# Patient Record
Sex: FEMALE | Race: WHITE | Employment: OTHER | URBAN - METROPOLITAN AREA
[De-identification: names, ages, dates, MRNs, and addresses within clinical notes are randomized per-mention and may not be internally consistent; named-entity substitution may affect disease eponyms.]

---

## 2017-04-21 ENCOUNTER — HOSPITAL ENCOUNTER (OUTPATIENT)
Dept: MAMMOGRAPHY | Age: 77
Discharge: HOME | End: 2017-04-21
Attending: FAMILY MEDICINE
Payer: MEDICARE

## 2017-04-21 DIAGNOSIS — Z78.0 POSTMENOPAUSAL: ICD-10-CM

## 2017-04-21 PROCEDURE — 77080 DXA BONE DENSITY AXIAL: CPT

## 2017-10-16 ENCOUNTER — HOSPITAL ENCOUNTER (OUTPATIENT)
Dept: LAB | Age: 77
Discharge: HOME | End: 2017-10-16

## 2017-10-16 PROCEDURE — 88305 TISSUE EXAM BY PATHOLOGIST: CPT | Performed by: INTERNAL MEDICINE

## 2017-10-25 ENCOUNTER — HOSPITAL ENCOUNTER (OUTPATIENT)
Dept: ULTRASOUND IMAGING | Age: 77
Discharge: HOME | End: 2017-10-25
Payer: MEDICARE

## 2017-10-25 DIAGNOSIS — M79.661 RIGHT CALF PAIN: ICD-10-CM

## 2017-10-25 PROBLEM — M25.561 ACUTE PAIN OF RIGHT KNEE: Status: ACTIVE | Noted: 2017-10-25

## 2017-10-25 PROCEDURE — 93971 EXTREMITY STUDY: CPT

## 2017-11-06 ENCOUNTER — HOSPITAL ENCOUNTER (OUTPATIENT)
Dept: LAB | Age: 77
Discharge: HOME | End: 2017-11-06
Payer: MEDICARE

## 2017-11-06 DIAGNOSIS — I25.119 CORONARY ARTERY DISEASE INVOLVING NATIVE CORONARY ARTERY OF NATIVE HEART WITH ANGINA PECTORIS (HCC): Chronic | ICD-10-CM

## 2017-11-06 LAB
ANION GAP SERPL CALC-SCNC: 10 MMOL/L
BASOPHILS # BLD: 0.1 K/UL (ref 0–0.2)
BASOPHILS NFR BLD: 1 % (ref 0–2)
BNP SERPL-MCNC: 105 PG/ML
BUN SERPL-MCNC: 19 MG/DL (ref 8–23)
CALCIUM SERPL-MCNC: 9 MG/DL (ref 8.3–10.4)
CHLORIDE SERPL-SCNC: 103 MMOL/L (ref 98–107)
CO2 SERPL-SCNC: 28 MMOL/L (ref 21–32)
CREAT SERPL-MCNC: 0.8 MG/DL (ref 0.6–1)
DIFFERENTIAL METHOD BLD: ABNORMAL
EOSINOPHIL # BLD: 0.3 K/UL (ref 0–0.8)
EOSINOPHIL NFR BLD: 6 % (ref 0.5–7.8)
ERYTHROCYTE [DISTWIDTH] IN BLOOD BY AUTOMATED COUNT: 12.8 % (ref 11.9–14.6)
GLUCOSE SERPL-MCNC: 99 MG/DL (ref 65–100)
HCT VFR BLD AUTO: 39.3 % (ref 35.8–46.3)
HGB BLD-MCNC: 12.6 G/DL (ref 11.7–15.4)
INR PPP: 0.9 (ref 0.9–1.2)
LYMPHOCYTES # BLD: 2.3 K/UL (ref 0.5–4.6)
LYMPHOCYTES NFR BLD: 38 % (ref 13–44)
MCH RBC QN AUTO: 31.9 PG (ref 26.1–32.9)
MCHC RBC AUTO-ENTMCNC: 32.1 G/DL (ref 31.4–35)
MCV RBC AUTO: 99.5 FL (ref 79.6–97.8)
MONOCYTES # BLD: 0.4 K/UL (ref 0.1–1.3)
MONOCYTES NFR BLD: 7 % (ref 4–12)
NEUTS SEG # BLD: 2.9 K/UL (ref 1.7–8.2)
NEUTS SEG NFR BLD: 48 % (ref 43–78)
PLATELET # BLD AUTO: 191 K/UL (ref 150–450)
PMV BLD AUTO: 12 FL (ref 10.8–14.1)
POTASSIUM SERPL-SCNC: 4.2 MMOL/L (ref 3.5–5.1)
PROTHROMBIN TIME: 9.5 SEC (ref 9.6–12)
RBC # BLD AUTO: 3.95 M/UL (ref 4.05–5.25)
SODIUM SERPL-SCNC: 141 MMOL/L (ref 136–145)
TSH SERPL DL<=0.005 MIU/L-ACNC: 1.64 UIU/ML (ref 0.36–3.74)
WBC # BLD AUTO: 5.9 K/UL (ref 4.3–11.1)

## 2017-11-06 PROCEDURE — 80048 BASIC METABOLIC PNL TOTAL CA: CPT | Performed by: INTERNAL MEDICINE

## 2017-11-06 PROCEDURE — 84443 ASSAY THYROID STIM HORMONE: CPT | Performed by: INTERNAL MEDICINE

## 2017-11-06 PROCEDURE — 85025 COMPLETE CBC W/AUTO DIFF WBC: CPT | Performed by: INTERNAL MEDICINE

## 2017-11-06 PROCEDURE — 85610 PROTHROMBIN TIME: CPT | Performed by: INTERNAL MEDICINE

## 2017-11-06 PROCEDURE — 83880 ASSAY OF NATRIURETIC PEPTIDE: CPT | Performed by: INTERNAL MEDICINE

## 2017-11-06 PROCEDURE — 36415 COLL VENOUS BLD VENIPUNCTURE: CPT | Performed by: INTERNAL MEDICINE

## 2017-11-09 NOTE — PROGRESS NOTES
Patient pre-assessment complete for [facility] poss with Dr Ezekiel Lugo scheduled for 11/10/17 at 8am, arrival time 6am. Patient verified using . Patient instructed to bring all home medications in labeled bottles on the day of procedure. NPO status reinforced. Patient informed to take a full dose aspirin 325mg  or 81 mg x 4 on the day of procedure. Patient instructed to HOLD metformin (last dose 17). Instructed they can take all other medications excluding vitamins & supplements. Patient verbalizes understanding of all instructions & denies any questions at this time.

## 2017-11-10 ENCOUNTER — HOSPITAL ENCOUNTER (OUTPATIENT)
Dept: CARDIAC CATH/INVASIVE PROCEDURES | Age: 77
Discharge: HOME | End: 2017-11-10
Attending: INTERNAL MEDICINE | Admitting: INTERNAL MEDICINE
Payer: MEDICARE

## 2017-11-10 VITALS
WEIGHT: 164 LBS | TEMPERATURE: 98 F | RESPIRATION RATE: 14 BRPM | SYSTOLIC BLOOD PRESSURE: 165 MMHG | DIASTOLIC BLOOD PRESSURE: 72 MMHG | OXYGEN SATURATION: 96 % | BODY MASS INDEX: 24.86 KG/M2 | HEART RATE: 62 BPM | HEIGHT: 68 IN

## 2017-11-10 LAB
ANION GAP SERPL CALC-SCNC: 7 MMOL/L (ref 7–16)
ATRIAL RATE: 57 BPM
BUN SERPL-MCNC: 15 MG/DL (ref 8–23)
CALCIUM SERPL-MCNC: 8.9 MG/DL (ref 8.3–10.4)
CALCULATED P AXIS, ECG09: 46 DEGREES
CALCULATED R AXIS, ECG10: 64 DEGREES
CALCULATED T AXIS, ECG11: 67 DEGREES
CHLORIDE SERPL-SCNC: 103 MMOL/L (ref 98–107)
CO2 SERPL-SCNC: 29 MMOL/L (ref 21–32)
CREAT SERPL-MCNC: 0.75 MG/DL (ref 0.6–1)
DIAGNOSIS, 93000: NORMAL
ERYTHROCYTE [DISTWIDTH] IN BLOOD BY AUTOMATED COUNT: 13 % (ref 11.9–14.6)
GLUCOSE SERPL-MCNC: 103 MG/DL (ref 65–100)
HCT VFR BLD AUTO: 36.6 % (ref 35.8–46.3)
HGB BLD-MCNC: 11.7 G/DL (ref 11.7–15.4)
INR PPP: 0.9 (ref 0.9–1.2)
MAGNESIUM SERPL-MCNC: 2 MG/DL (ref 1.8–2.4)
MCH RBC QN AUTO: 30.4 PG (ref 26.1–32.9)
MCHC RBC AUTO-ENTMCNC: 32 G/DL (ref 31.4–35)
MCV RBC AUTO: 95.1 FL (ref 79.6–97.8)
P-R INTERVAL, ECG05: 172 MS
PLATELET # BLD AUTO: 184 K/UL (ref 150–450)
PMV BLD AUTO: 11.8 FL (ref 10.8–14.1)
POTASSIUM SERPL-SCNC: 4.5 MMOL/L (ref 3.5–5.1)
PROTHROMBIN TIME: 10 SEC (ref 9.6–12)
Q-T INTERVAL, ECG07: 462 MS
QRS DURATION, ECG06: 90 MS
QTC CALCULATION (BEZET), ECG08: 449 MS
RBC # BLD AUTO: 3.85 M/UL (ref 4.05–5.25)
SODIUM SERPL-SCNC: 139 MMOL/L (ref 136–145)
VENTRICULAR RATE, ECG03: 57 BPM
WBC # BLD AUTO: 5.1 K/UL (ref 4.3–11.1)

## 2017-11-10 PROCEDURE — 93458 L HRT ARTERY/VENTRICLE ANGIO: CPT

## 2017-11-10 PROCEDURE — 85610 PROTHROMBIN TIME: CPT | Performed by: INTERNAL MEDICINE

## 2017-11-10 PROCEDURE — 77030004534 HC CATH ANGI DX INFN CARD -A

## 2017-11-10 PROCEDURE — 77030029997 HC DEV COM RDL R BND TELE -B

## 2017-11-10 PROCEDURE — 74011636320 HC RX REV CODE- 636/320: Performed by: INTERNAL MEDICINE

## 2017-11-10 PROCEDURE — 74011250636 HC RX REV CODE- 250/636

## 2017-11-10 PROCEDURE — C1894 INTRO/SHEATH, NON-LASER: HCPCS

## 2017-11-10 PROCEDURE — C1769 GUIDE WIRE: HCPCS

## 2017-11-10 PROCEDURE — 85027 COMPLETE CBC AUTOMATED: CPT | Performed by: INTERNAL MEDICINE

## 2017-11-10 PROCEDURE — 74011250637 HC RX REV CODE- 250/637: Performed by: INTERNAL MEDICINE

## 2017-11-10 PROCEDURE — 83735 ASSAY OF MAGNESIUM: CPT | Performed by: INTERNAL MEDICINE

## 2017-11-10 PROCEDURE — 77030015766

## 2017-11-10 PROCEDURE — 74011250636 HC RX REV CODE- 250/636: Performed by: INTERNAL MEDICINE

## 2017-11-10 PROCEDURE — 93005 ELECTROCARDIOGRAM TRACING: CPT | Performed by: INTERNAL MEDICINE

## 2017-11-10 PROCEDURE — 99152 MOD SED SAME PHYS/QHP 5/>YRS: CPT

## 2017-11-10 PROCEDURE — 74011000250 HC RX REV CODE- 250: Performed by: INTERNAL MEDICINE

## 2017-11-10 PROCEDURE — 80048 BASIC METABOLIC PNL TOTAL CA: CPT | Performed by: INTERNAL MEDICINE

## 2017-11-10 RX ORDER — SODIUM CHLORIDE 9 MG/ML
75 INJECTION, SOLUTION INTRAVENOUS CONTINUOUS
Status: DISCONTINUED | OUTPATIENT
Start: 2017-11-10 | End: 2017-11-10 | Stop reason: HOSPADM

## 2017-11-10 RX ORDER — LIDOCAINE HYDROCHLORIDE 20 MG/ML
1-20 INJECTION, SOLUTION INFILTRATION; PERINEURAL ONCE
Status: COMPLETED | OUTPATIENT
Start: 2017-11-10 | End: 2017-11-10

## 2017-11-10 RX ORDER — FENTANYL CITRATE 50 UG/ML
25-100 INJECTION, SOLUTION INTRAMUSCULAR; INTRAVENOUS
Status: DISCONTINUED | OUTPATIENT
Start: 2017-11-10 | End: 2017-11-10

## 2017-11-10 RX ORDER — GUAIFENESIN 100 MG/5ML
81-324 LIQUID (ML) ORAL
Status: DISCONTINUED | OUTPATIENT
Start: 2017-11-10 | End: 2017-11-10 | Stop reason: HOSPADM

## 2017-11-10 RX ORDER — DIAZEPAM 5 MG/1
5 TABLET ORAL ONCE
Status: COMPLETED | OUTPATIENT
Start: 2017-11-10 | End: 2017-11-10

## 2017-11-10 RX ORDER — MIDAZOLAM HYDROCHLORIDE 1 MG/ML
1-6 INJECTION, SOLUTION INTRAMUSCULAR; INTRAVENOUS
Status: DISCONTINUED | OUTPATIENT
Start: 2017-11-10 | End: 2017-11-10

## 2017-11-10 RX ORDER — HEPARIN SODIUM 200 [USP'U]/100ML
15 INJECTION, SOLUTION INTRAVENOUS CONTINUOUS
Status: DISCONTINUED | OUTPATIENT
Start: 2017-11-10 | End: 2017-11-10

## 2017-11-10 RX ORDER — SODIUM CHLORIDE 0.9 % (FLUSH) 0.9 %
5-10 SYRINGE (ML) INJECTION EVERY 8 HOURS
Status: DISCONTINUED | OUTPATIENT
Start: 2017-11-10 | End: 2017-11-10 | Stop reason: HOSPADM

## 2017-11-10 RX ORDER — SODIUM CHLORIDE 0.9 % (FLUSH) 0.9 %
5-10 SYRINGE (ML) INJECTION AS NEEDED
Status: DISCONTINUED | OUTPATIENT
Start: 2017-11-10 | End: 2017-11-10 | Stop reason: HOSPADM

## 2017-11-10 RX ADMIN — HEPARIN SODIUM 15 UNITS/HR: 200 INJECTION, SOLUTION INTRAVENOUS at 08:15

## 2017-11-10 RX ADMIN — HEPARIN SODIUM 2 ML: 10000 INJECTION, SOLUTION INTRAVENOUS; SUBCUTANEOUS at 08:37

## 2017-11-10 RX ADMIN — FENTANYL CITRATE 25 MCG: 50 INJECTION, SOLUTION INTRAMUSCULAR; INTRAVENOUS at 08:28

## 2017-11-10 RX ADMIN — SODIUM CHLORIDE 75 ML/HR: 900 INJECTION, SOLUTION INTRAVENOUS at 07:11

## 2017-11-10 RX ADMIN — LIDOCAINE HYDROCHLORIDE 60 MG: 20 INJECTION, SOLUTION INFILTRATION; PERINEURAL at 08:36

## 2017-11-10 RX ADMIN — IOPAMIDOL 70 ML: 755 INJECTION, SOLUTION INTRAVENOUS at 08:47

## 2017-11-10 RX ADMIN — MIDAZOLAM HYDROCHLORIDE 1 MG: 1 INJECTION, SOLUTION INTRAMUSCULAR; INTRAVENOUS at 08:28

## 2017-11-10 RX ADMIN — FENTANYL CITRATE 25 MCG: 50 INJECTION, SOLUTION INTRAMUSCULAR; INTRAVENOUS at 08:37

## 2017-11-10 RX ADMIN — DIAZEPAM 5 MG: 5 TABLET ORAL at 07:14

## 2017-11-10 RX ADMIN — MIDAZOLAM HYDROCHLORIDE 1 MG: 1 INJECTION, SOLUTION INTRAMUSCULAR; INTRAVENOUS at 08:36

## 2017-11-10 NOTE — IP AVS SNAPSHOT
Darleen Akhtar 
 
 
 [address] [address] [facility] 
[phone] Patient: Aristeo Willis MRN: [id number] [facility]:[birth year]009 Discharge Summary 11/10/2017 Aristeo Willis MRN[de-identified]  [id number] Admission Information Provider Pager Service Admission Date Expected D/C Date Anali Webb MD  CARDIAC CATH LAB 11/10/2017 Actual LOS Patient Class 0 days OUTPATIENT Follow-up Information Follow up With Details Comments Contact Info Dayton Clemente DO On 12/7/2017 A follow-up appointment has been scheduled for you for Thursday, December 7 at 9:15am with Dr. Misty Roberto in the [place] office. [address] [facility] Card[facility] [address] 
[phone] My Medications ASK your physician about these medications Instructions Each Dose to Equal  
 Morning Noon Evening Bedtime  
 albuterol 90 mcg/actuation inhaler Commonly known as:  PROAIR HFA Your last dose was: Your next dose is: Take 2 Puffs by inhalation every four (4) hours as needed for Wheezing. 2 Puff  
    
   
   
   
  
 aspirin delayed-release 81 mg tablet Your last dose was: Your next dose is: Take  by mouth daily. FLUoxetine 40 mg capsule Commonly known as:  PROzac Your last dose was: Your next dose is: Take 1 Cap by mouth two (2) times a day. 40 mg  
    
   
   
   
  
 fluticasone 50 mcg/actuation nasal spray Commonly known as:  Charles Merles Your last dose was: Your next dose is: 2 Sprays by Both Nostrils route daily. 2 [place] LUBRICANT EYE DROPS (GLYC-PG) OP Your last dose was: Your next dose is:    
   
   
 as needed. metFORMIN 1,000 mg tablet Commonly known as:  GLUCOPHAGE Your last dose was: Your next dose is: TAKE 1 TABLET TWICE DAILY WITH MEALS  
     
   
   
   
  
 nitroglycerin 0.4 mg SL tablet Commonly known as:  NITROSTAT Your last dose was: Your next dose is:    
   
   
 TAke one pill every 5 minutes at onset of chest pain, repeat 3 times, call 911 if it doesn't resolve  
     
   
   
   
  
 potassium 99 mg tablet Your last dose was: Your next dose is: Take 99 mg by mouth daily. 99 mg  
    
   
   
   
  
  
  
  
 
  
General Information Please provide this summary of care documentation to your next provider. Allergies Unspecified:  Codeine;  Crestor [Rosuvastatin]; Esomeprazole;  Lipitor [Atorvastatin]; Lisinopril;  Pravachol [Pravastatin];  Sulfa (Sulfonamide Antibiotics); Sulfur Current Immunizations  Reviewed on 4/14/2017 Name Date Influenza High Dose Vaccine PF 9/2/2017, 10/27/2016, 10/26/2015, 10/9/2014, 9/30/2013, 10/16/2012 Influenza Vaccine 9/13/2011, 10/29/2010 Pneumococcal Conjugate (PCV-13) 4/14/2017 Pneumococcal Vaccine (Unspecified Type) 3/26/2014, 1/1/2013, 1/1/2012 Zoster Vaccine, Live 3/31/2017 Discharge Instructions Discharge Instructions HEART CATHETERIZATION/ANGIOGRAPHY DISCHARGE INSTRUCTIONS 1. Check puncture site frequently for swelling or bleeding. If there is any bleeding, lie down and apply pressure over the area with a clean towel or washcloth. Call 911. Notify your doctor for any redness, swelling, drainage, or oozing from the puncture site. Notify your doctor for any fever or chills. 2. If the extremity becomes cold, numb, or painful call [facility] at [phone]. 
3. Activity should be limited for the next 48 hours. Climb stairs as little as possible and avoid any stooping, bending, or strenuous activity for 48 hours. No heavy lifting (anything over 10 pounds) for 3 days. 4. You may resume your usual diet. Drink more fluids than usual. 
5. Have a responsible person drive you home and stay with you for at least 24 hours after your heart catheterization/angiography. Do not drive for the next 24 hours. 6. You may remove bandage from your Right wrist in 24 hours. You may shower in 24 hours. No tub baths, hot tubs, or swimming for 1 week. Do not place any lotions, creams, powders, or ointments over puncture site for 1 week. You may place a clean band-aid over the puncture site each day for 5 days. Change daily. 7. Please continue your medications as prescribed by your physician. I have read the above instructions and have had the opportunity to ask questions. Patient: ________________________   Date: 11/10/2017 Witness: _______________________   Date: 11/10/2017 Discharge Orders None  
  
` Patient Signature:  ____________________________________________________________ Date:  ____________________________________________________________  
  
 Nydia Butcher Provider Signature:  ____________________________________________________________ Date:  ____________________________________________________________

## 2017-11-10 NOTE — PROCEDURES
[address]       Name:  Michele Champagne   MR#:  [id number]   :  1940   Account #:  [de-identified]   Date of Adm:  11/10/2017       DATE OF PROCEDURE: 11/10/2017     CLINICAL INFORMATION: Patient with worsening exertional fatigue   and shortness of breath concerning for [place] class 3 angina,   referred for catheterization. PROCEDURE DETAILS: After informed consent was obtained, a 6-  [place] sheath was placed in the right radial artery. A radial   cocktail was given by protocol. A 5-[place] Tiger catheter and   angled pigtail were used for diagnostic angiography. TR band was   placed at the end of the procedure. Conscious sedation was given by Ashkan Us RN beginning at 8:26   and concluding at 8:50 for a total of 2 mg of Versed and 50 mcg   fentanyl. Vital signs and saturation were stable throughout. FINDINGS: The left main coronary artery is in the normal   anatomic position, free of significant disease. It bifurcates   into LAD and circumflex system. The LAD has a smooth a 30% to 40% mid vessel narrowing. The   distal vessel is small, but free of any high-grade stenosis. There is a stent in the proximal diagonal branch, this is widely   patent. The circumflex has a proximal moderate-sized obtuse marginal   branch. There is no atherosclerosis seen in the circumflex   system. The right coronary artery is a large dominant vessel in normal   anatomic position with a smooth 30% mid vessel narrowing. The   PDA, posterolateral obtuse marginal branches are free of   significant disease. There is 20% proximal RCA narrowing. Left ventriculogram reveals normal systolic function, ejection   fraction 60%. Left ventricular end-diastolic pressure is 16   mmHg. Opening aortic pressure is 110/55 with no gradient across   the aortic valve. CONCLUSION:   1. Mild diffuse coronary atherosclerotic heart disease with no   high-grade lesions seen. 2. Normal left ventricular systolic function.         MD NEELA Aden / Ashley Moon   D:  11/10/2017   09:11   T:  11/10/2017   12:54   Job #:  [id number]

## 2017-11-10 NOTE — ROUTINE PROCESS
TRANSFER - OUT REPORT:    Verbal report given to Stephanie Arevalo RN (name) on Naveen Keita  being transferred to [address] [place] [address] (unit) for routine progression of care       Report consisted of patients Situation, Background, Assessment and   Recommendations(SBAR). Information from the following report(s) Procedure Summary, MAR and Recent Results was reviewed with the receiving nurse. Lines:   Peripheral IV 11/10/17 Right Antecubital (Active)        Opportunity for questions and clarification was provided.       Patient transported with:   [facility] w/ Dr Toney Skinner cath  R band to right radial w/ 8 mls at 0850  Versed 2 mg IV  Fentanyl 50 mcg IV

## 2017-11-10 NOTE — PROGRESS NOTES
IP consult to Cardiac Rehab. Patient had LHC with no PCI. Cath was clean. We will contact patient if qualifying referral is received.

## 2017-11-10 NOTE — PROCEDURES
Brief Cardiac Procedure Note    Patient: Shashi Berry MRN: [id number]  SSN: [id number]    YOB: 1940  Age: 78 y.o. Sex: female      Date of Procedure: 11/10/2017     Pre-procedure Diagnosis: Chest pain CCS Class III    Post-procedure Diagnosis: Non-cardiac Chest Pain    Procedure: Left Heart Catheterization    Brief Description of Procedure: lhc via right radial, tr    Performed By: John Simpson MD     Assistants: none    Anesthesia: Moderate Sedation    Estimated Blood Loss: Less than 10 mL      Specimens: None    Implants: None    Findings: mild cad- nl lvef    Complications: None    Recommendations: Continue medical therapy.     Signed By: John Simpson MD     November 10, 2017

## 2017-11-10 NOTE — DISCHARGE INSTRUCTIONS
HEART CATHETERIZATION/ANGIOGRAPHY DISCHARGE INSTRUCTIONS    1. Check puncture site frequently for swelling or bleeding. If there is any bleeding, lie down and apply pressure over the area with a clean towel or washcloth. Call 911. Notify your doctor for any redness, swelling, drainage, or oozing from the puncture site. Notify your doctor for any fever or chills. 2. If the extremity becomes cold, numb, or painful call [facility] at [phone].  3. Activity should be limited for the next 48 hours. Climb stairs as little as possible and avoid any stooping, bending, or strenuous activity for 48 hours. No heavy lifting (anything over 10 pounds) for 3 days. 4. You may resume your usual diet. Drink more fluids than usual.  5. Have a responsible person drive you home and stay with you for at least 24 hours after your heart catheterization/angiography. Do not drive for the next 24 hours. 6. You may remove bandage from your Right wrist in 24 hours. You may shower in 24 hours. No tub baths, hot tubs, or swimming for 1 week. Do not place any lotions, creams, powders, or ointments over puncture site for 1 week. You may place a clean band-aid over the puncture site each day for 5 days. Change daily. 7. Please continue your medications as prescribed by your physician. I have read the above instructions and have had the opportunity to ask questions.       Patient: ________________________   Date: 11/10/2017    Witness: _______________________   Date: 11/10/2017

## 2017-11-10 NOTE — PROGRESS NOTES
Patient received to [address] [place] [address] room # 7  Ambulatory from [facility]. Patient scheduled for LHC today with Dr Che Rivera. Procedure reviewed & questions answered, voiced good understanding consent obtained & placed on chart. All medications and medical history reviewed. Will prep patient per orders. Patient & family updated on plan of care.

## 2017-11-10 NOTE — PROGRESS NOTES
TRANSFER - IN REPORT:    Verbal report received from Beny Bush RN(name) on [facility]  being received from cath lab(unit) for routine progression of care      Report consisted of patients Situation, Background, Assessment and   Recommendations(SBAR). Information from the following report(s) Procedure Summary was reviewed with the receiving nurse. Opportunity for questions and clarification was provided. Assessment completed upon patients arrival to unit and care assumed.

## 2017-11-29 ENCOUNTER — HOSPITAL ENCOUNTER (OUTPATIENT)
Dept: NUCLEAR MEDICINE | Age: 77
Discharge: HOME | End: 2017-11-29
Attending: FAMILY MEDICINE
Payer: MEDICARE

## 2017-11-29 DIAGNOSIS — G89.29 CHRONIC THORACIC SPINE PAIN: ICD-10-CM

## 2017-11-29 DIAGNOSIS — M54.6 CHRONIC THORACIC SPINE PAIN: ICD-10-CM

## 2017-11-29 DIAGNOSIS — Z85.3 HISTORY OF BREAST CANCER: ICD-10-CM

## 2017-11-29 PROCEDURE — 78306 BONE IMAGING WHOLE BODY: CPT

## 2018-04-10 PROBLEM — M79.18 MUSCLE PAIN, MYOFACIAL: Status: ACTIVE | Noted: 2018-04-10

## 2018-06-28 ENCOUNTER — PATIENT OUTREACH (OUTPATIENT)
Dept: CASE MANAGEMENT | Age: 78
End: 2018-06-28

## 2018-06-28 NOTE — PROGRESS NOTES
Medication Adherence Outreach    Date/Time of Call:  6/28/2018  3:30 pm    Name of medication and dosage:   * Metformin 1000 mg 1 bid    Does the patient know the purpose and dosage of medication? * Yes    Are you getting a #30 day or #90 day supply of your medication? * 90 day supply    Are you still taking this medication? If not, why? * Yes    Transportation issues or any problems paying for the medication or other reason? * No    What pharmacy are you using to fill your medication and do you know the last time that you got your medication filled? * [facility]   * Last refill 4/19/2018   Are you having any side effects from taking your medication? * No          Any other questions or concerns expressed by the patient. * No    Comments:     * Discussed medication adherence with Ms. Nila Conroy and she states she has no current barriers to prevent her from obtaining or taking her medication.        Call Completed By:     [id number] WHIT Chavez.

## 2018-10-17 PROBLEM — E11.21 TYPE 2 DIABETES WITH NEPHROPATHY (HCC): Status: ACTIVE | Noted: 2018-10-17

## 2018-12-26 ENCOUNTER — HOSPITAL ENCOUNTER (OUTPATIENT)
Dept: MRI IMAGING | Age: 78
Discharge: HOME | End: 2018-12-26
Attending: FAMILY MEDICINE
Payer: MEDICARE

## 2018-12-26 DIAGNOSIS — R29.898 WEAKNESS OF BOTH LOWER EXTREMITIES: ICD-10-CM

## 2018-12-26 DIAGNOSIS — G89.29 CHRONIC RIGHT-SIDED THORACIC BACK PAIN: ICD-10-CM

## 2018-12-26 DIAGNOSIS — M54.6 CHRONIC RIGHT-SIDED THORACIC BACK PAIN: ICD-10-CM

## 2018-12-26 PROCEDURE — 72146 MRI CHEST SPINE W/O DYE: CPT

## 2018-12-28 NOTE — PROGRESS NOTES
Please let Mrs. Bella know that her thoracic spine MRI shows disc protrusion without cord compression. This is the reason for her pain, but it does not surgical intervention. She may be interested in going to pain management to see what they can offer her for this pain.

## 2019-01-19 ENCOUNTER — APPOINTMENT (OUTPATIENT)
Dept: CT IMAGING | Age: 79
End: 2019-01-19
Attending: EMERGENCY MEDICINE
Payer: MEDICARE

## 2019-01-19 ENCOUNTER — HOSPITAL ENCOUNTER (EMERGENCY)
Age: 79
Discharge: HOME | End: 2019-01-19
Attending: EMERGENCY MEDICINE
Payer: MEDICARE

## 2019-01-19 VITALS
WEIGHT: 172 LBS | RESPIRATION RATE: 18 BRPM | HEIGHT: 68 IN | TEMPERATURE: 97.7 F | DIASTOLIC BLOOD PRESSURE: 69 MMHG | OXYGEN SATURATION: 96 % | BODY MASS INDEX: 26.07 KG/M2 | HEART RATE: 75 BPM | SYSTOLIC BLOOD PRESSURE: 133 MMHG

## 2019-01-19 DIAGNOSIS — R10.9 ACUTE RIGHT FLANK PAIN: Primary | ICD-10-CM

## 2019-01-19 LAB
ALBUMIN SERPL-MCNC: 4 G/DL (ref 3.2–4.6)
ALBUMIN/GLOB SERPL: 1.1 {RATIO} (ref 1.2–3.5)
ALP SERPL-CCNC: 111 U/L (ref 50–136)
ALT SERPL-CCNC: 25 U/L (ref 12–65)
ANION GAP SERPL CALC-SCNC: 9 MMOL/L (ref 7–16)
AST SERPL-CCNC: 23 U/L (ref 15–37)
BACTERIA URNS QL MICRO: ABNORMAL /HPF
BILIRUB SERPL-MCNC: 0.3 MG/DL (ref 0.2–1.1)
BUN SERPL-MCNC: 15 MG/DL (ref 8–23)
CALCIUM SERPL-MCNC: 8.8 MG/DL (ref 8.3–10.4)
CASTS URNS QL MICRO: 0 /LPF
CHLORIDE SERPL-SCNC: 105 MMOL/L (ref 98–107)
CO2 SERPL-SCNC: 24 MMOL/L (ref 21–32)
CREAT SERPL-MCNC: 1.19 MG/DL (ref 0.6–1)
EPI CELLS #/AREA URNS HPF: 0 /HPF
ERYTHROCYTE [DISTWIDTH] IN BLOOD BY AUTOMATED COUNT: 12.6 % (ref 11.9–14.6)
GLOBULIN SER CALC-MCNC: 3.8 G/DL (ref 2.3–3.5)
GLUCOSE SERPL-MCNC: 125 MG/DL (ref 65–100)
HCT VFR BLD AUTO: 36.5 % (ref 35.8–46.3)
HGB BLD-MCNC: 11.7 G/DL (ref 11.7–15.4)
MCH RBC QN AUTO: 31.7 PG (ref 26.1–32.9)
MCHC RBC AUTO-ENTMCNC: 32.1 G/DL (ref 31.4–35)
MCV RBC AUTO: 98.9 FL (ref 79.6–97.8)
NRBC # BLD: 0 K/UL (ref 0–0.2)
PLATELET # BLD AUTO: 198 K/UL (ref 150–450)
PMV BLD AUTO: 11.3 FL (ref 9.4–12.3)
POTASSIUM SERPL-SCNC: 4 MMOL/L (ref 3.5–5.1)
PROT SERPL-MCNC: 7.8 G/DL (ref 6.3–8.2)
RBC # BLD AUTO: 3.69 M/UL (ref 4.05–5.2)
RBC #/AREA URNS HPF: ABNORMAL /HPF
SODIUM SERPL-SCNC: 138 MMOL/L (ref 136–145)
WBC # BLD AUTO: 6.1 K/UL (ref 4.3–11.1)
WBC URNS QL MICRO: ABNORMAL /HPF

## 2019-01-19 PROCEDURE — 96375 TX/PRO/DX INJ NEW DRUG ADDON: CPT | Performed by: EMERGENCY MEDICINE

## 2019-01-19 PROCEDURE — 80053 COMPREHEN METABOLIC PANEL: CPT

## 2019-01-19 PROCEDURE — 74176 CT ABD & PELVIS W/O CONTRAST: CPT

## 2019-01-19 PROCEDURE — 87186 SC STD MICRODIL/AGAR DIL: CPT

## 2019-01-19 PROCEDURE — 99284 EMERGENCY DEPT VISIT MOD MDM: CPT | Performed by: EMERGENCY MEDICINE

## 2019-01-19 PROCEDURE — 74011250636 HC RX REV CODE- 250/636: Performed by: EMERGENCY MEDICINE

## 2019-01-19 PROCEDURE — 87088 URINE BACTERIA CULTURE: CPT

## 2019-01-19 PROCEDURE — 85027 COMPLETE CBC AUTOMATED: CPT

## 2019-01-19 PROCEDURE — 87086 URINE CULTURE/COLONY COUNT: CPT

## 2019-01-19 PROCEDURE — 96374 THER/PROPH/DIAG INJ IV PUSH: CPT | Performed by: EMERGENCY MEDICINE

## 2019-01-19 PROCEDURE — 81003 URINALYSIS AUTO W/O SCOPE: CPT | Performed by: EMERGENCY MEDICINE

## 2019-01-19 PROCEDURE — 81015 MICROSCOPIC EXAM OF URINE: CPT

## 2019-01-19 PROCEDURE — 74011250637 HC RX REV CODE- 250/637: Performed by: EMERGENCY MEDICINE

## 2019-01-19 RX ORDER — MORPHINE SULFATE 2 MG/ML
4 INJECTION, SOLUTION INTRAMUSCULAR; INTRAVENOUS
Status: COMPLETED | OUTPATIENT
Start: 2019-01-19 | End: 2019-01-19

## 2019-01-19 RX ORDER — CEPHALEXIN 500 MG/1
500 CAPSULE ORAL
Status: COMPLETED | OUTPATIENT
Start: 2019-01-19 | End: 2019-01-19

## 2019-01-19 RX ORDER — CEPHALEXIN 500 MG/1
500 CAPSULE ORAL 4 TIMES DAILY
Qty: 28 CAP | Refills: 0 | Status: SHIPPED | OUTPATIENT
Start: 2019-01-19 | End: 2019-01-26

## 2019-01-19 RX ORDER — TRAMADOL HYDROCHLORIDE 50 MG/1
50 TABLET ORAL
Qty: 12 TAB | Refills: 0 | Status: SHIPPED | OUTPATIENT
Start: 2019-01-19 | End: 2019-04-01

## 2019-01-19 RX ORDER — ONDANSETRON 2 MG/ML
4 INJECTION INTRAMUSCULAR; INTRAVENOUS
Status: COMPLETED | OUTPATIENT
Start: 2019-01-19 | End: 2019-01-19

## 2019-01-19 RX ADMIN — ONDANSETRON 4 MG: 2 INJECTION INTRAMUSCULAR; INTRAVENOUS at 12:55

## 2019-01-19 RX ADMIN — CEPHALEXIN 500 MG: 500 CAPSULE ORAL at 14:57

## 2019-01-19 RX ADMIN — MORPHINE SULFATE 4 MG: 2 INJECTION, SOLUTION INTRAMUSCULAR; INTRAVENOUS at 12:55

## 2019-01-19 NOTE — ED NOTES
I have reviewed discharge instructions with the patient. The patient verbalized understanding. Patient left ED via Discharge Method: ambulatory to Home with (Daughter). Opportunity for questions and clarification provided. Patient given 2 scripts. To continue your aftercare when you leave the hospital, you may receive an automated call from our care team to check in on how you are doing. This is a free service and part of our promise to provide the best care and service to meet your aftercare needs.  If you have questions, or wish to unsubscribe from this service please call [phone]. Thank you for Choosing our [facility] Emergency Department.

## 2019-01-19 NOTE — ED PROVIDER NOTES
HPI: 
70 female here with right flank pain leading to right lower abdomen for the past several days. Unable to urinate at this time. Has had prior cholecystectomy, appendectomy, pronator disc surgery. No weakness tingling or numbness. No saddle paresthesia. Pain is constant. No prior kidney stones ROS Constitutional: No fever, no chills Skin: no rash Eye:  
ENMT:  
Respiratory: No shortness of breath, no cough Cardiovascular: No chest pain, no palpitations Gastrointestinal: No vomiting, no nausea, no diarrhea, + abdominal pain : No dysuria MSK: No back pain, no muscle pain, no joint pain Neuro: No headache, no change in mental status, no numbness, no tingling, no weakness Psych:  
Endocrine:  
All other review of systems positive per history of present illness and the above otherwise negative or noncontributory. Visit Vitals /62 (BP 1 Location: Right arm, BP Patient Position: At rest;Sitting) Pulse 84 Temp 97.7 °F (36.5 °C) Resp 18 Ht 5' 8\" (1.727 m) Wt 78 kg (172 lb) SpO2 95% BMI 26.15 kg/m² Past Medical History:  
Diagnosis Date  Abnormal EKG  ACE inhibitor intolerance 2014  
 cough with lisinopril  Allergic rhinitis 11/10/2014  Allergic rhinitis, cause unspecified  Arthritis  Asthma 11/10/2014  Backache, unspecified  CAD (coronary artery disease) 2/4/2016  [facility] breast  
 Continuous leakage(788.37)  Depressive disorder, not elsewhere classified  Diverticulitis large intestine  Diverticulitis large intestine w/o perforation or abscess w/o bleeding 10/26/2015  Diverticulitis of colon (without mention of hemorrhage)(562.11)  Dyslipidemia  Dyspnea Shortness of breath  Encounter for long-term (current) use of aspirin  Esophageal dysphagia 10/26/2015  Esophageal reflux  Essential hypertension  External hemorrhoids without mention of complication  Gastritis due to nonsteroidal anti-inflammatory drug (NSAID) 6/26/2015  Generalized anxiety disorder  GERD (gastroesophageal reflux disease)  Heart disease, unspecified  HLD (hyperlipidemia)  HTN (hypertension), malignant 2/4/2016  Hx of bilateral mastectomy  Hx of hysterectomy  Hypertension 6/26/2015  Hypertonicity of bladder  Insomnia, unspecified  Lower abdominal pain 10/26/2015  LVH (left ventricular hypertrophy) L or R Cardiomegaly  Major depressive disorder, recurrent episode, mild ([address]  Mixed hyperlipidemia  Normal cardiac stress test 2/2012  
 normal stress echo with Ef 60-65% with Dr Barbara Cisneros  Post PTCA 2/4/2016  Postmenopausal atrophic vaginitis  Precordial chest pain  Reflux 10/26/2015  Sleep apnea   
 does not sleep with cpap  Symptomatic menopausal or female climacteric states  Thromboembolus (HCC) 1970's  
 legs  Type II diabetes mellitus, well controlled ([address] 6/26/2015  Type II or unspecified type diabetes mellitus with ophthalmic manifestations, not stated as uncontrolled ([address]  Type II or unspecified type diabetes mellitus without mention of complication, uncontrolled  Urinary tract infection, site not specified Past Surgical History:  
Procedure Laterality Date  CARDIAC SURG PROCEDURE UNLIST  2016  
 1 stent placed  CARDIAC SURG PROCEDURE UNLIST  2017  
 heart cath -- no additional stents  HX APPENDECTOMY  HX BACK SURGERY    
 HX BLADDER SUSPENSION    
 HX CHOLECYSTECTOMY  HX COLECTOMY  HX COLONOSCOPY    
 HX COLONOSCOPY  10/26/2011  HX COLONOSCOPY  2017  
 wnl  HX HYSTERECTOMY [address] Bilateral, breast cancer, no chemotherapy, no radiation [address] Fusion of disc x 2  
 MN MASTECTOMY, RADICAL Bilateral   
 TOTAL ABDOM HYSTERECTOMY Prior to Admission Medications Prescriptions Last Dose Informant Patient Reported? Taking? GLYCERIN/PROPYLENE GLYCOL (LUBRICANT EYE DROPS, GLYC-PG, OP)   Yes No  
Sig: as needed. albuterol (PROAIR HFA) 90 mcg/actuation inhaler   No No  
Sig: Take 2 Puffs by inhalation every four (4) hours as needed for Wheezing. aspirin delayed-release 81 mg tablet   Yes No  
Sig: Take  by mouth daily. celecoxib (CELEBREX) 200 mg capsule   No No  
Sig: Take 1 Cap by mouth two (2) times a day. cyclobenzaprine (FLEXERIL) 10 mg tablet   No No  
Sig: Take 1 Tab by mouth three (3) times daily as needed for Muscle Spasm(s). escitalopram oxalate (LEXAPRO) 10 mg tablet   No No  
Sig: Take 1 Tab by mouth daily. ferrous sulfate (IRON) 325 mg (65 mg iron) EC tablet   No No  
Sig: Take 1 Tab by mouth Daily (before breakfast). fludrocortisone (FLORINEF) 0.1 mg tablet   No No  
Sig: Take 1 Tab by mouth daily. fluticasone (FLONASE) 50 mcg/actuation nasal spray   No No  
Si Sprays by Both Nostrils route daily as needed. metFORMIN (GLUCOPHAGE) 1,000 mg tablet   No No  
Sig: Take 1 Tab by mouth two (2) times daily (with meals). nitroglycerin (NITROSTAT) 0.4 mg SL tablet   No No  
Sig: TAke one pill every 5 minutes at onset of chest pain, repeat 3 times, call 911 if it doesn't resolve  
pitavastatin calcium (LIVALO) 2 mg tablet   No No  
Sig: Take 1 Tab by mouth daily. Facility-Administered Medications: None Adult Exam  
General: alert, no acute distress Head: normocephalic, atraumatic ENT: moist mucous membranes Neck: supple, non-tender; full range of motion Cardiovascular: regular rate and rhythm, normal peripheral perfusion, no edema Respiratory:  normal respirations; no wheezing, rales or rhonchi Gastrointestinal: soft, nontender to palpation; Rt cva tenderness. no rebound or guarding, no peritoneal signs, no distension Back: non-tender, full range of motion Musculoskeletal: normal range of motion, normal strength, no gross deformities Neurological: alert and oriented x 4, no gross focal deficits; normal speech Psychiatric: cooperative; appropriate mood and affect MDM: 
Suspicious for kidney stone, UTI, pyelonephritis. Low suspicion for bowel perforation. We will give pain medication, CT urogram for assessment 2:41 PM 
CT unremarkable. Urine 3+ bacteria. Concern for sterile pyuria. We'll give Keflex. We'll give pain medication. She has good distal pulses. I do not suspect dissection. Do not suspect acute intra-abdominal surgical pathology, epidural abscess, cauda equina, conus medullaris syndrome. Ct Urogram Wo Cont Result Date: 1/19/2019 HISTORY: Cholecystectomy, appendectomy, and diverticulitis. Right flank pain with radiation into the right lower quadrant. Exam: CT urogram Technique: Thin section axial CT images are obtained from the lung bases to the cyst. Radiation dose reduction techniques were used for this study. Our CT scanners use one or all of the following: Automated exposure control, adjustment of the mA and/or kV according to patient size, use of iterative reconstruction. Comparison: 6/18/2015 FINDINGS: There is mild bibasilar atelectasis present. CT abdomen:  No contour deforming abnormality involving the liver and spleen, though these are incompletely evaluated. Clips are present from prior cholecystectomy. The adrenal glands are unremarkable. Limited evaluation the bowel loops in the upper abdomen is unremarkable. There is no definite upper abdominal lymphadenopathy. 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 No renal or ureteral calculi demonstrated. There is a cyst of the lower pole of the left kidney, as was seen previously. CT pelvis: There is diverticulosis present without CT evidence of diverticulitis. There is no pelvic adenopathy. There is no free fluid or free air present within the pelvis. Bone window evaluation demonstrates no aggressive osseous lesions. IMPRESSION: 1. No evidence of obstructive uropathy. 2. 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 3. Diverticulosis without CT evidence of diverticulitis. 4. Status post cholecystectomy. Recent Results (from the past 12 hour(s)) CBC W/O DIFF Collection Time: 01/19/19 11:50 AM  
Result Value Ref Range WBC 6.1 4.3 - 11.1 K/uL  
 RBC 3.69 (L) 4.05 - 5.2 M/uL  
 HGB 11.7 11.7 - 15.4 g/dL HCT 36.5 35.8 - 46.3 % MCV 98.9 (H) 79.6 - 97.8 FL  
 MCH 31.7 26.1 - 32.9 PG  
 MCHC 32.1 31.4 - 35.0 g/dL  
 RDW 12.6 11.9 - 14.6 % PLATELET 991 675 - 933 K/uL MPV 11.3 9.4 - 12.3 FL ABSOLUTE NRBC 0.00 0.0 - 0.2 K/uL METABOLIC PANEL, COMPREHENSIVE Collection Time: 01/19/19 11:50 AM  
Result Value Ref Range Sodium 138 136 - 145 mmol/L Potassium 4.0 3.5 - 5.1 mmol/L Chloride 105 98 - 107 mmol/L  
 CO2 24 21 - 32 mmol/L Anion gap 9 7 - 16 mmol/L Glucose 125 (H) 65 - 100 mg/dL BUN 15 8 - 23 MG/DL Creatinine 1.19 (H) 0.6 - 1.0 MG/DL  
 GFR est AA 57 (L) >60 ml/min/1.73m2 GFR est non-AA 47 (L) >60 ml/min/1.73m2 Calcium 8.8 8.3 - 10.4 MG/DL Bilirubin, total 0.3 0.2 - 1.1 MG/DL  
 ALT (SGPT) 25 12 - 65 U/L  
 AST (SGOT) 23 15 - 37 U/L Alk. phosphatase 111 50 - 136 U/L Protein, total 7.8 6.3 - 8.2 g/dL Albumin 4.0 3.2 - 4.6 g/dL Globulin 3.8 (H) 2.3 - 3.5 g/dL A-G Ratio 1.1 (L) 1.2 - 3.5 URINE MICROSCOPIC Collection Time: 01/19/19  1:03 PM  
Result Value Ref Range WBC 0-3 0 /hpf  
 RBC 0-3 0 /hpf Epithelial cells 0 0 /hpf Bacteria 3+ (H) 0 /hpf Casts 0 0 /lpf Dragon voice recognition software was used to create this note. Although the note has been reviewed and corrected where necessary, additional errors may have been overlooked and remain in the text.

## 2019-01-19 NOTE — DISCHARGE INSTRUCTIONS
Complete course of antibiotic. Drink plenty of fluids and take probiotic while on antibiotics to decrease risk of diarrhea. Take Ultram as needed for pain. Follow-up with your doctor.

## 2019-01-19 NOTE — ED TRIAGE NOTES
Pt is brought from home with family for right sided flank pain that radiates all the way to the front. Pain has been for the past several days. Increased urgency and frequency but with barely any urine each time. Dysuria noted.

## 2019-01-21 LAB
BACTERIA SPEC CULT: ABNORMAL
SERVICE CMNT-IMP: ABNORMAL

## 2019-01-31 ENCOUNTER — HOSPITAL ENCOUNTER (OUTPATIENT)
Dept: CT IMAGING | Age: 79
Discharge: HOME | End: 2019-01-31
Payer: MEDICARE

## 2019-01-31 DIAGNOSIS — R10.84 GENERALIZED ABDOMINAL PAIN: ICD-10-CM

## 2019-01-31 DIAGNOSIS — R10.9 RIGHT FLANK PAIN: ICD-10-CM

## 2019-01-31 DIAGNOSIS — R10.31 RIGHT LOWER QUADRANT ABDOMINAL PAIN: ICD-10-CM

## 2019-01-31 LAB — CREAT BLD-MCNC: 1.3 MG/DL (ref 0.8–1.5)

## 2019-01-31 PROCEDURE — 74176 CT ABD & PELVIS W/O CONTRAST: CPT

## 2019-01-31 PROCEDURE — 82565 ASSAY OF CREATININE: CPT

## 2019-01-31 PROCEDURE — 74011636320 HC RX REV CODE- 636/320

## 2019-01-31 RX ADMIN — DIATRIZOATE MEGLUMINE AND DIATRIZOATE SODIUM 15 ML: 660; 100 LIQUID ORAL; RECTAL at 13:50

## 2020-01-06 ENCOUNTER — HOSPITAL ENCOUNTER (OUTPATIENT)
Dept: MAMMOGRAPHY | Age: 80
Discharge: HOME | End: 2020-01-06
Attending: NURSE PRACTITIONER
Payer: MEDICARE

## 2020-01-06 DIAGNOSIS — Z78.0 POSTMENOPAUSE: ICD-10-CM

## 2020-01-06 PROCEDURE — 77080 DXA BONE DENSITY AXIAL: CPT

## 2020-01-08 NOTE — PROGRESS NOTES
Results to patient please. DEXA scan revealed that bone mineral density is within normal limits. However; she has had a decrease in her bone density from the last time. Please make sure that she is taking her calcium and vitamin D daily.

## 2020-01-23 ENCOUNTER — HOSPITAL ENCOUNTER (OUTPATIENT)
Dept: CT IMAGING | Age: 80
Discharge: HOME | End: 2020-01-23
Attending: SURGERY
Payer: MEDICARE

## 2020-01-23 DIAGNOSIS — R07.81 RIB PAIN ON LEFT SIDE: ICD-10-CM

## 2020-01-23 LAB — CREAT BLD-MCNC: 0.9 MG/DL (ref 0.8–1.5)

## 2020-01-23 PROCEDURE — 74011000258 HC RX REV CODE- 258: Performed by: SURGERY

## 2020-01-23 PROCEDURE — 71260 CT THORAX DX C+: CPT

## 2020-01-23 PROCEDURE — 82565 ASSAY OF CREATININE: CPT

## 2020-01-23 PROCEDURE — 74011636320 HC RX REV CODE- 636/320: Performed by: SURGERY

## 2020-01-23 RX ORDER — SODIUM CHLORIDE 0.9 % (FLUSH) 0.9 %
10 SYRINGE (ML) INJECTION
Status: COMPLETED | OUTPATIENT
Start: 2020-01-23 | End: 2020-01-23

## 2020-01-23 RX ADMIN — IOPAMIDOL 80 ML: 755 INJECTION, SOLUTION INTRAVENOUS at 16:07

## 2020-01-23 RX ADMIN — Medication 10 ML: at 16:07

## 2020-01-23 RX ADMIN — SODIUM CHLORIDE 100 ML: 900 INJECTION, SOLUTION INTRAVENOUS at 16:08

## 2020-07-18 ENCOUNTER — HOSPITAL ENCOUNTER (OUTPATIENT)
Dept: MRI IMAGING | Age: 80
Discharge: HOME | End: 2020-07-18
Attending: NURSE PRACTITIONER
Payer: MEDICARE

## 2020-07-18 DIAGNOSIS — M51.24 PROTRUSION OF THORACIC INTERVERTEBRAL DISC: ICD-10-CM

## 2020-07-18 DIAGNOSIS — M54.6 ACUTE BILATERAL THORACIC BACK PAIN: ICD-10-CM

## 2020-07-18 PROCEDURE — 72146 MRI CHEST SPINE W/O DYE: CPT

## 2021-03-11 ENCOUNTER — HOSPITAL ENCOUNTER (OUTPATIENT)
Dept: LAB | Age: 81
Discharge: HOME | End: 2021-03-11
Payer: MEDICARE

## 2021-03-11 DIAGNOSIS — Z13.29 SCREENING FOR THYROID DISORDER: ICD-10-CM

## 2021-03-11 DIAGNOSIS — Z13.21 SCREENING FOR MALNUTRITION: ICD-10-CM

## 2021-03-11 PROBLEM — F09 COGNITIVE DISORDER: Status: ACTIVE | Noted: 2021-03-11

## 2021-03-11 LAB
TSH SERPL DL<=0.005 MIU/L-ACNC: 1.8 UIU/ML (ref 0.36–3.74)
VIT B12 SERPL-MCNC: 214 PG/ML (ref 193–986)

## 2021-03-11 PROCEDURE — 84443 ASSAY THYROID STIM HORMONE: CPT

## 2021-03-11 PROCEDURE — 82607 VITAMIN B-12: CPT

## 2021-03-11 PROCEDURE — 36415 COLL VENOUS BLD VENIPUNCTURE: CPT

## 2021-04-10 PROBLEM — Z13.29 SCREENING FOR THYROID DISORDER: Status: RESOLVED | Noted: 2021-03-11 | Resolved: 2021-04-10

## 2021-04-29 ENCOUNTER — HOSPITAL ENCOUNTER (OUTPATIENT)
Dept: MRI IMAGING | Age: 81
Discharge: HOME | End: 2021-04-29
Attending: PSYCHIATRY & NEUROLOGY
Payer: MEDICARE

## 2021-04-29 DIAGNOSIS — F09 COGNITIVE DISORDER: ICD-10-CM

## 2021-04-29 PROCEDURE — 70551 MRI BRAIN STEM W/O DYE: CPT

## 2021-05-17 ENCOUNTER — HOSPITAL ENCOUNTER (OUTPATIENT)
Dept: LAB | Age: 81
Discharge: HOME | End: 2021-05-17
Payer: MEDICARE

## 2021-05-17 DIAGNOSIS — D64.9 ANEMIA, UNSPECIFIED TYPE: ICD-10-CM

## 2021-05-17 PROBLEM — D75.89 MACROCYTOSIS: Status: ACTIVE | Noted: 2021-05-17

## 2021-05-17 LAB
25(OH)D3 SERPL-MCNC: 29.8 NG/ML (ref 30–100)
ALBUMIN SERPL-MCNC: 4 G/DL (ref 3.2–4.6)
ALBUMIN/GLOB SERPL: 1 {RATIO} (ref 1.2–3.5)
ALP SERPL-CCNC: 71 U/L (ref 50–136)
ALT SERPL-CCNC: 19 U/L (ref 12–65)
ANION GAP SERPL CALC-SCNC: 6 MMOL/L (ref 7–16)
AST SERPL-CCNC: 17 U/L (ref 15–37)
BASOPHILS # BLD: 0.1 K/UL (ref 0–0.2)
BASOPHILS NFR BLD: 1 % (ref 0–2)
BILIRUB SERPL-MCNC: 0.5 MG/DL (ref 0.2–1.1)
BUN SERPL-MCNC: 15 MG/DL (ref 8–23)
CALCIUM SERPL-MCNC: 9.5 MG/DL (ref 8.3–10.4)
CANCER AG15-3 SERPL-ACNC: 19.8 U/ML (ref 1–35)
CHLORIDE SERPL-SCNC: 102 MMOL/L (ref 98–107)
CO2 SERPL-SCNC: 28 MMOL/L (ref 21–32)
CREAT SERPL-MCNC: 1.2 MG/DL (ref 0.6–1)
DIFFERENTIAL METHOD BLD: ABNORMAL
EOSINOPHIL # BLD: 0.1 K/UL (ref 0–0.8)
EOSINOPHIL NFR BLD: 3 % (ref 0.5–7.8)
ERYTHROCYTE [DISTWIDTH] IN BLOOD BY AUTOMATED COUNT: 12.3 % (ref 11.9–14.6)
FERRITIN SERPL-MCNC: 69 NG/ML (ref 8–388)
GLOBULIN SER CALC-MCNC: 4 G/DL (ref 2.3–3.5)
GLUCOSE SERPL-MCNC: 80 MG/DL (ref 65–100)
HAPTOGLOB SERPL-MCNC: 237 MG/DL (ref 30–200)
HAV IGM SER QL: ABNORMAL
HBV CORE IGM SER QL: NONREACTIVE
HBV SURFACE AB SERPL IA-ACNC: <3.1 MIU/ML
HBV SURFACE AG SER QL: NONREACTIVE
HCT VFR BLD AUTO: 35.9 % (ref 35.8–46.3)
HCV AB SER QL: NONREACTIVE
HGB BLD-MCNC: 11.7 G/DL (ref 11.7–15.4)
IMM GRANULOCYTES # BLD AUTO: 0 K/UL (ref 0–0.5)
IMM GRANULOCYTES NFR BLD AUTO: 0 % (ref 0–5)
IRON SATN MFR SERPL: 36 %
IRON SERPL-MCNC: 123 UG/DL (ref 35–150)
LDH SERPL L TO P-CCNC: 207 U/L (ref 110–210)
LYMPHOCYTES # BLD: 1.9 K/UL (ref 0.5–4.6)
LYMPHOCYTES NFR BLD: 41 % (ref 13–44)
MCH RBC QN AUTO: 31.6 PG (ref 26.1–32.9)
MCHC RBC AUTO-ENTMCNC: 32.6 G/DL (ref 31.4–35)
MCV RBC AUTO: 97 FL (ref 79.6–97.8)
MONOCYTES # BLD: 0.4 K/UL (ref 0.1–1.3)
MONOCYTES NFR BLD: 9 % (ref 4–12)
NEUTS SEG # BLD: 2.1 K/UL (ref 1.7–8.2)
NEUTS SEG NFR BLD: 46 % (ref 43–78)
NRBC # BLD: 0 K/UL (ref 0–0.2)
PLATELET # BLD AUTO: 221 K/UL (ref 150–450)
PMV BLD AUTO: 11.7 FL (ref 9.4–12.3)
POTASSIUM SERPL-SCNC: 4.4 MMOL/L (ref 3.5–5.1)
PROT SERPL-MCNC: 8 G/DL (ref 6.3–8.2)
RBC # BLD AUTO: 3.7 M/UL (ref 4.05–5.2)
SODIUM SERPL-SCNC: 136 MMOL/L (ref 136–145)
TIBC SERPL-MCNC: 345 UG/DL (ref 250–450)
WBC # BLD AUTO: 4.5 K/UL (ref 4.3–11.1)

## 2021-05-17 PROCEDURE — 84238 ASSAY NONENDOCRINE RECEPTOR: CPT

## 2021-05-17 PROCEDURE — 83010 ASSAY OF HAPTOGLOBIN QUANT: CPT

## 2021-05-17 PROCEDURE — 86706 HEP B SURFACE ANTIBODY: CPT

## 2021-05-17 PROCEDURE — 84630 ASSAY OF ZINC: CPT

## 2021-05-17 PROCEDURE — 82306 VITAMIN D 25 HYDROXY: CPT

## 2021-05-17 PROCEDURE — 86300 IMMUNOASSAY TUMOR CA 15-3: CPT

## 2021-05-17 PROCEDURE — 83550 IRON BINDING TEST: CPT

## 2021-05-17 PROCEDURE — 82180 ASSAY OF ASCORBIC ACID: CPT

## 2021-05-17 PROCEDURE — 82525 ASSAY OF COPPER: CPT

## 2021-05-17 PROCEDURE — 80053 COMPREHEN METABOLIC PANEL: CPT

## 2021-05-17 PROCEDURE — 83615 LACTATE (LD) (LDH) ENZYME: CPT

## 2021-05-17 PROCEDURE — 85025 COMPLETE CBC W/AUTO DIFF WBC: CPT

## 2021-05-17 PROCEDURE — 82728 ASSAY OF FERRITIN: CPT

## 2021-05-17 PROCEDURE — 80074 ACUTE HEPATITIS PANEL: CPT

## 2021-05-17 PROCEDURE — 36415 COLL VENOUS BLD VENIPUNCTURE: CPT

## 2021-05-17 PROCEDURE — 86038 ANTINUCLEAR ANTIBODIES: CPT

## 2021-05-18 LAB
ANA SER QL: NEGATIVE
CANCER AG27-29 SERPL-ACNC: 26.9 U/ML (ref 0–38.6)
PATH REV BLD -IMP: NORMAL
TRANSFERRIN SERPL-SCNC: 15.1 NMOL/L (ref 12.2–27.3)

## 2021-05-19 LAB
COPPER SERPL-MCNC: 162 UG/DL (ref 80–158)
ZINC SERPL-MCNC: 100 UG/DL (ref 44–115)

## 2021-05-20 LAB — VIT C SERPL-MCNC: 0.9 MG/DL (ref 0.4–2)

## 2021-06-28 ENCOUNTER — HOSPITAL ENCOUNTER (OUTPATIENT)
Dept: LAB | Age: 81
Discharge: HOME | End: 2021-06-28
Payer: MEDICARE

## 2021-06-28 DIAGNOSIS — D64.9 ANEMIA, UNSPECIFIED TYPE: ICD-10-CM

## 2021-06-28 DIAGNOSIS — R79.89 LOW VITAMIN D LEVEL: ICD-10-CM

## 2021-06-28 DIAGNOSIS — R76.8 ABNORMAL HEPATITIS SEROLOGY: ICD-10-CM

## 2021-06-28 LAB
25(OH)D3 SERPL-MCNC: 27.8 NG/ML (ref 30–100)
ALBUMIN SERPL-MCNC: 3.7 G/DL (ref 3.2–4.6)
ALBUMIN/GLOB SERPL: 1 {RATIO} (ref 1.2–3.5)
ALP SERPL-CCNC: 71 U/L (ref 50–136)
ALT SERPL-CCNC: 25 U/L (ref 12–65)
ANION GAP SERPL CALC-SCNC: 4 MMOL/L (ref 7–16)
AST SERPL-CCNC: 23 U/L (ref 15–37)
BASOPHILS # BLD: 0.1 K/UL (ref 0–0.2)
BASOPHILS NFR BLD: 1 % (ref 0–2)
BILIRUB SERPL-MCNC: 0.3 MG/DL (ref 0.2–1.1)
BUN SERPL-MCNC: 17 MG/DL (ref 8–23)
CALCIUM SERPL-MCNC: 8.9 MG/DL (ref 8.3–10.4)
CHLORIDE SERPL-SCNC: 104 MMOL/L (ref 98–107)
CO2 SERPL-SCNC: 27 MMOL/L (ref 21–32)
CREAT SERPL-MCNC: 1.3 MG/DL (ref 0.6–1)
DIFFERENTIAL METHOD BLD: ABNORMAL
EOSINOPHIL # BLD: 0.3 K/UL (ref 0–0.8)
EOSINOPHIL NFR BLD: 6 % (ref 0.5–7.8)
ERYTHROCYTE [DISTWIDTH] IN BLOOD BY AUTOMATED COUNT: 13.7 % (ref 11.9–14.6)
FERRITIN SERPL-MCNC: 27 NG/ML (ref 8–388)
GLOBULIN SER CALC-MCNC: 3.8 G/DL (ref 2.3–3.5)
GLUCOSE SERPL-MCNC: 162 MG/DL (ref 65–100)
HCT VFR BLD AUTO: 35.7 % (ref 35.8–46.3)
HGB BLD-MCNC: 11.5 G/DL (ref 11.7–15.4)
IMM GRANULOCYTES # BLD AUTO: 0 K/UL (ref 0–0.5)
IMM GRANULOCYTES NFR BLD AUTO: 0 % (ref 0–5)
IRON SATN MFR SERPL: 30 %
IRON SERPL-MCNC: 103 UG/DL (ref 35–150)
LYMPHOCYTES # BLD: 2 K/UL (ref 0.5–4.6)
LYMPHOCYTES NFR BLD: 35 % (ref 13–44)
MCH RBC QN AUTO: 31.5 PG (ref 26.1–32.9)
MCHC RBC AUTO-ENTMCNC: 32.2 G/DL (ref 31.4–35)
MCV RBC AUTO: 97.8 FL (ref 79.6–97.8)
MONOCYTES # BLD: 0.5 K/UL (ref 0.1–1.3)
MONOCYTES NFR BLD: 8 % (ref 4–12)
NEUTS SEG # BLD: 2.7 K/UL (ref 1.7–8.2)
NEUTS SEG NFR BLD: 49 % (ref 43–78)
NRBC # BLD: 0 K/UL (ref 0–0.2)
PLATELET # BLD AUTO: 163 K/UL (ref 150–450)
PMV BLD AUTO: 11.6 FL (ref 9.4–12.3)
POTASSIUM SERPL-SCNC: 4.8 MMOL/L (ref 3.5–5.1)
PROT SERPL-MCNC: 7.5 G/DL (ref 6.3–8.2)
RBC # BLD AUTO: 3.65 M/UL (ref 4.05–5.2)
SODIUM SERPL-SCNC: 135 MMOL/L (ref 136–145)
TIBC SERPL-MCNC: 342 UG/DL (ref 250–450)
WBC # BLD AUTO: 5.5 K/UL (ref 4.3–11.1)

## 2021-06-28 PROCEDURE — 36415 COLL VENOUS BLD VENIPUNCTURE: CPT

## 2021-06-28 PROCEDURE — 80053 COMPREHEN METABOLIC PANEL: CPT

## 2021-06-28 PROCEDURE — 82728 ASSAY OF FERRITIN: CPT

## 2021-06-28 PROCEDURE — 82306 VITAMIN D 25 HYDROXY: CPT

## 2021-06-28 PROCEDURE — 83540 ASSAY OF IRON: CPT

## 2021-06-28 PROCEDURE — 85025 COMPLETE CBC W/AUTO DIFF WBC: CPT

## 2021-09-10 PROBLEM — I25.10 CORONARY ARTERY DISEASE INVOLVING NATIVE CORONARY ARTERY OF NATIVE HEART WITHOUT ANGINA PECTORIS: Status: ACTIVE | Noted: 2021-09-10

## 2022-01-10 ENCOUNTER — APPOINTMENT (OUTPATIENT)
Dept: GENERAL RADIOLOGY | Age: 82
End: 2022-01-10
Attending: EMERGENCY MEDICINE
Payer: MEDICARE

## 2022-01-10 ENCOUNTER — HOSPITAL ENCOUNTER (EMERGENCY)
Age: 82
Discharge: HOME | End: 2022-01-10
Attending: EMERGENCY MEDICINE
Payer: MEDICARE

## 2022-01-10 VITALS
OXYGEN SATURATION: 97 % | TEMPERATURE: 98 F | HEART RATE: 69 BPM | WEIGHT: 172 LBS | BODY MASS INDEX: 27 KG/M2 | DIASTOLIC BLOOD PRESSURE: 66 MMHG | HEIGHT: 67 IN | RESPIRATION RATE: 21 BRPM | SYSTOLIC BLOOD PRESSURE: 157 MMHG

## 2022-01-10 DIAGNOSIS — R07.9 CHEST PAIN, UNSPECIFIED TYPE: Primary | ICD-10-CM

## 2022-01-10 DIAGNOSIS — R03.0 ELEVATED BLOOD PRESSURE READING: ICD-10-CM

## 2022-01-10 LAB
ALBUMIN SERPL-MCNC: 3.4 G/DL (ref 3.2–4.6)
ALBUMIN/GLOB SERPL: 0.8 {RATIO} (ref 1.2–3.5)
ALP SERPL-CCNC: 87 U/L (ref 50–136)
ALT SERPL-CCNC: 21 U/L (ref 12–65)
ANION GAP SERPL CALC-SCNC: 7 MMOL/L (ref 7–16)
AST SERPL-CCNC: 45 U/L (ref 15–37)
ATRIAL RATE: 75 BPM
BASOPHILS # BLD: 0.1 K/UL (ref 0–0.2)
BASOPHILS NFR BLD: 1 % (ref 0–2)
BILIRUB SERPL-MCNC: 0.4 MG/DL (ref 0.2–1.1)
BUN SERPL-MCNC: 16 MG/DL (ref 8–23)
CALCIUM SERPL-MCNC: 8.9 MG/DL (ref 8.3–10.4)
CALCULATED P AXIS, ECG09: 63 DEGREES
CALCULATED R AXIS, ECG10: 40 DEGREES
CALCULATED T AXIS, ECG11: 70 DEGREES
CHLORIDE SERPL-SCNC: 104 MMOL/L (ref 98–107)
CO2 SERPL-SCNC: 24 MMOL/L (ref 21–32)
CREAT SERPL-MCNC: 1.19 MG/DL (ref 0.6–1)
D DIMER PPP FEU-MCNC: 0.51 UG/ML(FEU)
DIAGNOSIS, 93000: NORMAL
DIFFERENTIAL METHOD BLD: ABNORMAL
EOSINOPHIL # BLD: 0.3 K/UL (ref 0–0.8)
EOSINOPHIL NFR BLD: 4 % (ref 0.5–7.8)
ERYTHROCYTE [DISTWIDTH] IN BLOOD BY AUTOMATED COUNT: 12.7 % (ref 11.9–14.6)
GLOBULIN SER CALC-MCNC: 4.3 G/DL (ref 2.3–3.5)
GLUCOSE SERPL-MCNC: 189 MG/DL (ref 65–100)
HCT VFR BLD AUTO: 37 % (ref 35.8–46.3)
HGB BLD-MCNC: 11.8 G/DL (ref 11.7–15.4)
IMM GRANULOCYTES # BLD AUTO: 0 K/UL (ref 0–0.5)
IMM GRANULOCYTES NFR BLD AUTO: 0 % (ref 0–5)
LIPASE SERPL-CCNC: 179 U/L (ref 73–393)
LYMPHOCYTES # BLD: 1.6 K/UL (ref 0.5–4.6)
LYMPHOCYTES NFR BLD: 23 % (ref 13–44)
MAGNESIUM SERPL-MCNC: 2.2 MG/DL (ref 1.8–2.4)
MCH RBC QN AUTO: 30.9 PG (ref 26.1–32.9)
MCHC RBC AUTO-ENTMCNC: 31.9 G/DL (ref 31.4–35)
MCV RBC AUTO: 96.9 FL (ref 79.6–97.8)
MONOCYTES # BLD: 0.5 K/UL (ref 0.1–1.3)
MONOCYTES NFR BLD: 6 % (ref 4–12)
NEUTS SEG # BLD: 4.6 K/UL (ref 1.7–8.2)
NEUTS SEG NFR BLD: 65 % (ref 43–78)
NRBC # BLD: 0 K/UL (ref 0–0.2)
P-R INTERVAL, ECG05: 166 MS
PLATELET # BLD AUTO: 181 K/UL (ref 150–450)
PMV BLD AUTO: 11.9 FL (ref 9.4–12.3)
POTASSIUM SERPL-SCNC: 5.3 MMOL/L (ref 3.5–5.1)
PROT SERPL-MCNC: 7.7 G/DL (ref 6.3–8.2)
Q-T INTERVAL, ECG07: 378 MS
QRS DURATION, ECG06: 86 MS
QTC CALCULATION (BEZET), ECG08: 422 MS
RBC # BLD AUTO: 3.82 M/UL (ref 4.05–5.2)
SODIUM SERPL-SCNC: 135 MMOL/L (ref 136–145)
TROPONIN-HIGH SENSITIVITY: 10.9 PG/ML (ref 0–14)
TROPONIN-HIGH SENSITIVITY: 8.1 PG/ML (ref 0–14)
VENTRICULAR RATE, ECG03: 75 BPM
WBC # BLD AUTO: 7 K/UL (ref 4.3–11.1)

## 2022-01-10 PROCEDURE — 93005 ELECTROCARDIOGRAM TRACING: CPT

## 2022-01-10 PROCEDURE — 85025 COMPLETE CBC W/AUTO DIFF WBC: CPT

## 2022-01-10 PROCEDURE — 80053 COMPREHEN METABOLIC PANEL: CPT

## 2022-01-10 PROCEDURE — 83735 ASSAY OF MAGNESIUM: CPT

## 2022-01-10 PROCEDURE — 99285 EMERGENCY DEPT VISIT HI MDM: CPT

## 2022-01-10 PROCEDURE — 84484 ASSAY OF TROPONIN QUANT: CPT

## 2022-01-10 PROCEDURE — 85379 FIBRIN DEGRADATION QUANT: CPT

## 2022-01-10 PROCEDURE — 71045 X-RAY EXAM CHEST 1 VIEW: CPT

## 2022-01-10 PROCEDURE — 83690 ASSAY OF LIPASE: CPT

## 2022-01-10 RX ORDER — SODIUM CHLORIDE 0.9 % (FLUSH) 0.9 %
5-10 SYRINGE (ML) INJECTION AS NEEDED
Status: DISCONTINUED | OUTPATIENT
Start: 2022-01-10 | End: 2022-01-11 | Stop reason: HOSPADM

## 2022-01-10 RX ORDER — SODIUM CHLORIDE 0.9 % (FLUSH) 0.9 %
5-10 SYRINGE (ML) INJECTION EVERY 8 HOURS
Status: DISCONTINUED | OUTPATIENT
Start: 2022-01-10 | End: 2022-01-11 | Stop reason: HOSPADM

## 2022-01-10 NOTE — ED TRIAGE NOTES
Patient arrives via [facility] with complaint of neck pain that radiated to her chest and left shoulder that start last night.     /90 hr 70 SPO2 95 ra bgl 255 temp 98.4    20 R forearm     324 asa 1 nitro prior to EMS

## 2022-01-10 NOTE — ED TRIAGE NOTES
Pt BIB EMS reports heaviness to her chest, states that he had an onset of left sided chest pain last night that is now radiating to her shoulder and neck. States relief of the pain with nitro.

## 2022-01-11 NOTE — ED PROVIDER NOTES
28-year-old female with a history of coronary artery disease and 1 stent, asthma, breast cancer, high cholesterol, hypertension, GERD, diabetes, now off all medications, thromboembolus presents with sudden onset severe left shoulder pain radiating to her chest and back that lasted about 45 minutes. Pain resolved after taking nitroglycerin and ASA with EMS. She states pain worsened with deep breathing and movement. She was unable to lie flat. She described pain as a heaviness. She denies associated shortness of breath, sweating, nausea, or vomiting. Family states when EMS arrived, her blood pressure was in the 200s over 100s. She does not take medication for hypertension. She has been pain-free since arrival to the emergency department. Denies recent cough or fever. Stress test 10/19/21:  Nuclear Stress Test    1. Stress EKG: Normal.   2. SPECT Perfusion Imaging: Normal Perfusion. 3. LV Systolic Function is normal.   4. Risk Assessment: Low Risk Scan. Comments:  Routine follow up with cardiologist recommended. Interpretation Summary    · Baseline ECG: Normal sinus rhythm. · SPECT: Left ventricular function post-stress was normal. Calculated ejection fraction is 67% (normal EF value is equal to or greater than 50%). Left ventricular wall motion was normal at stress, no regional wall motion abnormality noted. There is no evidence of transient ischemic dilation (TID). Chest Pain (Angina)   Associated symptoms include back pain. Pertinent negatives include no abdominal pain, no cough, no fever, no headaches, no nausea, no shortness of breath and no vomiting.         Past Medical History:   Diagnosis Date    Abnormal EKG     ACE inhibitor intolerance 2014    cough with lisinopril    Allergic rhinitis 11/10/2014    Allergic rhinitis, cause unspecified     Arthritis     Asthma 11/10/2014    Backache, unspecified     CAD (coronary artery disease) 2/4/2016    Cancer ([facility]     breast  Continuous leakage(788.37)     Depressive disorder, not elsewhere classified     Diverticulitis large intestine     Diverticulitis large intestine w/o perforation or abscess w/o bleeding 10/26/2015    Diverticulitis of colon (without mention of hemorrhage)(562.11)     Dyslipidemia     Dyspnea     Shortness of breath    Encounter for long-term (current) use of aspirin     Esophageal dysphagia 10/26/2015    Esophageal reflux     Essential hypertension     External hemorrhoids without mention of complication     Gastritis due to nonsteroidal anti-inflammatory drug (NSAID) 6/26/2015    Generalized anxiety disorder     GERD (gastroesophageal reflux disease)     Heart disease, unspecified     HLD (hyperlipidemia)     HTN (hypertension), malignant 2/4/2016    Hx of bilateral mastectomy     Hx of hysterectomy     Hypertension 6/26/2015    Hypertonicity of bladder     Insomnia, unspecified     Lower abdominal pain 10/26/2015    LVH (left ventricular hypertrophy)     L or R Cardiomegaly    Major depressive disorder, recurrent episode, mild (HCC)     Mixed hyperlipidemia     Normal cardiac stress test 2/2012    normal stress echo with Ef 60-65% with Dr Shaw Letters [facility] 2/4/2016    Postmenopausal atrophic vaginitis     Precordial chest pain     Reflux 10/26/2015    Sleep apnea     does not sleep with cpap    Symptomatic menopausal or female climacteric states     Thromboembolus [facility]) [facility]'s    legs    Type II diabetes mellitus, well controlled ([address] 6/26/2015    Type II or unspecified type diabetes mellitus with ophthalmic manifestations, not stated as uncontrolled ([address]     Type II or unspecified type diabetes mellitus without mention of complication, uncontrolled     Urinary tract infection, site not specified        Past Surgical History:   Procedure Laterality Date    HX APPENDECTOMY      HX BACK SURGERY      HX BLADDER SUSPENSION      HX CHOLECYSTECTOMY      HX COLONOSCOPY  HX COLONOSCOPY  10/26/2011    HX COLONOSCOPY  2017    wnl    HX HYSTERECTOMY      HX MASTECTOMY Bilateral 1989    Bilateral, breast cancer, no chemotherapy, no radiation    HX ORTHOPAEDIC  1990s    Fusion of disc x 2    HX TOTAL COLECTOMY      FL CARDIAC SURG PROCEDURE UNLIST  2016    1 stent placed    FL CARDIAC SURG PROCEDURE UNLIST  2017    heart cath -- no additional stents    FL MASTECTOMY, RADICAL Bilateral     FL TOTAL ABDOM HYSTERECTOMY           Family History:   Problem Relation Age of Onset    Deep Vein Thrombosis Mother     Coronary Art Dis Father 48    Other Other         No family history of pulmonary concerns    Hypertension Other     Cancer Sister         BREAST    Kidney Disease Brother     Lung Disease Brother     Heart Disease Other         2 MIs    Diabetes Son         NON-INSULIN DEPENDENT    Heart Disease Other         Ischemic Heart Disease    Coronary Art Dis Brother     Other Brother         SCD    Other Sister         PCI's       Social History     Socioeconomic History    Marital status:      Spouse name: Not on file    Number of children: 3    Years of education: Not on file    Highest education level: Not on file   Occupational History    Occupation:      Employer: RETIRED     Comment: At a [employer], denies industrial toxin exposure   Tobacco Use    Smoking status: Never Smoker    Smokeless tobacco: Never Used    Tobacco comment: Secondhand smoke exposure for 16 years from her  cigarettes   Substance and Sexual Activity    Alcohol use: Never    Drug use: No    Sexual activity: Not Currently   Other Topics Concern    Not on file   Social History Narrative    Lifelong nonsmoker with 16 year secondhand smoke exposure history. Worked as a  at [employer], denies industrial toxin exposure history. , 3 children who are healthy. Denies alcohol use. Has always lived in [place].     No pets, no history of pet bird. Social Determinants of Health     Financial Resource Strain: Medium Risk    Difficulty of Paying Living Expenses: Somewhat hard   Food Insecurity: No Food Insecurity    Worried About Running Out of Food in the Last Year: Never true    Bipin of Food in the Last Year: Never true   Transportation Needs: No Transportation Needs    Lack of Transportation (Medical): No    Lack of Transportation (Non-Medical): No   Physical Activity:     Days of Exercise per Week: Not on file    Minutes of Exercise per Session: Not on file   Stress:     Feeling of Stress : Not on file   Social Connections:     Frequency of Communication with Friends and Family: Not on file    Frequency of Social Gatherings with Friends and Family: Not on file    Attends Oriental orthodox Services: Not on file    Active Member of [address] [place] [address] or Organizations: Not on file    Attends Club or Organization Meetings: Not on file    Marital Status: Not on file   Intimate Partner Violence:     Fear of Current or Ex-Partner: Not on file    Emotionally Abused: Not on file    Physically Abused: Not on file    Sexually Abused: Not on file   Housing Stability:     Unable to Pay for Housing in the Last Year: Not on file    Number of [place] in the Last Year: Not on file    Unstable Housing in the Last Year: Not on file         ALLERGIES: Codeine, Crestor [rosuvastatin], Esomeprazole, Gabapentin, Lipitor [atorvastatin], Lisinopril, Pravachol [pravastatin], Sulfa (sulfonamide antibiotics), and Sulfur    Review of Systems   Constitutional: Negative for fever. HENT: Negative for hearing loss. Eyes: Negative for visual disturbance. Respiratory: Negative for cough and shortness of breath. Cardiovascular: Positive for chest pain. Gastrointestinal: Negative for abdominal pain, diarrhea, nausea and vomiting. Musculoskeletal: Positive for arthralgias and back pain. Skin: Negative for rash. Neurological: Negative for headaches. Psychiatric/Behavioral: Negative for confusion. All other systems reviewed and are negative. Vitals:    01/10/22 1333   BP: (!) 152/70   Pulse: 90   Resp: 20   Temp: 98.4 °F (36.9 °C)   SpO2: 96%   Weight: 78 kg (172 lb)   Height: 5' 7\" (1.702 m)            Physical Exam  Vitals and nursing note reviewed. Constitutional:       Appearance: Normal appearance. She is well-developed. HENT:      Head: Normocephalic and atraumatic. Nose: Nose normal.      Mouth/Throat:      Mouth: Mucous membranes are moist.   Eyes:      Pupils: Pupils are equal, round, and reactive to light. Cardiovascular:      Rate and Rhythm: Regular rhythm. Heart sounds: Normal heart sounds. Pulmonary:      Effort: Pulmonary effort is normal.      Breath sounds: Normal breath sounds. Chest:      Chest wall: Tenderness present. Abdominal:      Palpations: Abdomen is soft. Tenderness: There is no abdominal tenderness. Musculoskeletal:         General: No deformity. Normal range of motion. Cervical back: Normal range of motion and neck supple. Skin:     General: Skin is warm and dry. Neurological:      General: No focal deficit present. Mental Status: She is alert. Mental status is at baseline. Psychiatric:         Mood and Affect: Mood normal.         Behavior: Behavior normal.          MDM  Number of Diagnoses or Management Options  Diagnosis management comments: Parts of this document were created using dragon voice recognition software. The chart has been reviewed but errors may still be present. I wore appropriate PPE throughout this patient's ED visit. Vini Waldrop MD, 7:50 PM    9:28 PM  BP elevated. Normal dimer and trop. Advised cardiology follow up for BP management. I discussed the results of all labs, procedures, radiographs, and treatments with the patient and available family. Treatment plan is agreed upon and the patient is ready for discharge.   Questions about treatment in the ED and differential diagnosis of presenting condition were answered. Patient was given verbal discharge instructions including, but not limited to, importance of returning to the emergency department for any concern of worsening or continued symptoms. Instructions were given to follow up with a primary care provider or specialist within 1-2 days. Adverse effects of medications, if prescribed, were discussed and patient was advised to refrain from significant physical activity until followed up by primary care physician and to not drive or operate heavy machinery after taking any sedating substances.            Amount and/or Complexity of Data Reviewed  Clinical lab tests: reviewed and ordered (Results for orders placed or performed during the hospital encounter of 01/10/22  -TROPONIN-HIGH SENSITIVITY:        Result                      Value             Ref Range           Troponin-High Sensitiv*     8.1               0 - 14 pg/mL   -TROPONIN-HIGH SENSITIVITY:        Result                      Value             Ref Range           Troponin-High Sensitiv*     10.9              0 - 14 pg/mL   -CBC WITH AUTOMATED DIFF:        Result                      Value             Ref Range           WBC                         7.0               4.3 - 11.1 K*       RBC                         3.82 (L)          4.05 - 5.2 M*       HGB                         11.8              11.7 - 15.4 *       HCT                         37.0              35.8 - 46.3 %       MCV                         96.9              79.6 - 97.8 *       MCH                         30.9              26.1 - 32.9 *       MCHC                        31.9              31.4 - 35.0 *       RDW                         12.7              11.9 - 14.6 %       PLATELET                    181               150 - 450 K/*       MPV                         11.9              9.4 - 12.3 FL       ABSOLUTE NRBC               0.00              0.0 - 0.2 K/*       DF AUTOMATED                             NEUTROPHILS                 65                43 - 78 %           LYMPHOCYTES                 23                13 - 44 %           MONOCYTES                   6                 4.0 - 12.0 %        EOSINOPHILS                 4                 0.5 - 7.8 %         BASOPHILS                   1                 0.0 - 2.0 %         IMMATURE GRANULOCYTES       0                 0.0 - 5.0 %         ABS. NEUTROPHILS            4.6               1.7 - 8.2 K/*       ABS. LYMPHOCYTES            1.6               0.5 - 4.6 K/*       ABS. MONOCYTES              0.5               0.1 - 1.3 K/*       ABS. EOSINOPHILS            0.3               0.0 - 0.8 K/*       ABS. BASOPHILS              0.1               0.0 - 0.2 K/*       ABS. IMM. GRANS.            0.0               0.0 - 0.5 K/*  -METABOLIC PANEL, COMPREHENSIVE:        Result                      Value             Ref Range           Sodium                      135 (L)           136 - 145 mm*       Potassium                   5.3 (H)           3.5 - 5.1 mm*       Chloride                    104               98 - 107 mmo*       CO2                         24                21 - 32 mmol*       Anion gap                   7                 7 - 16 mmol/L       Glucose                     189 (H)           65 - 100 mg/*       BUN                         16                8 - 23 MG/DL        Creatinine                  1.19 (H)          0.6 - 1.0 MG*       GFR est AA                  56 (L)            >60 ml/min/1*       GFR est non-AA              47 (L)            >60 ml/min/1*       Calcium                     8.9               8.3 - 10.4 M*       Bilirubin, total            0.4               0.2 - 1.1 MG*       ALT (SGPT)                  21                12 - 65 U/L         AST (SGOT)                  45 (H)            15 - 37 U/L         Alk.  phosphatase            87                50 - 136 U/L        Protein, total              7.7               6.3 - 8.2 g/*       Albumin                     3.4               3.2 - 4.6 g/*       Globulin                    4.3 (H)           2.3 - 3.5 g/*       A-G Ratio                   0.8 (L)           1.2 - 3.5      -LIPASE:        Result                      Value             Ref Range           Lipase                      179               73 - 393 U/L   -MAGNESIUM:        Result                      Value             Ref Range           Magnesium                   2.2               1.8 - 2.4 mg*  -D DIMER:        Result                      Value             Ref Range           D DIMER                     0.51              <0.56 ug/ml(*  -EKG, 12 LEAD, INITIAL:        Result                      Value             Ref Range           Ventricular Rate            75                BPM                 Atrial Rate                 75                BPM                 P-R Interval                166               ms                  QRS Duration                86                ms                  Q-T Interval                378               ms                  QTC Calculation (Bezet)     422               ms                  Calculated P Axis           63                degrees             Calculated R Axis           40                degrees             Calculated T Axis           70                degrees             Diagnosis                                                     Normal sinus rhythm with sinus arrhythmia   Normal ECG   When compared with ECG of 10-NOV-2017 07:28,   No significant change was found   Confirmed by JESSICA CAN (), Zo Blair ([id number]) on 1/10/2022 4:27:49 PM     )  Tests in the radiology section of CPT®: ordered and reviewed (XR CHEST PORT    Result Date: 1/10/2022  AP PORTABLE CHEST X-RAY 1/10/2022 8:11 PM HISTORY: chest pain/back pain/ htn  chest pain/back pain/ htn COMPARISON: October 13, 2010 FINDINGS:  The cardiac silhouette is prominent.  EKG leads are present. There is no lobar consolidation, pleural effusions or pulmonary edema.      No consolidation.    )           EKG    Date/Time: 1/10/2022 7:41 PM  Performed by: Dulce Ye MD  Authorized by: Dulce Ye MD     ECG reviewed by ED Physician in the absence of a cardiologist: yes    Interpretation:     Interpretation: normal    Rate:     ECG rate:  75    ECG rate assessment: normal    Rhythm:     Rhythm: sinus rhythm    Ectopy:     Ectopy: none    Conduction:     Conduction: normal    ST segments:     ST segments:  Normal  T waves:     T waves: normal

## 2022-01-11 NOTE — ED NOTES
I have reviewed discharge instructions with the patient. The patient verbalized understanding. Patient left ED via Discharge Method: wheelchair to Home with (daughter liliam    Opportunity for questions and clarification provided. Patient given 0 scripts. To continue your aftercare when you leave the hospital, you may receive an automated call from our care team to check in on how you are doing. This is a free service and part of our promise to provide the best care and service to meet your aftercare needs.  If you have questions, or wish to unsubscribe from this service please call [phone]. Thank you for Choosing our [facility] Emergency Department.

## 2022-01-11 NOTE — ED NOTES
Pt stated had neck pain yesterday relieved with heat and bio freeze. Today she developed   Cp while trying to lay down . Family called Ems. Awake aert ox4 daughter ,liliam at bedside.  Mask on

## 2022-01-18 PROBLEM — E11.22 TYPE 2 DIABETES MELLITUS WITH CHRONIC KIDNEY DISEASE (HCC): Status: ACTIVE | Noted: 2022-01-18

## 2022-01-26 ENCOUNTER — HOSPITAL ENCOUNTER (OUTPATIENT)
Dept: LAB | Age: 82
Discharge: HOME | End: 2022-01-26
Payer: MEDICARE

## 2022-01-26 LAB
ALBUMIN SERPL-MCNC: 3.7 G/DL (ref 3.2–4.6)
ALBUMIN/GLOB SERPL: 1 {RATIO} (ref 1.2–3.5)
ALP SERPL-CCNC: 77 U/L (ref 50–136)
ALT SERPL-CCNC: 20 U/L (ref 12–65)
ANION GAP SERPL CALC-SCNC: 5 MMOL/L (ref 7–16)
AST SERPL-CCNC: 20 U/L (ref 15–37)
BASOPHILS # BLD: 0.1 K/UL (ref 0–0.2)
BASOPHILS NFR BLD: 1 % (ref 0–2)
BILIRUB SERPL-MCNC: 0.5 MG/DL (ref 0.2–1.1)
BUN SERPL-MCNC: 17 MG/DL (ref 8–23)
CALCIUM SERPL-MCNC: 9.8 MG/DL (ref 8.3–10.4)
CHLORIDE SERPL-SCNC: 106 MMOL/L (ref 98–107)
CHOLEST SERPL-MCNC: 140 MG/DL
CO2 SERPL-SCNC: 28 MMOL/L (ref 21–32)
CREAT SERPL-MCNC: 1.12 MG/DL (ref 0.6–1)
DIFFERENTIAL METHOD BLD: ABNORMAL
EOSINOPHIL # BLD: 0.3 K/UL (ref 0–0.8)
EOSINOPHIL NFR BLD: 5 % (ref 0.5–7.8)
ERYTHROCYTE [DISTWIDTH] IN BLOOD BY AUTOMATED COUNT: 12.7 % (ref 11.9–14.6)
EST. AVERAGE GLUCOSE BLD GHB EST-MCNC: 146 MG/DL
GLOBULIN SER CALC-MCNC: 3.7 G/DL (ref 2.3–3.5)
GLUCOSE SERPL-MCNC: 104 MG/DL (ref 65–100)
HBA1C MFR BLD: 6.7 % (ref 4.2–6.3)
HCT VFR BLD AUTO: 37.7 % (ref 35.8–46.3)
HDLC SERPL-MCNC: 70 MG/DL (ref 40–60)
HDLC SERPL: 2 {RATIO}
HGB BLD-MCNC: 12.1 G/DL (ref 11.7–15.4)
IMM GRANULOCYTES # BLD AUTO: 0 K/UL (ref 0–0.5)
IMM GRANULOCYTES NFR BLD AUTO: 0 % (ref 0–5)
LDLC SERPL CALC-MCNC: 49.6 MG/DL
LYMPHOCYTES # BLD: 2 K/UL (ref 0.5–4.6)
LYMPHOCYTES NFR BLD: 35 % (ref 13–44)
MCH RBC QN AUTO: 31.5 PG (ref 26.1–32.9)
MCHC RBC AUTO-ENTMCNC: 32.1 G/DL (ref 31.4–35)
MCV RBC AUTO: 98.2 FL (ref 79.6–97.8)
MONOCYTES # BLD: 0.4 K/UL (ref 0.1–1.3)
MONOCYTES NFR BLD: 6 % (ref 4–12)
NEUTS SEG # BLD: 2.9 K/UL (ref 1.7–8.2)
NEUTS SEG NFR BLD: 52 % (ref 43–78)
NRBC # BLD: 0 K/UL (ref 0–0.2)
PLATELET # BLD AUTO: 204 K/UL (ref 150–450)
PMV BLD AUTO: 12.7 FL (ref 9.4–12.3)
POTASSIUM SERPL-SCNC: 5 MMOL/L (ref 3.5–5.1)
PROT SERPL-MCNC: 7.4 G/DL (ref 6.3–8.2)
RBC # BLD AUTO: 3.84 M/UL (ref 4.05–5.2)
SODIUM SERPL-SCNC: 139 MMOL/L (ref 136–145)
T4 FREE SERPL-MCNC: 1 NG/DL (ref 0.78–1.46)
TRIGL SERPL-MCNC: 102 MG/DL (ref 35–150)
TSH SERPL DL<=0.005 MIU/L-ACNC: 2.21 UIU/ML (ref 0.36–3.74)
VLDLC SERPL CALC-MCNC: 20.4 MG/DL (ref 6–23)
WBC # BLD AUTO: 5.6 K/UL (ref 4.3–11.1)

## 2022-01-26 PROCEDURE — 84439 ASSAY OF FREE THYROXINE: CPT

## 2022-01-26 PROCEDURE — 83036 HEMOGLOBIN GLYCOSYLATED A1C: CPT

## 2022-01-26 PROCEDURE — 80061 LIPID PANEL: CPT

## 2022-01-26 PROCEDURE — 80053 COMPREHEN METABOLIC PANEL: CPT

## 2022-01-26 PROCEDURE — 36415 COLL VENOUS BLD VENIPUNCTURE: CPT

## 2022-01-26 PROCEDURE — 84443 ASSAY THYROID STIM HORMONE: CPT

## 2022-01-26 PROCEDURE — 85025 COMPLETE CBC W/AUTO DIFF WBC: CPT

## 2022-02-03 NOTE — PROGRESS NOTES
Results to patient please. A1c was 6.7. Has she been cheating on her diet? Thyroid is normal.  Creatinine is slightly elevated. Please avoid NSAIDs and remain hydrated. Liver enzymes are normal.  Total cholesterol is 140, triglycerides 102, HDL 70, and LDL is 49.6. Hemoglobin is 12. 1.

## 2022-03-18 PROBLEM — E11.22 TYPE 2 DIABETES MELLITUS WITH CHRONIC KIDNEY DISEASE (HCC): Status: ACTIVE | Noted: 2022-01-18

## 2022-03-18 PROBLEM — F09 COGNITIVE DISORDER: Status: ACTIVE | Noted: 2021-03-11

## 2022-03-18 PROBLEM — D75.89 MACROCYTOSIS: Status: ACTIVE | Noted: 2021-05-17

## 2022-03-18 PROBLEM — E11.21 TYPE 2 DIABETES WITH NEPHROPATHY (HCC): Status: ACTIVE | Noted: 2018-10-17

## 2022-03-18 PROBLEM — M25.561 ACUTE PAIN OF RIGHT KNEE: Status: ACTIVE | Noted: 2017-10-25

## 2022-03-19 PROBLEM — I25.10 CORONARY ARTERY DISEASE INVOLVING NATIVE CORONARY ARTERY OF NATIVE HEART WITHOUT ANGINA PECTORIS: Status: ACTIVE | Noted: 2021-09-10

## 2022-03-19 PROBLEM — D64.9 ANEMIA: Status: ACTIVE | Noted: 2021-05-17

## 2022-03-19 PROBLEM — M79.661 RIGHT CALF PAIN: Status: ACTIVE | Noted: 2017-10-25

## 2022-07-26 ENCOUNTER — OFFICE VISIT (OUTPATIENT)
Dept: FAMILY MEDICINE CLINIC | Facility: CLINIC | Age: 82
End: 2022-07-26
Payer: MEDICARE

## 2022-07-26 VITALS
TEMPERATURE: 98.3 F | BODY MASS INDEX: 26.62 KG/M2 | SYSTOLIC BLOOD PRESSURE: 126 MMHG | WEIGHT: 169.6 LBS | HEIGHT: 67 IN | OXYGEN SATURATION: 96 % | HEART RATE: 42 BPM | DIASTOLIC BLOOD PRESSURE: 50 MMHG

## 2022-07-26 DIAGNOSIS — R73.09 ELEVATED GLUCOSE: ICD-10-CM

## 2022-07-26 DIAGNOSIS — E78.2 MIXED HYPERLIPIDEMIA: Primary | ICD-10-CM

## 2022-07-26 DIAGNOSIS — D75.89 MACROCYTIC: Primary | ICD-10-CM

## 2022-07-26 DIAGNOSIS — N18.31 STAGE 3A CHRONIC KIDNEY DISEASE (HCC): ICD-10-CM

## 2022-07-26 PROBLEM — N18.30 CHRONIC RENAL DISEASE, STAGE III (HCC): Status: ACTIVE | Noted: 2022-07-26

## 2022-07-26 LAB
ALBUMIN SERPL-MCNC: 3.8 G/DL (ref 3.2–4.6)
ALBUMIN/GLOB SERPL: 1.1 {RATIO} (ref 1.2–3.5)
ALP SERPL-CCNC: 75 U/L (ref 50–136)
ALT SERPL-CCNC: 26 U/L (ref 12–65)
ANION GAP SERPL CALC-SCNC: 8 MMOL/L (ref 7–16)
AST SERPL-CCNC: 20 U/L (ref 15–37)
BASOPHILS # BLD: 0.1 K/UL (ref 0–0.2)
BASOPHILS NFR BLD: 2 % (ref 0–2)
BILIRUB SERPL-MCNC: 0.4 MG/DL (ref 0.2–1.1)
BUN SERPL-MCNC: 19 MG/DL (ref 8–23)
CALCIUM SERPL-MCNC: 9.1 MG/DL (ref 8.3–10.4)
CHLORIDE SERPL-SCNC: 106 MMOL/L (ref 98–107)
CHOLEST SERPL-MCNC: 161 MG/DL
CK SERPL-CCNC: 65 U/L (ref 21–215)
CO2 SERPL-SCNC: 26 MMOL/L (ref 21–32)
CREAT SERPL-MCNC: 1.3 MG/DL (ref 0.6–1)
DIFFERENTIAL METHOD BLD: ABNORMAL
EOSINOPHIL # BLD: 0.4 K/UL (ref 0–0.8)
EOSINOPHIL NFR BLD: 6 % (ref 0.5–7.8)
ERYTHROCYTE [DISTWIDTH] IN BLOOD BY AUTOMATED COUNT: 13 % (ref 11.9–14.6)
GLOBULIN SER CALC-MCNC: 3.6 G/DL (ref 2.3–3.5)
GLUCOSE SERPL-MCNC: 115 MG/DL (ref 65–100)
HBA1C MFR BLD: 6.2 %
HCT VFR BLD AUTO: 41 % (ref 35.8–46.3)
HDLC SERPL-MCNC: 65 MG/DL (ref 40–60)
HDLC SERPL: 2.5 {RATIO}
HGB BLD-MCNC: 12.6 G/DL (ref 11.7–15.4)
IMM GRANULOCYTES # BLD AUTO: 0 K/UL (ref 0–0.5)
IMM GRANULOCYTES NFR BLD AUTO: 0 % (ref 0–5)
LDLC SERPL CALC-MCNC: 63 MG/DL
LYMPHOCYTES # BLD: 2.5 K/UL (ref 0.5–4.6)
LYMPHOCYTES NFR BLD: 40 % (ref 13–44)
MCH RBC QN AUTO: 32 PG (ref 26.1–32.9)
MCHC RBC AUTO-ENTMCNC: 30.7 G/DL (ref 31.4–35)
MCV RBC AUTO: 104.1 FL (ref 79.6–97.8)
MONOCYTES # BLD: 0.5 K/UL (ref 0.1–1.3)
MONOCYTES NFR BLD: 8 % (ref 4–12)
NEUTS SEG # BLD: 2.8 K/UL (ref 1.7–8.2)
NEUTS SEG NFR BLD: 44 % (ref 43–78)
NRBC # BLD: 0 K/UL (ref 0–0.2)
PLATELET # BLD AUTO: 177 K/UL (ref 150–450)
PMV BLD AUTO: 12.4 FL (ref 9.4–12.3)
POTASSIUM SERPL-SCNC: 4.7 MMOL/L (ref 3.5–5.1)
PROT SERPL-MCNC: 7.4 G/DL (ref 6.3–8.2)
RBC # BLD AUTO: 3.94 M/UL (ref 4.05–5.2)
SODIUM SERPL-SCNC: 140 MMOL/L (ref 136–145)
TRIGL SERPL-MCNC: 165 MG/DL (ref 35–150)
VLDLC SERPL CALC-MCNC: 33 MG/DL (ref 6–23)
WBC # BLD AUTO: 6.2 K/UL (ref 4.3–11.1)

## 2022-07-26 PROCEDURE — 1123F ACP DISCUSS/DSCN MKR DOCD: CPT | Performed by: NURSE PRACTITIONER

## 2022-07-26 PROCEDURE — 83036 HEMOGLOBIN GLYCOSYLATED A1C: CPT | Performed by: NURSE PRACTITIONER

## 2022-07-26 PROCEDURE — 1090F PRES/ABSN URINE INCON ASSESS: CPT | Performed by: NURSE PRACTITIONER

## 2022-07-26 PROCEDURE — G8399 PT W/DXA RESULTS DOCUMENT: HCPCS | Performed by: NURSE PRACTITIONER

## 2022-07-26 PROCEDURE — G8427 DOCREV CUR MEDS BY ELIG CLIN: HCPCS | Performed by: NURSE PRACTITIONER

## 2022-07-26 PROCEDURE — 1036F TOBACCO NON-USER: CPT | Performed by: NURSE PRACTITIONER

## 2022-07-26 PROCEDURE — 99214 OFFICE O/P EST MOD 30 MIN: CPT | Performed by: NURSE PRACTITIONER

## 2022-07-26 PROCEDURE — G8417 CALC BMI ABV UP PARAM F/U: HCPCS | Performed by: NURSE PRACTITIONER

## 2022-07-26 RX ORDER — SIMVASTATIN 20 MG
20 TABLET ORAL NIGHTLY
Qty: 90 TABLET | Refills: 1 | Status: SHIPPED | OUTPATIENT
Start: 2022-07-26 | End: 2022-10-25 | Stop reason: SDUPTHER

## 2022-07-26 RX ORDER — PHENOL 1.4 %
1 AEROSOL, SPRAY (ML) MUCOUS MEMBRANE DAILY
COMMUNITY

## 2022-07-26 RX ORDER — FLUOXETINE 20 MG/1
20 TABLET ORAL DAILY
Qty: 90 TABLET | Refills: 1 | Status: SHIPPED | OUTPATIENT
Start: 2022-07-26 | End: 2022-10-25 | Stop reason: SDUPTHER

## 2022-07-26 SDOH — ECONOMIC STABILITY: FOOD INSECURITY: WITHIN THE PAST 12 MONTHS, THE FOOD YOU BOUGHT JUST DIDN'T LAST AND YOU DIDN'T HAVE MONEY TO GET MORE.: NEVER TRUE

## 2022-07-26 SDOH — ECONOMIC STABILITY: FOOD INSECURITY: WITHIN THE PAST 12 MONTHS, YOU WORRIED THAT YOUR FOOD WOULD RUN OUT BEFORE YOU GOT MONEY TO BUY MORE.: NEVER TRUE

## 2022-07-26 SDOH — ECONOMIC STABILITY: TRANSPORTATION INSECURITY
IN THE PAST 12 MONTHS, HAS LACK OF TRANSPORTATION KEPT YOU FROM MEETINGS, WORK, OR FROM GETTING THINGS NEEDED FOR DAILY LIVING?: NO

## 2022-07-26 SDOH — ECONOMIC STABILITY: TRANSPORTATION INSECURITY
IN THE PAST 12 MONTHS, HAS THE LACK OF TRANSPORTATION KEPT YOU FROM MEDICAL APPOINTMENTS OR FROM GETTING MEDICATIONS?: NO

## 2022-07-26 ASSESSMENT — PATIENT HEALTH QUESTIONNAIRE - PHQ9
SUM OF ALL RESPONSES TO PHQ9 QUESTIONS 1 & 2: 1
2. FEELING DOWN, DEPRESSED OR HOPELESS: 1
SUM OF ALL RESPONSES TO PHQ QUESTIONS 1-9: 1
1. LITTLE INTEREST OR PLEASURE IN DOING THINGS: 0
SUM OF ALL RESPONSES TO PHQ QUESTIONS 1-9: 1

## 2022-07-26 ASSESSMENT — ENCOUNTER SYMPTOMS
GASTROINTESTINAL NEGATIVE: 1
RESPIRATORY NEGATIVE: 1
ALLERGIC/IMMUNOLOGIC NEGATIVE: 1
EYES NEGATIVE: 1

## 2022-07-26 ASSESSMENT — SOCIAL DETERMINANTS OF HEALTH (SDOH): HOW HARD IS IT FOR YOU TO PAY FOR THE VERY BASICS LIKE FOOD, HOUSING, MEDICAL CARE, AND HEATING?: NOT VERY HARD

## 2022-07-26 ASSESSMENT — LIFESTYLE VARIABLES: HOW OFTEN DO YOU HAVE A DRINK CONTAINING ALCOHOL: NEVER

## 2022-07-26 NOTE — PROGRESS NOTES
[address]  [address] [facility] Claire Carrero, [address] [facility]   (ph) [phone] (fax) [phone]  Mary BACA, FNP-C      Chief Complaint   Patient presents with    Follow-up     3 month fu        [de-identified] yo female comes in as a follow-up to chronic conditions. She refuses mammograms and reports her oncologist said that she does not need anymore mammograms due to breast implants. She is still having back pain but ortho told her that it is arthritis  and to continue Celebrex. She stopped the Celebrex as well . She reports feeling better than she has in years. Pt  stopped her  Metformin. Her Cr+ was slightly elevated and she reports that sugars have been good. She saw nephrology in April and returns in 10/22. She still sees Dr. Dana Fernando for the anemia. She has not had follow-up regarding her MRI ordered by Dr. Adam Franks last year. She refuses to go back and see him and reports feeling well.           Allergies   Allergen Reactions    Atorvastatin Other (See Comments)    Codeine Hives    Gabapentin Hives and Itching    Lisinopril Other (See Comments)     Ace cough    Pravastatin Other (See Comments)    Sulfa Antibiotics Other (See Comments)     Pt takes Celebrex without problems    Sulfur Hives    Esomeprazole Rash     All over rash and itching     Rosuvastatin Rash       Past Medical History:   Diagnosis Date    Abnormal EKG     ACE inhibitor intolerance 2014    cough with lisinopril    Allergic rhinitis 11/10/2014    Allergic rhinitis, cause unspecified     Arthritis     Asthma 11/10/2014    Backache, unspecified     CAD (coronary artery disease) 2/4/2016    Cancer ([facility] [address]     breast    Continuous leakage(788.37)     Depressive disorder, not elsewhere classified     Diverticulitis large intestine     Diverticulitis large intestine w/o perforation or abscess w/o bleeding 10/26/2015    Diverticulitis of colon (without mention of hemorrhage)(562.11)     Dyslipidemia Dyspnea     Shortness of breath    Encounter for long-term (current) use of aspirin     Esophageal dysphagia 10/26/2015    Esophageal reflux     Essential hypertension     External hemorrhoids without mention of complication     Gastritis due to nonsteroidal anti-inflammatory drug (NSAID) 6/26/2015    Generalized anxiety disorder     GERD (gastroesophageal reflux disease)     Heart disease, unspecified     HLD (hyperlipidemia)     HTN (hypertension), malignant 2/4/2016    Hx of bilateral mastectomy     Hx of hysterectomy     Hypertension 6/26/2015    Hypertonicity of bladder     Insomnia, unspecified     Lower abdominal pain 10/26/2015    LVH (left ventricular hypertrophy)     L or R Cardiomegaly    Major depressive disorder, recurrent episode, mild (HCC)     Mixed hyperlipidemia     Normal cardiac stress test 2/2012    normal stress echo with Ef 60-65% with Dr Mcleod Favor PTCA 2/4/2016    Postmenopausal atrophic vaginitis     Precordial chest pain     Reflux 10/26/2015    Sleep apnea     does not sleep with cpap    Symptomatic menopausal or female climacteric states     Thromboembolus [facility]) 1970's    legs    Type II diabetes mellitus, well controlled ([facility] [address] 6/26/2015    Type II or unspecified type diabetes mellitus with ophthalmic manifestations, not stated as uncontrolled(250.50)     Type II or unspecified type diabetes mellitus without mention of complication, uncontrolled     Urinary tract infection, site not specified        Family History   Problem Relation Age of Onset    Coronary Art Dis Brother     Other Brother         SCD    Other Sister         PCI's    Heart Disease Other         Ischemic Heart Disease    Diabetes Son         NON-INSULIN DEPENDENT    Heart Disease Other         2 MIs    Lung Disease Brother     Kidney Disease Brother     Cancer Sister         BREAST    Hypertension Other     Other Other         No family history of pulmonary concerns    Deep Vein Thrombosis Mother     Coronary Art Dis Father 48       Social History     Socioeconomic History    Marital status:      Spouse name: Not on file    Number of children: Not on file    Years of education: Not on file    Highest education level: Not on file   Occupational History    Not on file   Tobacco Use    Smoking status: Never    Smokeless tobacco: Never    Tobacco comments:     Quit smoking: Secondhand smoke exposure for 16 years from her  cigarettes   Substance and Sexual Activity    Alcohol use: Never    Drug use: No    Sexual activity: Not on file   Other Topics Concern    Not on file   Social History Narrative    Lifelong nonsmoker with 16 year secondhand smoke exposure history. Worked as a  at [employer], denies industrial toxin exposure history. , 3 children who are healthy. Denies alcohol use. Has always lived in [facility] [facility]. No pets, no history of pet bird. Social Determinants of Health     Financial Resource Strain: Low Risk     Difficulty of Paying Living Expenses: Not very hard   Food Insecurity: No Food Insecurity    Worried About Running Out of Food in the Last Year: Never true    Ran Out of Food in the Last Year: Never true   Transportation Needs: No Transportation Needs    Lack of Transportation (Medical): No    Lack of Transportation (Non-Medical): No   Physical Activity: Not on file   Stress: Not on file   Social Connections: Not on file   Intimate Partner Violence: Not on file   Housing Stability: Not on file       OB History    No obstetric history on file.          Past Surgical History:   Procedure Laterality Date    APPENDECTOMY      BACK SURGERY      BLADDER SUSPENSION      CHOLECYSTECTOMY      COLONOSCOPY  10/26/2011    COLONOSCOPY  2017    wnl    COLONOSCOPY      HYSTERECTOMY (CERVIX STATUS UNKNOWN)      MASTECTOMY Bilateral 1989    Bilateral, breast cancer, no chemotherapy, no radiation    MASTECTOMY, RADICAL Bilateral     ORTHOPEDIC SURGERY  1990s    Fusion of disc x 2 [place] CARDIAC SURG PROCEDURE UNLIST  2017    heart cath -- no additional stents    [place] CARDIAC SURG PROCEDURE UNLIST  2016    1 stent placed    TOTAL ABDOM HYSTERECTOMY      TOTAL COLECTOMY         Health Maintenance   Topic Date Due    DTaP/Tdap/Td vaccine (1 - Tdap) 11/03/2018    COVID-19 Vaccine (3 - Booster for Moderna series) 10/24/2021    Flu vaccine (1) 09/01/2022    Annual Wellness Visit (AWV)  10/27/2022    Lipids  04/26/2023    Depression Monitoring  04/26/2023    DEXA (modify frequency per FRAX score)  Completed    Shingles vaccine  Completed    Pneumococcal 65+ years Vaccine  Completed    Hepatitis A vaccine  Aged Out    Hib vaccine  Aged Out    Meningococcal (ACWY) vaccine  Aged Out         Current Outpatient Medications:     Multiple Vitamins-Minerals (MULTIVITAMIN ADULTS 50+) TABS, Take 1 tablet by mouth daily, Disp: , Rfl:     albuterol sulfate  (90 Base) MCG/ACT inhaler, Inhale 1 puff into the lungs every 6 hours as needed, Disp: , Rfl:     aspirin 81 MG EC tablet, Take by mouth daily, Disp: , Rfl:     FLUoxetine HCl, PMDD, 20 MG TABS, Take 20 mg by mouth daily, Disp: , Rfl:     simvastatin (ZOCOR) 20 MG tablet, Take 20 mg by mouth, Disp: , Rfl:     Review of Systems   Constitutional: Negative. HENT: Negative. Eyes: Negative. Respiratory: Negative. Cardiovascular: Negative. Gastrointestinal: Negative. Endocrine: Negative. Genitourinary: Negative. Musculoskeletal: Negative. Skin: Negative. Allergic/Immunologic: Negative. Neurological: Negative. Hematological: Negative. Psychiatric/Behavioral: Negative. Vitals:    07/26/22 0802   BP: (!) 141/51   Pulse: 60   Temp: 98.3 °F (36.8 °C)   SpO2: 97%        Physical Exam  Constitutional:       Appearance: Normal appearance. HENT:      Head: Normocephalic. Nose: Nose normal.   Eyes:      Extraocular Movements: Extraocular movements intact. Pupils: Pupils are equal, round, and reactive to light. Cardiovascular:      Rate and Rhythm: Normal rate and regular rhythm. Pulmonary:      Effort: Pulmonary effort is normal.      Breath sounds: Normal breath sounds. Abdominal:      General: Abdomen is flat. Palpations: Abdomen is soft. Musculoskeletal:         General: Normal range of motion. Cervical back: Normal range of motion and neck supple. Skin:     General: Skin is warm and dry. Neurological:      General: No focal deficit present. Mental Status: She is alert and oriented to person, place, and time. Psychiatric:         Mood and Affect: Mood normal.         Behavior: Behavior normal.          Assessment & Plan:  1. Mixed hyperlipidemia  stable  - Comprehensive Metabolic Panel; Future  - Lipid Panel; Future  - CBC with Auto Differential; Future  - CK; Future  - CK  - CBC with Auto Differential  - Lipid Panel  - Comprehensive Metabolic Panel    2. Elevated glucose  stable  - AMB POC HEMOGLOBIN A1C    3. Stage 3a chronic kidney disease (HCC)  Stable; avoiding NSAIDS, remaining hydrated  Under care of nephrology        Greater than 50% counseling and/or coordination of care: the treatment regimen is extensive; detailed review. Will notify of labs. Recheck in 4-6 months. This note was dictated using dragon voice recognition software. It has been proofread, but there may still exist voice recognition errors that the author did not detect.       Signed By: KEVEN Pardo - DONIS     July 26, 2022

## 2022-08-01 LAB
FOLATE SERPL-MCNC: 30.8 NG/ML (ref 3.1–17.5)
VIT B12 SERPL-MCNC: 429 PG/ML (ref 193–986)

## 2022-08-03 ENCOUNTER — OFFICE VISIT (OUTPATIENT)
Dept: CARDIOLOGY CLINIC | Age: 82
End: 2022-08-03
Payer: MEDICARE

## 2022-08-03 VITALS
HEIGHT: 67 IN | DIASTOLIC BLOOD PRESSURE: 72 MMHG | WEIGHT: 170 LBS | SYSTOLIC BLOOD PRESSURE: 140 MMHG | BODY MASS INDEX: 26.68 KG/M2 | HEART RATE: 68 BPM

## 2022-08-03 DIAGNOSIS — I25.10 CORONARY ARTERY DISEASE INVOLVING NATIVE CORONARY ARTERY OF NATIVE HEART WITHOUT ANGINA PECTORIS: Primary | ICD-10-CM

## 2022-08-03 DIAGNOSIS — R06.09 DYSPNEA ON EXERTION: ICD-10-CM

## 2022-08-03 DIAGNOSIS — E78.2 MIXED HYPERLIPIDEMIA: ICD-10-CM

## 2022-08-03 DIAGNOSIS — I65.23 BILATERAL CAROTID ARTERY STENOSIS: ICD-10-CM

## 2022-08-03 PROBLEM — I20.0 ACCELERATING ANGINA (HCC): Status: ACTIVE | Noted: 2022-08-03

## 2022-08-03 PROBLEM — I25.119 CORONARY ARTERY DISEASE INVOLVING NATIVE CORONARY ARTERY OF NATIVE HEART WITH ANGINA PECTORIS (HCC): Status: ACTIVE | Noted: 2021-09-10

## 2022-08-03 PROCEDURE — 1123F ACP DISCUSS/DSCN MKR DOCD: CPT | Performed by: INTERNAL MEDICINE

## 2022-08-03 PROCEDURE — 1036F TOBACCO NON-USER: CPT | Performed by: INTERNAL MEDICINE

## 2022-08-03 PROCEDURE — G8417 CALC BMI ABV UP PARAM F/U: HCPCS | Performed by: INTERNAL MEDICINE

## 2022-08-03 PROCEDURE — G8428 CUR MEDS NOT DOCUMENT: HCPCS | Performed by: INTERNAL MEDICINE

## 2022-08-03 PROCEDURE — G8399 PT W/DXA RESULTS DOCUMENT: HCPCS | Performed by: INTERNAL MEDICINE

## 2022-08-03 PROCEDURE — 99215 OFFICE O/P EST HI 40 MIN: CPT | Performed by: INTERNAL MEDICINE

## 2022-08-03 PROCEDURE — 1090F PRES/ABSN URINE INCON ASSESS: CPT | Performed by: INTERNAL MEDICINE

## 2022-08-03 NOTE — PROGRESS NOTES
[address], [address] [employer] [facility], [address] [place] [address]  PHONE: [phone]     22    NAME:  David Ng  : 1940  MRN: [id number]       SUBJECTIVE:   David Ng is a [de-identified] y.o. female seen for a follow up visit regarding the following:     Chief Complaint   Patient presents with    Coronary Artery Disease       HPI: Here today for follow up for CAD   PCI to [facility], echo showed normal EF  Carotid US:  3/16 and 2018: mild carotid Dz  C 11/10/17: mild CAD, EF 60%. NST 10/2021:  Low Risk Scan. LOAIZA worsening now, angina worsening. LOAIZA walking to [employer]. No CP. No edema. Patient denies recent history of orthopnea, PND, excessive dizziness and/or syncope. She has GLEN, cannot wear mask. Did not tolerate lipitor, crestor. Past Medical History, Past Surgical History, Family history, Social History, and Medications were all reviewed with the patient today and updated as necessary. Current Outpatient Medications   Medication Sig Dispense Refill    Multiple Vitamins-Minerals (MULTIVITAMIN ADULTS 50+) TABS Take 1 tablet by mouth daily      FLUoxetine HCl, PMDD, 20 MG TABS Take 20 mg by mouth daily 90 tablet 1    simvastatin (ZOCOR) 20 MG tablet Take 1 tablet by mouth nightly 90 tablet 1    albuterol sulfate  (90 Base) MCG/ACT inhaler Inhale 1 puff into the lungs every 6 hours as needed      aspirin 81 MG EC tablet Take by mouth daily       No current facility-administered medications for this visit.         Allergies   Allergen Reactions    Atorvastatin Other (See Comments)    Codeine Hives    Gabapentin Hives and Itching    Lisinopril Other (See Comments)     Ace cough    Pravastatin Other (See Comments)    Sulfa Antibiotics Other (See Comments)     Pt takes Celebrex without problems    Sulfur Hives    Esomeprazole Rash     All over rash and itching     Rosuvastatin Rash     Patient Active Problem List    Diagnosis Date Noted    Chronic renal disease, stage III [facility]) [413106] 07/26/2022     Priority: Medium    Type 2 diabetes mellitus with chronic kidney disease ([address] 01/18/2022    Coronary artery disease involving native coronary artery of native heart with angina pectoris ([address] 09/10/2021    Macrocytosis 05/17/2021    Anemia 05/17/2021    Cognitive disorder 03/11/2021    Type 2 diabetes with nephropathy ([address] 10/17/2018    Acute pain of right knee 10/25/2017    Right calf pain 10/25/2017    Primary osteoarthritis of both knees 12/21/2016    Carotid artery disease ([address] 03/24/2016    Esophageal reflux     Major depressive disorder, recurrent episode, mild (HCC)     Dyspnea      Shortness of breath        Precordial chest pain     Coronary artery disease involving native coronary artery without angina pectoris 02/04/2016     PCI 2/16: Diag PCI with ZOIE        Post PTCA 02/04/2016    Diverticulitis large intestine     Diverticulitis large intestine w/o perforation or abscess w/o bleeding 10/26/2015    Lower abdominal pain 10/26/2015    Esophageal dysphagia 10/26/2015    ACE inhibitor intolerance      cough with lisinopril        Type II diabetes mellitus, well controlled ([address] 06/26/2015    Gastritis due to nonsteroidal anti-inflammatory drug (NSAID) 06/26/2015    Hx of bilateral mastectomy     Postmenopausal atrophic vaginitis     Symptomatic menopausal or female climacteric states     Hypertonicity of bladder     Mixed hyperlipidemia     Insomnia, unspecified     Arthritis     Generalized anxiety disorder     Allergic rhinitis 11/10/2014    Asthma 11/10/2014      Past Surgical History:   Procedure Laterality Date    APPENDECTOMY      BACK SURGERY      BLADDER SUSPENSION      CHOLECYSTECTOMY      COLONOSCOPY  10/26/2011    COLONOSCOPY  2017    wnl    COLONOSCOPY      HYSTERECTOMY (CERVIX STATUS UNKNOWN)      MASTECTOMY Bilateral 1989    Bilateral, breast cancer, no chemotherapy, no radiation    MASTECTOMY, RADICAL Bilateral     ORTHOPEDIC SURGERY  1990s    Fusion of disc x 2    [place] CARDIAC SURG PROCEDURE UNLIST  2017    heart cath -- no additional stents    [place] CARDIAC SURG PROCEDURE UNLIST  2016    1 stent placed    TOTAL ABDOM HYSTERECTOMY      TOTAL COLECTOMY       Family History   Problem Relation Age of Onset    Coronary Art Dis Brother     Other Brother         SCD    Other Sister         PCI's    Heart Disease Other         Ischemic Heart Disease    Diabetes Son         NON-INSULIN DEPENDENT    Heart Disease Other         2 MIs    Lung Disease Brother     Kidney Disease Brother     Cancer Sister         BREAST    Hypertension Other     Other Other         No family history of pulmonary concerns    Deep Vein Thrombosis Mother     Coronary Art Dis Father 48     Social History     Tobacco Use    Smoking status: Never    Smokeless tobacco: Never    Tobacco comments:     Quit smoking: Secondhand smoke exposure for 16 years from her  cigarettes   Substance Use Topics    Alcohol use: Never         ROS:  Constitutional:   Negative for fevers and unexplained weight loss. Eyes:   Negative for visual disturbance. ENT:   Negative for significant hearing loss and tinnitus. Respiratory:   Negative for hemoptysis. Cardiovascular:   Negative except as noted in HPI. Gastrointestinal:   Negative for melena and abdominal pain. Genitourinary:   Negative for hematuria, renal stones. Integumentary:   Negative for rash or non-healing wounds  Hematologic/Lymphatic:   Negative for excessive bleeding hx or clotting disorder. Musculoskeletal:  Negative for active, unexplained/severe joint pain. Neurological:   Negative for stroke. Behavioral/Psych:   Negative for suicidal ideations. Endocrine:   Negative for uncontrolled diabetic symptoms including polyuria, polydipsia and poor wound healing.          PHYSICAL EXAM:     BP (!) 140/72   Pulse 68   Ht 5' 7\" (1.702 m)   Wt 170 lb (77.1 kg)   BMI 26.63 kg/m²    General/Constitutional:   Alert and oriented x 3, no acute distress  HEENT: normocephalic, atraumatic, moist mucous membranes  Neck:   No JVD or carotid bruits bilaterally  Cardiovascular:   regular rate and rhythm, no murmur/rub/gallop appreciated  Pulmonary:   clear to auscultation bilaterally, no respiratory distress  Abdomen:   soft, non-tender, non-distended  Ext:   No sig LE edema bilaterally  Skin:  warm and dry, no obvious rashes seen  Neuro:   no obvious sensory or motor deficits  Psychiatric:   normal mood and affect      Lab Results   Component Value Date/Time     07/26/2022 08:45 AM    K 4.7 07/26/2022 08:45 AM     07/26/2022 08:45 AM    CO2 26 07/26/2022 08:45 AM    BUN 19 07/26/2022 08:45 AM    CREATININE 1.30 07/26/2022 08:45 AM    GLUCOSE 115 07/26/2022 08:45 AM    CALCIUM 9.1 07/26/2022 08:45 AM        Lab Results   Component Value Date    WBC 6.2 07/26/2022    HGB 12.6 07/26/2022    HCT 41.0 07/26/2022    .1 (H) 07/26/2022     07/26/2022       Lab Results   Component Value Date    TSH 2.210 01/26/2022       Lab Results   Component Value Date    LABA1C 6.7 (H) 01/26/2022     Lab Results   Component Value Date     01/26/2022       Lab Results   Component Value Date    CHOL 161 07/26/2022    CHOL 214 (H) 04/26/2022    CHOL 140 01/26/2022     Lab Results   Component Value Date    TRIG 165 (H) 07/26/2022    TRIG 181 (H) 04/26/2022    TRIG 102 01/26/2022     Lab Results   Component Value Date    HDL 65 (H) 07/26/2022    HDL 56 04/26/2022    HDL 70 (H) 01/26/2022     Lab Results   Component Value Date    LDLCALC 63 07/26/2022    LDLCALC 126 (H) 04/26/2022    LDLCALC 49.6 01/26/2022     Lab Results   Component Value Date    LABVLDL 33 (H) 07/26/2022    LABVLDL 20.4 01/26/2022    LABVLDL 21 06/09/2020    VLDL 32 04/26/2022    VLDL 37 10/26/2021    VLDL 16 04/01/2021     Lab Results   Component Value Date    CHOLHDLRATIO 2.5 07/26/2022    CHOLHDLRATIO 2.0 01/26/2022           I have Independently reviewed prior care notes, any ER records available, cardiac testing, labs and results with the patient and before seeing the patient today. Also independently reviewed outside records when available. ASSESSMENT:    Miles Back was seen today for coronary artery disease. Diagnoses and all orders for this visit:    Coronary artery disease involving native coronary artery of native heart without angina pectoris  -     Case Request Cardiac Cath Lab    Bilateral carotid artery stenosis    Mixed hyperlipidemia    Dyspnea on exertion        PLAN:     1. CAD:    On ASA and zocor. More LOAIZA, more angina now, worsening. Plan on [facility]. Plan for Left Heart Catheterization and possible Percutaneous Coronary Intervention (PCI) at [facility] due to Worsening angina within the last 2 mos as described in HPI. Discussed risk of cardiac catheterization and potential PCI with the patient in detail. These risks include, but are not limited to, bleeding, stroke, heart attack, cardiac arrhythmias, allergic reactions, atheroemboli, acute kidney injury and cardiac arrest/death. Local complications at the site of catheter insertion were also reviewed and discussed. The patient voiced complete understanding about these risks. The patient agrees to proceed with the aforementioned associated risks. 2. Carotid Dz:  Remain on ASA and statin, no more testing. 3. HPL:  Reviewed repatha or other med options, she declined in the past.         Patient has been instructed and agrees to call our office with any issues or other concerns related to their cardiac condition(s) and/or complaint(s).         Return for Return After Test.       RUSTY MERCHANT DO  8/3/2022

## 2022-08-08 DIAGNOSIS — R71.8 BONE MARROW'S PRODUCTION OF RED BLOOD CELLS DECREASED: Primary | ICD-10-CM

## 2022-08-08 NOTE — PROGRESS NOTES
"S:  Here due to nausea and vomiting since birth.  She is taking Zofran and says that it is \"much better\" than it was the past few days.  Tearfully she says that looking at the baby makes her feel like throwing up. She feels guilty about this.   She has no thoughts of hurting herself or others.  She is able to eat and drink.  She has not vomited today.  She has a psychiatrist and has not talked to her since birth.   She making light yellow urine.    O:  Tearful   A/P  (R11.2) Nausea and vomiting, intractability of vomiting not specified, unspecified vomiting type  (primary encounter diagnosis)  Plan: Adult Mental Health  Referral       Zofran reordered   Encouraged staying hydrated and eating what she can.    IV fluids offered - she declined.  She knows can call us to schedule IV fluids or go to ED if she can't hold down food or fluids.      (F43.21) Adjustment disorder with depressed mood  Plan: Adult Mental Health  Referral - agrees to referral        She agrees to call her Psychiatrist today and says that she is having a hard time  She feels safe and well supported at home with mom and partner.  We discussed that she can go to ED if she has thoughts of hurting herself or others.  Agrees to mental health referral for therapy.  But she will call her psychiatrist.    Support and listening given.        (Z98.614) S/P primary low transverse   Plan: ondansetron (ZOFRAN ODT) 4 MG ODT tab   BP WNL today    She says that she has an appt already scheduled on Friday.  She agrees to return on Friday to recheck mood and nausea and vomitting.        " B12 quite low. Can't find a note to indicate that the patient has been treated. If not needs B12 1mg IM weekly for 4 doses then monthly for 5 additional doses. She can either get this at her PCP or at her pharmacy.

## 2022-08-11 NOTE — PROGRESS NOTES
Called to pre-assess for [employer] , Scheduled [facility]. No answer & message left. Patient instructed to be at the hospital for 0600.

## 2022-08-12 ENCOUNTER — HOSPITAL ENCOUNTER (OUTPATIENT)
Age: 82
Setting detail: OUTPATIENT SURGERY
Discharge: HOME | End: 2022-08-12
Attending: INTERNAL MEDICINE | Admitting: INTERNAL MEDICINE
Payer: MEDICARE

## 2022-08-12 VITALS
WEIGHT: 170 LBS | OXYGEN SATURATION: 95 % | RESPIRATION RATE: 16 BRPM | TEMPERATURE: 98.2 F | HEART RATE: 66 BPM | HEIGHT: 67 IN | BODY MASS INDEX: 26.68 KG/M2 | SYSTOLIC BLOOD PRESSURE: 145 MMHG | DIASTOLIC BLOOD PRESSURE: 79 MMHG

## 2022-08-12 DIAGNOSIS — I20.0 ACCELERATING ANGINA (HCC): ICD-10-CM

## 2022-08-12 LAB
ANION GAP SERPL CALC-SCNC: 1 MMOL/L (ref 7–16)
BUN SERPL-MCNC: 16 MG/DL (ref 8–23)
CALCIUM SERPL-MCNC: 9.3 MG/DL (ref 8.3–10.4)
CHLORIDE SERPL-SCNC: 107 MMOL/L (ref 98–107)
CO2 SERPL-SCNC: 30 MMOL/L (ref 21–32)
CREAT SERPL-MCNC: 1.3 MG/DL (ref 0.6–1)
ECHO BSA: 1.91 M2
EKG ATRIAL RATE: 66 BPM
EKG DIAGNOSIS: NORMAL
EKG P AXIS: 96 DEGREES
EKG P-R INTERVAL: 190 MS
EKG Q-T INTERVAL: 368 MS
EKG QRS DURATION: 84 MS
EKG QTC CALCULATION (BAZETT): 385 MS
EKG R AXIS: 63 DEGREES
EKG T AXIS: 74 DEGREES
EKG VENTRICULAR RATE: 66 BPM
GLUCOSE SERPL-MCNC: 105 MG/DL (ref 65–100)
POTASSIUM SERPL-SCNC: 5.2 MMOL/L (ref 3.5–5.1)
SODIUM SERPL-SCNC: 138 MMOL/L (ref 136–145)

## 2022-08-12 PROCEDURE — C1894 INTRO/SHEATH, NON-LASER: HCPCS | Performed by: INTERNAL MEDICINE

## 2022-08-12 PROCEDURE — 2580000003 HC RX 258: Performed by: INTERNAL MEDICINE

## 2022-08-12 PROCEDURE — 2500000003 HC RX 250 WO HCPCS: Performed by: INTERNAL MEDICINE

## 2022-08-12 PROCEDURE — 99152 MOD SED SAME PHYS/QHP 5/>YRS: CPT | Performed by: INTERNAL MEDICINE

## 2022-08-12 PROCEDURE — C1887 CATHETER, GUIDING: HCPCS | Performed by: INTERNAL MEDICINE

## 2022-08-12 PROCEDURE — 80048 BASIC METABOLIC PNL TOTAL CA: CPT

## 2022-08-12 PROCEDURE — C1769 GUIDE WIRE: HCPCS | Performed by: INTERNAL MEDICINE

## 2022-08-12 PROCEDURE — 93458 L HRT ARTERY/VENTRICLE ANGIO: CPT | Performed by: INTERNAL MEDICINE

## 2022-08-12 PROCEDURE — 6360000004 HC RX CONTRAST MEDICATION: Performed by: INTERNAL MEDICINE

## 2022-08-12 PROCEDURE — 2709999900 HC NON-CHARGEABLE SUPPLY: Performed by: INTERNAL MEDICINE

## 2022-08-12 PROCEDURE — 93005 ELECTROCARDIOGRAM TRACING: CPT | Performed by: INTERNAL MEDICINE

## 2022-08-12 PROCEDURE — 6360000002 HC RX W HCPCS: Performed by: INTERNAL MEDICINE

## 2022-08-12 RX ORDER — LIDOCAINE HYDROCHLORIDE 10 MG/ML
INJECTION, SOLUTION INFILTRATION; PERINEURAL PRN
Status: DISCONTINUED | OUTPATIENT
Start: 2022-08-12 | End: 2022-08-12 | Stop reason: HOSPADM

## 2022-08-12 RX ORDER — 0.9 % SODIUM CHLORIDE 0.9 %
250 INTRAVENOUS SOLUTION INTRAVENOUS ONCE
Status: COMPLETED | OUTPATIENT
Start: 2022-08-12 | End: 2022-08-12

## 2022-08-12 RX ORDER — NITROGLYCERIN 20 MG/100ML
INJECTION INTRAVENOUS CONTINUOUS PRN
Status: COMPLETED | OUTPATIENT
Start: 2022-08-12 | End: 2022-08-12

## 2022-08-12 RX ORDER — MIDAZOLAM HYDROCHLORIDE 1 MG/ML
INJECTION INTRAMUSCULAR; INTRAVENOUS PRN
Status: DISCONTINUED | OUTPATIENT
Start: 2022-08-12 | End: 2022-08-12 | Stop reason: HOSPADM

## 2022-08-12 RX ORDER — ASPIRIN 81 MG/1
324 TABLET, CHEWABLE ORAL ONCE
Status: DISCONTINUED | OUTPATIENT
Start: 2022-08-12 | End: 2022-08-12 | Stop reason: HOSPADM

## 2022-08-12 RX ORDER — HEPARIN SODIUM 200 [USP'U]/100ML
INJECTION, SOLUTION INTRAVENOUS CONTINUOUS PRN
Status: DISCONTINUED | OUTPATIENT
Start: 2022-08-12 | End: 2022-08-12 | Stop reason: HOSPADM

## 2022-08-12 RX ADMIN — SODIUM CHLORIDE 250 ML: 900 INJECTION, SOLUTION INTRAVENOUS at 07:28

## 2022-08-12 NOTE — H&P
Office Visit    8/3/2022  [facility] CARDIOLOGY  Katharina De La Garza DO    Internal Medicine Cardiovascular Disease Coronary artery disease involving native coronary artery of native heart without angina pectoris +3 more    Dx Coronary Artery Disease; Referred by Katharina De La Garza DO    Reason for Visit         Progress Notes  Katharina De L aGarza DO (Physician)   Internal Medicine Cardiovascular Disease  Expand All Collapse All          [address], [address], [address]  [employer], [address]  PHONE: [phone]                22     NAME:  Colby Echeverria  : 1940  MRN: [id number]         SUBJECTIVE:   Colby Echeverria is a [de-identified] y.o. female seen for a follow up visit regarding the following:         Chief Complaint   Patient presents with    Coronary Artery Disease         HPI: Here today for follow up for CAD   PCI to Diag, echo showed normal EF  Carotid US:  3/16 and 2018: mild carotid Dz  [facility] 11/10/17: mild CAD, EF 60%. NST 10/2021:  Low Risk Scan. LOAIZA worsening now, angina worsening. LOAIZA walking to [employer]. No CP. No edema. Patient denies recent history of orthopnea, PND, excessive dizziness and/or syncope. She has GLEN, cannot wear mask. Did not tolerate lipitor, crestor. Past Medical History, Past Surgical History, Family history, Social History, and Medications were all reviewed with the patient today and updated as necessary.       Current Facility-Administered Medications          Current Outpatient Medications   Medication Sig Dispense Refill    Multiple Vitamins-Minerals (MULTIVITAMIN ADULTS 50+) TABS Take 1 tablet by mouth daily        FLUoxetine HCl, PMDD, 20 MG TABS Take 20 mg by mouth daily 90 tablet 1    simvastatin (ZOCOR) 20 MG tablet Take 1 tablet by mouth nightly 90 tablet 1    albuterol sulfate  (90 Base) MCG/ACT inhaler Inhale 1 puff into the lungs every 6 hours as needed        aspirin 81 MG EC tablet Take by mouth daily          No current facility-administered medications for this visit.                   Allergies   Allergen Reactions    Atorvastatin Other (See Comments)    Codeine Hives    Gabapentin Hives and Itching    Lisinopril Other (See Comments)       Ace cough    Pravastatin Other (See Comments)    Sulfa Antibiotics Other (See Comments)       Pt takes Celebrex without problems    Sulfur Hives    Esomeprazole Rash       All over rash and itching    Rosuvastatin Rash            Patient Active Problem List     Diagnosis Date Noted    Chronic renal disease, stage III [facility]) [474898] 07/26/2022       Priority: Medium    Type 2 diabetes mellitus with chronic kidney disease ([address] 01/18/2022    Coronary artery disease involving native coronary artery of native heart with angina pectoris ([address] 09/10/2021    Macrocytosis 05/17/2021    Anemia 05/17/2021    Cognitive disorder 03/11/2021    Type 2 diabetes with nephropathy ([address] 10/17/2018    Acute pain of right knee 10/25/2017    Right calf pain 10/25/2017    Primary osteoarthritis of both knees 12/21/2016    Carotid artery disease ([address] 03/24/2016    Esophageal reflux      Major depressive disorder, recurrent episode, mild (HCC)      Dyspnea         Shortness of breath          Precordial chest pain      Coronary artery disease involving native coronary artery without angina pectoris 02/04/2016       PCI 2/16: Diag PCI with ZOIE          Post PTCA 02/04/2016    Diverticulitis large intestine      Diverticulitis large intestine w/o perforation or abscess w/o bleeding 10/26/2015    Lower abdominal pain 10/26/2015    Esophageal dysphagia 10/26/2015    ACE inhibitor intolerance         cough with lisinopril          Type II diabetes mellitus, well controlled ([address] 06/26/2015    Gastritis due to nonsteroidal anti-inflammatory drug (NSAID) 06/26/2015    Hx of bilateral mastectomy      Postmenopausal atrophic vaginitis      Symptomatic menopausal or female climacteric states      Hypertonicity of bladder Mixed hyperlipidemia      Insomnia, unspecified      Arthritis      Generalized anxiety disorder      Allergic rhinitis 11/10/2014    Asthma 11/10/2014      Past Surgical History         Past Surgical History:   Procedure Laterality Date    APPENDECTOMY        BACK SURGERY        BLADDER SUSPENSION        CHOLECYSTECTOMY        COLONOSCOPY   10/26/2011    COLONOSCOPY   2017     wnl    COLONOSCOPY        HYSTERECTOMY (CERVIX STATUS UNKNOWN)        MASTECTOMY Bilateral 1989     Bilateral, breast cancer, no chemotherapy, no radiation    MASTECTOMY, RADICAL Bilateral      ORTHOPEDIC SURGERY   1990s     Fusion of disc x 2    MI CARDIAC SURG PROCEDURE UNLIST   2017     heart cath -- no additional stents    MI CARDIAC SURG PROCEDURE UNLIST   2016     1 stent placed    TOTAL ABDOM HYSTERECTOMY        TOTAL COLECTOMY             Family History         Family History   Problem Relation Age of Onset    Coronary Art Dis Brother      Other Brother           SCD    Other Sister           PCI's    Heart Disease Other           Ischemic Heart Disease    Diabetes Son           NON-INSULIN DEPENDENT    Heart Disease Other           2 MIs    Lung Disease Brother      Kidney Disease Brother      Cancer Sister           BREAST    Hypertension Other      Other Other           No family history of pulmonary concerns    Deep Vein Thrombosis Mother      Coronary Art Dis Father 48         Social History            Tobacco Use    Smoking status: Never    Smokeless tobacco: Never    Tobacco comments:       Quit smoking: Secondhand smoke exposure for 16 years from her  cigarettes   Substance Use Topics    Alcohol use: Never            ROS:  Constitutional:   Negative for fevers and unexplained weight loss. Eyes:   Negative for visual disturbance. ENT:   Negative for significant hearing loss and tinnitus. Respiratory:   Negative for hemoptysis. Cardiovascular:   Negative except as noted in HPI.   Gastrointestinal:   Negative for melena and abdominal pain. Genitourinary:   Negative for hematuria, renal stones. Integumentary:   Negative for rash or non-healing wounds  Hematologic/Lymphatic:   Negative for excessive bleeding hx or clotting disorder. Musculoskeletal:  Negative for active, unexplained/severe joint pain. Neurological:   Negative for stroke. Behavioral/Psych:   Negative for suicidal ideations. Endocrine:   Negative for uncontrolled diabetic symptoms including polyuria, polydipsia and poor wound healing.            PHYSICAL EXAM:      BP (!) 140/72   Pulse 68   Ht 5' 7\" (1.702 m)   Wt 170 lb (77.1 kg)   BMI 26.63 kg/m²    General/Constitutional:   Alert and oriented x 3, no acute distress  HEENT:   normocephalic, atraumatic, moist mucous membranes  Neck:   No JVD or carotid bruits bilaterally  Cardiovascular:   regular rate and rhythm, no murmur/rub/gallop appreciated  Pulmonary:   clear to auscultation bilaterally, no respiratory distress  Abdomen:   soft, non-tender, non-distended  Ext:   No sig LE edema bilaterally  Skin:  warm and dry, no obvious rashes seen  Neuro:   no obvious sensory or motor deficits  Psychiatric:   normal mood and affect              Lab Results   Component Value Date/Time      07/26/2022 08:45 AM     K 4.7 07/26/2022 08:45 AM      07/26/2022 08:45 AM     CO2 26 07/26/2022 08:45 AM     BUN 19 07/26/2022 08:45 AM     CREATININE 1.30 07/26/2022 08:45 AM     GLUCOSE 115 07/26/2022 08:45 AM     CALCIUM 9.1 07/26/2022 08:45 AM               Lab Results   Component Value Date     WBC 6.2 07/26/2022     HGB 12.6 07/26/2022     HCT 41.0 07/26/2022     .1 (H) 07/26/2022      07/26/2022               Lab Results   Component Value Date     TSH 2.210 01/26/2022               Lab Results   Component Value Date     LABA1C 6.7 (H) 01/26/2022            Lab Results   Component Value Date      01/26/2022               Lab Results   Component Value Date     CHOL 161 07/26/2022     CHOL 214 (H) 04/26/2022     CHOL 140 01/26/2022            Lab Results   Component Value Date     TRIG 165 (H) 07/26/2022     TRIG 181 (H) 04/26/2022     TRIG 102 01/26/2022            Lab Results   Component Value Date     HDL 65 (H) 07/26/2022     HDL 56 04/26/2022     HDL 70 (H) 01/26/2022            Lab Results   Component Value Date     LDLCALC 63 07/26/2022     LDLCALC 126 (H) 04/26/2022     LDLCALC 49.6 01/26/2022            Lab Results   Component Value Date     LABVLDL 33 (H) 07/26/2022     LABVLDL 20.4 01/26/2022     LABVLDL 21 06/09/2020     VLDL 32 04/26/2022     VLDL 37 10/26/2021     VLDL 16 04/01/2021            Lab Results   Component Value Date     CHOLHDLRATIO 2.5 07/26/2022     CHOLHDLRATIO 2.0 01/26/2022               I have Independently reviewed prior care notes, any ER records available, cardiac testing, labs and results with the patient and before seeing the patient today. Also independently reviewed outside records when available. ASSESSMENT:     Holden Roldan was seen today for coronary artery disease. Diagnoses and all orders for this visit:     Coronary artery disease involving native coronary artery of native heart without angina pectoris  -     Case Request Cardiac Cath Lab     Bilateral carotid artery stenosis     Mixed hyperlipidemia     Dyspnea on exertion           PLAN:      1. CAD:    On ASA and zocor. More LOAIZA, more angina now, worsening. Plan on [facility]. Plan for Left Heart Catheterization and possible Percutaneous Coronary Intervention (PCI) at [facility] due to Worsening angina within the last 2 mos as described in HPI. Discussed risk of cardiac catheterization and potential PCI with the patient in detail. These risks include, but are not limited to, bleeding, stroke, heart attack, cardiac arrhythmias, allergic reactions, atheroemboli, acute kidney injury and cardiac arrest/death.   Local complications at the site of catheter insertion were also reviewed and discussed. The patient voiced complete understanding about these risks. The patient agrees to proceed with the aforementioned associated risks. 2. Carotid Dz:  Remain on ASA and statin, no more testing. 3. HPL:  Reviewed repatha or other med options, she declined in the past.          Patient has been instructed and agrees to call our office with any issues or other concerns related to their cardiac condition(s) and/or complaint(s). Return for Return After Test.         RUSTY MERCHANT DO  8/3/2022             Instructions      Return for Return After Test.  AVS (Printed 8/3/2022)        Additional Documentation    Vitals:  /72 Important   Pulse 68  Ht 5' 7\" (1.702 m)  Wt 170 lb (77.1 kg)  BMI 26.63 kg/m²  BSA 1.91 m²  Pain Sc   0 - No pain    More Vitals   Flowsheets:  Calorie Assessment     Encounter Info:  Billing Info,  Allergies,  Detailed Report,  History,  Medications,  Questionnaires          Travel Screening   Screenings since 08/02/22 0000  08/12/22 0600          In the last 10 days, have you been in contact with someone who was confirmed or suspected to have Coronavirus/COVID-19? No / Unsure Estanislado Leslie   Have you had a COVID-19 viral test in the last 10 days? No Estanislado Leslie   Do you have any of the following new or worsening symptoms? None of these Estanislado Leslie   Have you traveled internationally or domestically in the last month? No Estanislado Leslie         08/03/22 0831          Have you had a COVID-19 viral test in the last 10 days? No Breana Rice   Do you have any of the following new or worsening symptoms? None of these Breana Rice     Travel History   Travel since 07/13/22   No documented travel since 07/13/22      Communications    View Encounter Conversation Summary            Chart Review Routing History    No routing history on file.     Encounter Status    Signed by Evon Berg DO on 8/3/22 at 09:33    BestPractice Advisories    Click to view BestPractice Advisory history Encounter Messages    Read Composed From To  Subject   Y 7/27/2022  6:52 AM DO Yogesh Riveraer  Upcoming appointment at [address] [place] [address] on 8/03/22       Linked Episodes    Left heart cath / corona Noted 8/3/2022        Orders Placed    Case Request Cardiac Cath Lab        Outpatient Medications at End of Encounter as of 8/3/2022    Multiple Vitamins-Minerals (MULTIVITAMIN ADULTS 50+) TABS (Taking) Take 1 tablet by mouth daily   FLUoxetine HCl, PMDD, 20 MG TABS (Taking) Take 20 mg by mouth daily   simvastatin (ZOCOR) 20 MG tablet (Taking) Take 1 tablet by mouth nightly   albuterol sulfate  (90 Base) MCG/ACT inhaler (Taking) Inhale 1 puff into the lungs every 6 hours as needed   aspirin 81 MG EC tablet (Taking) Take by mouth daily       Medication Changes      None     Medication List         Visit Diagnoses      Coronary artery disease involving native coronary artery of native heart without angina pectoris    Bilateral carotid artery stenosis    Mixed hyperlipidemia    Dyspnea on exertion     Problem List

## 2022-08-12 NOTE — PROGRESS NOTES
Report received from [address]. Procedural findings communicated. Intra procedural  medication administration reviewed. Progression of care discussed.      Patient received into [address] [facility] 6 post sheath removal.     Access site without bleeding or swelling yes    Dressing dry and intact yes    Patient instructed to limit movement to right upper extremity    Routine post procedural vital signs and site assessment initiated yes

## 2022-08-12 NOTE — DISCHARGE INSTRUCTIONS
HEART CATHETERIZATION/ANGIOGRAPHY DISCHARGE INSTRUCTIONS    Check puncture site frequently for swelling or bleeding. If there is any bleeding, apply pressure over the area with a clean towel or washcloth and call 911. Notify your doctor for any redness, swelling, drainage, or oozing from the puncture site. Notify your doctor for any fever or chills. If the extremity becomes cold, numb, or painful call [facility] Cardiology at [phone]. Activity should be limited for the next 48 hours. No heavy lifting, pushing, pulling  or strenuous activity for 48 hours. No heavy lifting (anything over 10 pounds) for 3 days. You may resume your usual diet. Drink more fluids than usual.  Have a responsible person drive you home and stay with you for at least 24 hours after your heart catheterization/angiography. You may remove bandage from your right wrist in 24 hours. You may shower in 24 hours. No tub baths, hot tubs, or swimming for 1 week. Do not place any lotions, creams, powders, or ointments over puncture site for 1 week. You may place a clean band-aid over the puncture site each day for 5 days. Change daily. Sedation for a Medical Procedure: Care Instructions     You were given a sedative medication during your visit. While many of the effects will have worn   off before you leave; you may continue to feel some effects for several hours. Common side effects from sedation include:  Feeling sleepy. (Your doctors and nurses will make sure you are not too sleepy to go home.)  Nausea and vomiting. This usually does not last long. Feeling tired. How can you care for yourself at home? Activity    Don't do anything for 24 hours that requires attention to detail. It takes time for the medicine effects to completely wear off. Do not make important legal decisions for 24 hours. Do not sign any legal documents for 24 hours.      Do not drink alcohol today     For your safety, you should not drive or operate heavy machinery for the remainder of the day     Rest when you feel tired. Getting enough sleep will help you recover. Diet    You can eat your normal diet, unless your doctor gives you other instructions. If your stomach is upset, try clear liquids and bland, low-fat foods like plain toast or rice. Drink plenty of fluids (unless your doctor tells you not to). Don't drink alcohol for 24 hours. Medicines    Be safe with medicines. Read and follow all instructions on the label. If the doctor gave you a prescription medicine for pain, take it as prescribed. If you are not taking a prescription pain medicine, ask your doctor if you can take an over-the-counter medicine. If you think your pain medicine is making you sick to your stomach: Take your medicine after meals (unless your doctor has told you not to). Ask your doctor for a different pain medicine. I have read the above instructions and have had the opportunity to ask questions.       Patient: ________________________   Date: _____________    Witness: _______________________   Date: _____________

## 2022-08-12 NOTE — PROGRESS NOTES
Patient received to [address] [place] [address] # 12  Ambulatory from [facility]. Patient scheduled for [facility] today with Dr Sourav Ewing. Procedure reviewed & questions answered, voiced good understanding consent obtained & placed on chart. All medications and medical history reviewed. Will prep patient per orders. Patient & family updated on plan of care. The patient has a fraility score of 3-MANAGING WELL, based on patient A&Ox3, patient able to ambulate to room without difficulty.     Patient took aspirin 324mg at 0530 prior to arrival.

## 2022-08-12 NOTE — PROGRESS NOTES
Discharge instructions given. TR band removed from right wrist with no bleeding or swelling noted. No complaints voiced. 4x4 gauze and tegaderm applied to site.

## 2022-08-12 NOTE — Clinical Note
Contrast Dose Calculator:   Patient's age: [de-identified].   Patient's sex: Female. Patient weight (kg) = 77.1. Creatinine level (mg/dL) = 1.3. Creatinine clearance (mL/min): 42.01. Contrast concentration (mg/mL) = 370. MACD = 296 mL. Max Contrast dose per Creatinine Cl calculator = 94.52 mL.

## 2022-08-12 NOTE — Clinical Note
Aspirin Registry Question:   Aspirin was given prior to the procedure.    [email or web address]

## 2022-08-12 NOTE — PROGRESS NOTES
TRANSFER - OUT REPORT:    [facility] with Dr. Hany Jasso  Access: Right radial   No intervention    Tr band applied to right radial  No bleeding or hematoma site soft    MAR  2.5 mg versed  5,000 units heparin     Verbal report given to RN on Lidia Kee  being transferred to [address] [place] [address] for routine progression of patient care       Report consisted of patient's Situation, Background, Assessment and Recommendations(SBAR). Information from the following report(s) Nurse Handoff Report and MAR was reviewed with the receiving nurse. Opportunity for questions and clarification was provided.       Patient transported with:  Registered Nurse

## 2022-09-09 ENCOUNTER — OFFICE VISIT (OUTPATIENT)
Dept: CARDIOLOGY CLINIC | Age: 82
End: 2022-09-09
Payer: MEDICARE

## 2022-09-09 VITALS
HEART RATE: 82 BPM | DIASTOLIC BLOOD PRESSURE: 72 MMHG | HEIGHT: 67 IN | BODY MASS INDEX: 26.84 KG/M2 | WEIGHT: 171 LBS | SYSTOLIC BLOOD PRESSURE: 136 MMHG

## 2022-09-09 DIAGNOSIS — I50.32 DIASTOLIC CHF, CHRONIC (HCC): ICD-10-CM

## 2022-09-09 DIAGNOSIS — N18.31 STAGE 3A CHRONIC KIDNEY DISEASE (HCC): ICD-10-CM

## 2022-09-09 DIAGNOSIS — Z78.9 ACE INHIBITOR INTOLERANCE: ICD-10-CM

## 2022-09-09 DIAGNOSIS — I65.23 BILATERAL CAROTID ARTERY STENOSIS: ICD-10-CM

## 2022-09-09 DIAGNOSIS — I25.10 CORONARY ARTERY DISEASE INVOLVING NATIVE CORONARY ARTERY OF NATIVE HEART WITHOUT ANGINA PECTORIS: Primary | ICD-10-CM

## 2022-09-09 PROCEDURE — 99214 OFFICE O/P EST MOD 30 MIN: CPT | Performed by: INTERNAL MEDICINE

## 2022-09-09 PROCEDURE — 1123F ACP DISCUSS/DSCN MKR DOCD: CPT | Performed by: INTERNAL MEDICINE

## 2022-09-09 PROCEDURE — 1036F TOBACCO NON-USER: CPT | Performed by: INTERNAL MEDICINE

## 2022-09-09 PROCEDURE — 1090F PRES/ABSN URINE INCON ASSESS: CPT | Performed by: INTERNAL MEDICINE

## 2022-09-09 PROCEDURE — G8399 PT W/DXA RESULTS DOCUMENT: HCPCS | Performed by: INTERNAL MEDICINE

## 2022-09-09 PROCEDURE — G8417 CALC BMI ABV UP PARAM F/U: HCPCS | Performed by: INTERNAL MEDICINE

## 2022-09-09 PROCEDURE — G8427 DOCREV CUR MEDS BY ELIG CLIN: HCPCS | Performed by: INTERNAL MEDICINE

## 2022-09-09 RX ORDER — HYDROCHLOROTHIAZIDE 25 MG/1
25 TABLET ORAL EVERY MORNING
Qty: 90 TABLET | Refills: 3 | Status: SHIPPED | OUTPATIENT
Start: 2022-09-09

## 2022-09-09 NOTE — PROGRESS NOTES
[address], [address] [facility], [address] [place] [address]  PHONE: [phone]     22    NAME:  Lor Ramirez  : 1940  MRN: [id number]       SUBJECTIVE:   Lor Ramirez is a [de-identified] y.o. female seen for a follow up visit regarding the following:     Chief Complaint   Patient presents with    Chest Pain     Post cath       HPI: Here today for follow up for CAD   PCI to Diag, echo showed normal EF  Carotid US:  3/16 and 2018: mild carotid Dz  LHC 11/10/17: mild CAD, EF 60%. LHC:  mild CAD, EF normal, EDP 36     LOAIZA ongoing issue. No real edema. LOAIZA walking to [employer]. No CP. No edema. Patient denies recent history of orthopnea, PND, excessive dizziness and/or syncope. She has GLEN, cannot wear mask. Did not tolerate lipitor, crestor. Past Medical History, Past Surgical History, Family history, Social History, and Medications were all reviewed with the patient today and updated as necessary. Current Outpatient Medications   Medication Sig Dispense Refill    hydroCHLOROthiazide (HYDRODIURIL) 25 MG tablet Take 1 tablet by mouth every morning 90 tablet 3    Multiple Vitamins-Minerals (MULTIVITAMIN ADULTS 50+) TABS Take 1 tablet by mouth daily      FLUoxetine HCl, PMDD, 20 MG TABS Take 20 mg by mouth daily 90 tablet 1    simvastatin (ZOCOR) 20 MG tablet Take 1 tablet by mouth nightly 90 tablet 1    albuterol sulfate  (90 Base) MCG/ACT inhaler Inhale 1 puff into the lungs every 6 hours as needed      aspirin 81 MG EC tablet Take by mouth daily       No current facility-administered medications for this visit.         Allergies   Allergen Reactions    Atorvastatin Other (See Comments)    Codeine Hives    Gabapentin Hives and Itching    Lisinopril Other (See Comments)     Ace cough    Pravastatin Other (See Comments)    Sulfa Antibiotics Other (See Comments)     Pt takes Celebrex without problems    Sulfur Hives    Esomeprazole Rash     All over rash and itching     Rosuvastatin Rash     Patient Active Problem List    Diagnosis Date Noted    Accelerating angina ([facility] [address] 08/03/2022     Priority: High     Added automatically from request for surgery [id number]      Diastolic CHF, chronic ([address] 09/09/2022     Priority: Medium    Chronic renal disease, stage III [facility]) [[id number]] 07/26/2022     Priority: Medium    Type 2 diabetes mellitus with chronic kidney disease ([address] 01/18/2022    Coronary artery disease involving native coronary artery of native heart with angina pectoris ([address] 09/10/2021    Macrocytosis 05/17/2021    Anemia 05/17/2021    Cognitive disorder 03/11/2021    Type 2 diabetes with nephropathy ([address] 10/17/2018    Acute pain of right knee 10/25/2017    Right calf pain 10/25/2017    Primary osteoarthritis of both knees 12/21/2016    Carotid artery disease ([address] 03/24/2016    Esophageal reflux     Major depressive disorder, recurrent episode, mild (HCC)     Dyspnea      Shortness of breath        Precordial chest pain     Coronary artery disease involving native coronary artery without angina pectoris 02/04/2016     PCI 2/16: Diag PCI with ZOIE        Post PTCA 02/04/2016    Diverticulitis large intestine     Diverticulitis large intestine w/o perforation or abscess w/o bleeding 10/26/2015    Lower abdominal pain 10/26/2015    Esophageal dysphagia 10/26/2015    ACE inhibitor intolerance      cough with lisinopril        Type II diabetes mellitus, well controlled ([address] 06/26/2015    Gastritis due to nonsteroidal anti-inflammatory drug (NSAID) 06/26/2015    Hx of bilateral mastectomy     Postmenopausal atrophic vaginitis     Symptomatic menopausal or female climacteric states     Hypertonicity of bladder     Mixed hyperlipidemia     Insomnia, unspecified     Arthritis     Generalized anxiety disorder     Allergic rhinitis 11/10/2014    Asthma 11/10/2014      Past Surgical History:   Procedure Laterality Date    APPENDECTOMY      BACK SURGERY      BLADDER SUSPENSION      CARDIAC PROCEDURE N/A 8/12/2022    Left heart cath / coronary angiography performed by David Roman MD at [facility] CARDIAC CATH LAB    CHOLECYSTECTOMY      COLONOSCOPY  10/26/2011    COLONOSCOPY  2017    wnl    COLONOSCOPY      HYSTERECTOMY (CERVIX STATUS UNKNOWN)      MASTECTOMY Bilateral 1989    Bilateral, breast cancer, no chemotherapy, no radiation    MASTECTOMY, RADICAL Bilateral     ORTHOPEDIC SURGERY  1990s    Fusion of disc x 2    DC CARDIAC SURG PROCEDURE UNLIST  2017    heart cath -- no additional stents    DC CARDIAC SURG PROCEDURE UNLIST  2016    1 stent placed    TOTAL ABDOM HYSTERECTOMY      TOTAL COLECTOMY       Family History   Problem Relation Age of Onset    Coronary Art Dis Brother     Other Brother         SCD    Other Sister         PCI's    Heart Disease Other         Ischemic Heart Disease    Diabetes Son         NON-INSULIN DEPENDENT    Heart Disease Other         2 MIs    Lung Disease Brother     Kidney Disease Brother     Cancer Sister         BREAST    Hypertension Other     Other Other         No family history of pulmonary concerns    Deep Vein Thrombosis Mother     Coronary Art Dis Father 48     Social History     Tobacco Use    Smoking status: Never    Smokeless tobacco: Never    Tobacco comments:     Quit smoking: Secondhand smoke exposure for 16 years from her  cigarettes   Substance Use Topics    Alcohol use: Never         ROS:    No obvious pertinent positives on review of systems except for what was outlined in the HPI today.       PHYSICAL EXAM:     /72   Pulse 82   Ht 5' 7\" (1.702 m)   Wt 171 lb (77.6 kg)   BMI 26.78 kg/m²    General/Constitutional:   Alert and oriented x 3, no acute distress  HEENT:   normocephalic, atraumatic, moist mucous membranes  Neck:   No JVD or carotid bruits bilaterally  Cardiovascular:   regular rate and rhythm, no murmur/rub/gallop appreciated  Pulmonary:   clear to auscultation bilaterally, no respiratory distress  Abdomen:   soft, non-tender, non-distended  Ext:   No sig LE edema bilaterally  Skin:  warm and dry, no obvious rashes seen  Neuro:   no obvious sensory or motor deficits  Psychiatric:   normal mood and affect      Lab Results   Component Value Date/Time     08/12/2022 07:07 AM    K 5.2 08/12/2022 07:07 AM     08/12/2022 07:07 AM    CO2 30 08/12/2022 07:07 AM    BUN 16 08/12/2022 07:07 AM    CREATININE 1.30 08/12/2022 07:07 AM    GLUCOSE 105 08/12/2022 07:07 AM    CALCIUM 9.3 08/12/2022 07:07 AM        Lab Results   Component Value Date    WBC 6.2 07/26/2022    HGB 12.6 07/26/2022    HCT 41.0 07/26/2022    .1 (H) 07/26/2022     07/26/2022       Lab Results   Component Value Date    TSH 2.210 01/26/2022       Lab Results   Component Value Date    LABA1C 6.7 (H) 01/26/2022     Lab Results   Component Value Date     01/26/2022       Lab Results   Component Value Date    CHOL 161 07/26/2022    CHOL 214 (H) 04/26/2022    CHOL 140 01/26/2022     Lab Results   Component Value Date    TRIG 165 (H) 07/26/2022    TRIG 181 (H) 04/26/2022    TRIG 102 01/26/2022     Lab Results   Component Value Date    HDL 65 (H) 07/26/2022    HDL 56 04/26/2022    HDL 70 (H) 01/26/2022     Lab Results   Component Value Date    LDLCALC 63 07/26/2022    LDLCALC 126 (H) 04/26/2022    LDLCALC 49.6 01/26/2022     Lab Results   Component Value Date    LABVLDL 33 (H) 07/26/2022    LABVLDL 20.4 01/26/2022    LABVLDL 21 06/09/2020    VLDL 32 04/26/2022    VLDL 37 10/26/2021    VLDL 16 04/01/2021     Lab Results   Component Value Date    CHOLHDLRATIO 2.5 07/26/2022    CHOLHDLRATIO 2.0 01/26/2022           I have Independently reviewed prior care notes, any ER records available, cardiac testing, labs and results with the patient and before seeing the patient today. Also independently reviewed outside records when available. ASSESSMENT:    Shabnam Adrian was seen today for chest pain.     Diagnoses and all orders for this visit:    Coronary artery disease involving native coronary artery of native heart without angina pectoris  -     Basic Metabolic Panel; Future  -     Brain Natriuretic Peptide; Future    Bilateral carotid artery stenosis    Stage 3a chronic kidney disease (HCC)    ACE inhibitor intolerance    Diastolic CHF, chronic ([address]  -     Basic Metabolic Panel; Future  -     Brain Natriuretic Peptide; Future    Other orders  -     hydroCHLOROthiazide (HYDRODIURIL) 25 MG tablet; Take 1 tablet by mouth every morning        PLAN:       1. CAD:    On ASA and zocor. LHC with mild dz, EDP 36, EF normal.     Trial of HCTZ, labs in 4 weeks. K 5.2.       2. Carotid Dz:  Remain on ASA and statin, no more testing. 3. HPL:  Reviewed repatha or other med options, she declined in the past.       Patient has been instructed and agrees to call our office with any issues or other concerns related to their cardiac condition(s) and/or complaint(s). Return in about 1 month (around 10/9/2022).        RUSTY MERCHANT DO  9/9/2022

## 2022-09-21 DIAGNOSIS — D53.9 ANEMIA ASSOCIATED WITH NUTRITIONAL DEFICIENCY: ICD-10-CM

## 2022-09-21 DIAGNOSIS — D53.9 NUTRITIONAL ANEMIA, UNSPECIFIED: ICD-10-CM

## 2022-09-21 DIAGNOSIS — D64.9 ANEMIA, UNSPECIFIED TYPE: Primary | ICD-10-CM

## 2022-09-21 NOTE — PROGRESS NOTES
[address] [place] [address] Hematology and Oncology: Established patient - follow up     Chief Complaint   Patient presents with    Follow-up     Reason for Referral: Anemia, unspecified type   Referring Provider:  Nasario Osler, NP   Primary Care Provider: Margarette Bernheim, MD   Family History of Cancer/Hematologic Disorders: Mother with DVT; Sister with breast cancer; Personal history of breast cancer   Presenting Symptoms: joint pain    History of Present Illness:  Ms. Rocío Beasley is a [de-identified] y.o. female who presents today for follow up regarding anemia. The past medical history is significant for UTI, type II diabetes, DVT, menopause, sleep apnea, GERD,  CP, atrophic vaginitis, hyperlipidemia, depression, LVH, abdominal pain, insomnia, HTN, hypertonicity of bladder, anxiety, gastritis, hemorrhoids, dyspnea, diverticulitis, breast cancer, CAD, asthma, and arthritis. Her surgical history includes MAME,  radical mastectomy (bilateral), heart catherization with stent placement, total colectomy, disc fusion, colonoscopy, cholecystectomy, bladder suspension, and appendectomy. In 4/2021 Ms. Rocío Beasley presented to her PCP for follow up on lab results. Cbc w decreased RBC of 3.51 and elevated MCV of 98. Her chemistry panel was WNL. A referral was placed to hematology to further evaluate her abnormal RBCs and MCV.   never used tobacco products. She denies use of alcohol or drug substances     Patient has history of bilateral breast cancer in 1989, status post bilateral mastectomies with reconstruction. Did not need chemotherapy, radiation or endocrine therapy per patient history. She was treated in [facility]. She does not recall stages of  her disease. Today, she is here for FU after being lost to fu since 6/2021. She feels well. Not currently anemic, Hb 12. 4.  she continues with b12 inj with her PCP . Shes also on vit D    No B symptoms. No s/s of infection.         Chronological Events:   1989 bilateral breast cancer status post bilateral mastectomies with reconstruction, no chemo or radiation or endocrine therapy. 10/22/2019 WBC 9.1, Hb 10.8, MCV 92, platelets 771   3/5/9439 WBC 6.2, Hb 11.8, MCV 97, platelets 162   45/26/1509 WBC 5.8, Hb 11.5, MCV 96, platelets 432   4/0/4442 WBC 5.2, Hb 11.2, MCV 95, platelets 814   1/73/7615 B12 214   4/1/2021 WBC 5.5, Hb 11.5, MCV 98, platelets 129, F41 029, folate 20   Started B12 injections and oral iron   5/17/2021 heme-onc consultation   5/27/21 FU NP Sepideh, arm swelling after COVID vaccine    6/28/21 FU doing well; labs reviewed; pt wishes to spread out her apts. 9/2022 FU - lost to FU since last summer; labs reviewed       Family History   Problem Relation Age of Onset    Coronary Art Dis Brother     Other Brother         SCD    Other Sister         PCI's    Heart Disease Other         Ischemic Heart Disease    Diabetes Son         NON-INSULIN DEPENDENT    Heart Disease Other         2 MIs    Lung Disease Brother     Kidney Disease Brother     Cancer Sister         BREAST    Hypertension Other     Other Other         No family history of pulmonary concerns    Deep Vein Thrombosis Mother     Coronary Art Dis Father 48      Social History     Socioeconomic History    Marital status:      Spouse name: None    Number of children: None    Years of education: None    Highest education level: None   Tobacco Use    Smoking status: Never    Smokeless tobacco: Never    Tobacco comments:     Quit smoking: Secondhand smoke exposure for 16 years from her  cigarettes   Substance and Sexual Activity    Alcohol use: Never    Drug use: No   Social History Narrative    Lifelong nonsmoker with 16 year secondhand smoke exposure history. Worked as a  at [employer], denies industrial toxin exposure history. , 3 children who are healthy. Denies alcohol use. Has always lived in [place]. No pets, no history of pet bird.      Social Determinants of Health     Financial Resource Strain: Low Risk     Difficulty of Paying Living Expenses: Not very hard   Food Insecurity: No Food Insecurity    Worried About Running Out of Food in the Last Year: Never true    Ran Out of Food in the Last Year: Never true   Transportation Needs: No Transportation Needs    Lack of Transportation (Medical): No    Lack of Transportation (Non-Medical): No        Review of Systems   Constitutional:  Negative for appetite change, chills, diaphoresis, fatigue, fever and unexpected weight change. HENT:   Negative for hearing loss, mouth sores, nosebleeds, sore throat, trouble swallowing and voice change. Eyes:  Negative for icterus. Respiratory:  Negative for chest tightness, hemoptysis, shortness of breath and wheezing. Cardiovascular:  Negative for chest pain, leg swelling and palpitations. Gastrointestinal:  Negative for abdominal distention, abdominal pain, blood in stool, constipation, diarrhea, nausea and vomiting. Endocrine: Negative for hot flashes. Genitourinary:  Negative for difficulty urinating, frequency, vaginal bleeding and vaginal discharge. Musculoskeletal:  Positive for arthralgias. Negative for flank pain, gait problem and myalgias. Skin:  Negative for itching, rash and wound. Neurological:  Negative for dizziness, extremity weakness, gait problem, headaches and numbness. Psychiatric/Behavioral:  Positive for depression. Negative for confusion. The patient is not nervous/anxious.        Allergies   Allergen Reactions    Atorvastatin Other (See Comments)    Codeine Hives    Gabapentin Hives and Itching    Lisinopril Other (See Comments)     Ace cough    Pravastatin Other (See Comments)    Sulfa Antibiotics Other (See Comments)     Pt takes Celebrex without problems    Sulfur Hives    Esomeprazole Rash     All over rash and itching     Rosuvastatin Rash     Past Medical History:   Diagnosis Date    Abnormal EKG     ACE inhibitor intolerance 2014 cough with lisinopril    Allergic rhinitis 11/10/2014    Allergic rhinitis, cause unspecified     Arthritis     Asthma 11/10/2014    Backache, unspecified     CAD (coronary artery disease) 2/4/2016    Cancer [facility])     breast    Chronic renal disease, stage III ([facility] [address] [522093] 7/26/2022    Continuous leakage(788.37)     Depressive disorder, not elsewhere classified     Diverticulitis large intestine     Diverticulitis large intestine w/o perforation or abscess w/o bleeding 10/26/2015    Diverticulitis of colon (without mention of hemorrhage)(562.11)     Dyslipidemia     Dyspnea     Shortness of breath    Encounter for long-term (current) use of aspirin     Esophageal dysphagia 10/26/2015    Esophageal reflux     Essential hypertension     External hemorrhoids without mention of complication     Gastritis due to nonsteroidal anti-inflammatory drug (NSAID) 6/26/2015    Generalized anxiety disorder     GERD (gastroesophageal reflux disease)     Heart disease, unspecified     HLD (hyperlipidemia)     HTN (hypertension), malignant 2/4/2016    Hx of bilateral mastectomy     Hx of hysterectomy     Hypertension 6/26/2015    Hypertonicity of bladder     Insomnia, unspecified     Lower abdominal pain 10/26/2015    LVH (left ventricular hypertrophy)     L or R Cardiomegaly    Major depressive disorder, recurrent episode, mild (HCC)     Mixed hyperlipidemia     Normal cardiac stress test 2/2012    normal stress echo with Ef 60-65% with Dr Marshall Morelos PTCA 2/4/2016    Postmenopausal atrophic vaginitis     Precordial chest pain     Reflux 10/26/2015    Sleep apnea     does not sleep with cpap    Symptomatic menopausal or female climacteric states     Thromboembolus [facility]) 1970's    legs    Type II diabetes mellitus, well controlled ([facility] [address] 6/26/2015    Type II or unspecified type diabetes mellitus with ophthalmic manifestations, not stated as uncontrolled(250.50)     Type II or unspecified type diabetes mellitus without mention of complication, uncontrolled     Urinary tract infection, site not specified      Past Surgical History:   Procedure Laterality Date    APPENDECTOMY      BACK SURGERY      BLADDER SUSPENSION      CARDIAC PROCEDURE N/A 8/12/2022    Left heart cath / coronary angiography performed by Darrel Del Rio MD at [facility] CARDIAC CATH LAB    CHOLECYSTECTOMY      COLONOSCOPY  10/26/2011    COLONOSCOPY  2017    wnl    COLONOSCOPY      HYSTERECTOMY (CERVIX STATUS UNKNOWN)      MASTECTOMY Bilateral 1989    Bilateral, breast cancer, no chemotherapy, no radiation    MASTECTOMY, RADICAL Bilateral     ORTHOPEDIC SURGERY  1990s    Fusion of disc x 2    [place] CARDIAC SURG PROCEDURE UNLIST  2017    heart cath -- no additional stents    [place] CARDIAC SURG PROCEDURE UNLIST  2016    1 stent placed    TOTAL ABDOM HYSTERECTOMY      TOTAL COLECTOMY       Current Outpatient Medications   Medication Sig Dispense Refill    Multiple Vitamins-Minerals (MULTIVITAMIN ADULTS 50+) TABS Take 1 tablet by mouth daily      FLUoxetine HCl, PMDD, 20 MG TABS Take 20 mg by mouth daily 90 tablet 1    simvastatin (ZOCOR) 20 MG tablet Take 1 tablet by mouth nightly 90 tablet 1    albuterol sulfate  (90 Base) MCG/ACT inhaler Inhale 1 puff into the lungs every 6 hours as needed      aspirin 81 MG EC tablet Take by mouth daily      hydroCHLOROthiazide (HYDRODIURIL) 25 MG tablet Take 1 tablet by mouth every morning (Patient not taking: Reported on 9/22/2022) 90 tablet 3     No current facility-administered medications for this visit. No flowsheet data found. OBJECTIVE:  /66   Pulse 82   Temp 99.1 °F (37.3 °C) (Oral)   Resp 14   Ht 5' 7\" (1.702 m)   Wt 169 lb 3.2 oz (76.7 kg)   SpO2 99%   BMI 26.50 kg/m²       ECOG PERFORMANCE STATUS - 1- Restricted in physically strenuous activity but ambulatory and able to carry out work of a light or sedentary nature such as light house work, office work. Pain - 0 - No pain/10.  None/Minimal pain - not affecting QOL     Fatigue - No flowsheet data found. Distress - No flowsheet data found. Physical Exam  Vitals reviewed. Exam conducted with a chaperone present. Constitutional:       General: She is not in acute distress. Appearance: Normal appearance. She is not ill-appearing or toxic-appearing. HENT:      Head: Normocephalic and atraumatic. Nose: Nose normal.      Mouth/Throat:      Mouth: Mucous membranes are moist.   Eyes:      General: No scleral icterus. Extraocular Movements: Extraocular movements intact. Conjunctiva/sclera: Conjunctivae normal.      Pupils: Pupils are equal, round, and reactive to light. Cardiovascular:      Rate and Rhythm: Normal rate and regular rhythm. Heart sounds: No murmur heard. Pulmonary:      Effort: Pulmonary effort is normal. No respiratory distress. Breath sounds: Normal breath sounds. No wheezing or rales. Chest:          Comments: Hx: S/p bilateral mastectomies with reconstruction  Some tension in pectoralis on the left - pt denied DROM   Abdominal:      General: There is no distension. Palpations: Abdomen is soft. There is no mass. Tenderness: There is no abdominal tenderness. There is no guarding. Musculoskeletal:      Cervical back: Normal range of motion. Lymphadenopathy:      Cervical: No cervical adenopathy. Upper Body:      Right upper body: No supraclavicular or axillary adenopathy. Left upper body: No supraclavicular or axillary adenopathy. Skin:     General: Skin is warm and dry. Coloration: Skin is not jaundiced or pale. Findings: No rash. Neurological:      General: No focal deficit present. Mental Status: She is alert and oriented to person, place, and time. Psychiatric:         Behavior: Behavior normal.         Thought Content: Thought content normal.        Labs:  No results found for this or any previous visit (from the past 168 hour(s)).     Imaging: reviewed ASSESSMENT:     Diagnosis Orders   1. Anemia, unspecified type  Vitamin B12    Vitamin D 25 Hydroxy      2. Vitamin D deficiency, unspecified   Vitamin D 25 Hydroxy         Ms. Lisa Tran is here for FU of anemia. 1. Anemia - mild, macrocytic   - currently resolved. Hb at 12.4.    - patient continues on  B12 injections with NP  - flow neg, EPO 12.6      2. Distant history of bilateral breast cancer-no available records from that time, history provided by patient   - s/p bilateral mastectomies with reconstruction, patient reports no history of chemotherapy, radiation or endocrine therapy. - Although recurrence at this time would be unusual, she does report mild weight loss (10 to 15 pounds in last 6 months). tumor markers normal at last visit. No current signs and symptoms suggestive of recurrence. Chest exam with no lumps/masses. - She does have some tension in the left pectoralis ligament, however denies DROM. Declined need for onc rehab. 3. Supportive care    - increased Cr - encouraged adequate water intake, FU with PCP   - vit D defic - increase dose - RN will notify pt       RTC PRN        Lab studies and imaging studies were personally reviewed. Pertinent old records were reviewed. MDM  Number of Diagnoses or Management Options  Anemia, unspecified type: established, improving  Vitamin D deficiency, unspecified : new, needed workup     Amount and/or Complexity of Data Reviewed  Clinical lab tests: ordered and reviewed  Tests in the radiology section of CPT®: reviewed  Tests in the medicine section of CPT®: ordered  Review and summarize past medical records: yes  Independent visualization of images, tracings, or specimens: yes    Risk of Complications, Morbidity, and/or Mortality  Presenting problems: moderate  Diagnostic procedures: low  Management options: low        Lab studies and imaging studies were personally reviewed. Pertinent old records were reviewed. Historical:    - we reviewed the pathophysiology of hematopoiesis in general going over different etiologies of cytopenias. CBC/smear without morphologic abnormalities, nutritional studies -iron studies pending, B12 pending ,  zinc copper adequate. Viral serologies with increased hep a IgM -repeat from today pending. Autoimmune work-up negative. No evid of hemolysis. Plan to check flow and EPO at next apt.    - discussed role of BMBx and aspiration in workup; she wishes to forego at this time, which is reasonable. Would recommend if additional abn arise over time. Can monitor counts for now. All questions were asked and answered to the best of my ability. The patient verbalized understanding and agrees with the plan above.               [place] [facility] Hematology and Oncology  [address] [place] [address]  Office : [phone]  Fax : [phone]

## 2022-09-22 ENCOUNTER — HOSPITAL ENCOUNTER (OUTPATIENT)
Dept: LAB | Age: 82
Discharge: HOME | End: 2022-09-25
Payer: MEDICARE

## 2022-09-22 ENCOUNTER — OFFICE VISIT (OUTPATIENT)
Dept: ONCOLOGY | Age: 82
End: 2022-09-22
Payer: MEDICARE

## 2022-09-22 VITALS
TEMPERATURE: 99.1 F | OXYGEN SATURATION: 99 % | SYSTOLIC BLOOD PRESSURE: 124 MMHG | WEIGHT: 169.2 LBS | BODY MASS INDEX: 26.56 KG/M2 | RESPIRATION RATE: 14 BRPM | HEART RATE: 82 BPM | HEIGHT: 67 IN | DIASTOLIC BLOOD PRESSURE: 66 MMHG

## 2022-09-22 DIAGNOSIS — D64.9 ANEMIA, UNSPECIFIED TYPE: ICD-10-CM

## 2022-09-22 DIAGNOSIS — E55.9 VITAMIN D DEFICIENCY, UNSPECIFIED: ICD-10-CM

## 2022-09-22 DIAGNOSIS — D53.9 NUTRITIONAL ANEMIA, UNSPECIFIED: ICD-10-CM

## 2022-09-22 DIAGNOSIS — D53.9 ANEMIA ASSOCIATED WITH NUTRITIONAL DEFICIENCY: ICD-10-CM

## 2022-09-22 DIAGNOSIS — D64.9 ANEMIA, UNSPECIFIED TYPE: Primary | ICD-10-CM

## 2022-09-22 LAB
25(OH)D3 SERPL-MCNC: 27.8 NG/ML (ref 30–100)
ALBUMIN SERPL-MCNC: 3.6 G/DL (ref 3.2–4.6)
ALBUMIN/GLOB SERPL: 1 {RATIO} (ref 1.2–3.5)
ALP SERPL-CCNC: 96 U/L (ref 50–136)
ALT SERPL-CCNC: 21 U/L (ref 12–65)
ANION GAP SERPL CALC-SCNC: 4 MMOL/L (ref 4–13)
AST SERPL-CCNC: 15 U/L (ref 15–37)
BASOPHILS # BLD: 0.1 K/UL (ref 0–0.2)
BASOPHILS NFR BLD: 1 % (ref 0–2)
BILIRUB SERPL-MCNC: 0.3 MG/DL (ref 0.2–1.1)
BUN SERPL-MCNC: 20 MG/DL (ref 8–23)
CALCIUM SERPL-MCNC: 9 MG/DL (ref 8.3–10.4)
CHLORIDE SERPL-SCNC: 104 MMOL/L (ref 101–110)
CO2 SERPL-SCNC: 30 MMOL/L (ref 21–32)
CREAT SERPL-MCNC: 1.3 MG/DL (ref 0.6–1)
DIFFERENTIAL METHOD BLD: ABNORMAL
EOSINOPHIL # BLD: 0.3 K/UL (ref 0–0.8)
EOSINOPHIL NFR BLD: 5 % (ref 0.5–7.8)
ERYTHROCYTE [DISTWIDTH] IN BLOOD BY AUTOMATED COUNT: 12.1 % (ref 11.9–14.6)
FERRITIN SERPL-MCNC: 54 NG/ML (ref 8–388)
GLOBULIN SER CALC-MCNC: 3.5 G/DL (ref 2.3–3.5)
GLUCOSE SERPL-MCNC: 169 MG/DL (ref 65–100)
HCT VFR BLD AUTO: 38.1 % (ref 35.8–46.3)
HGB BLD-MCNC: 12.4 G/DL (ref 11.7–15.4)
IMM GRANULOCYTES # BLD AUTO: 0 K/UL (ref 0–0.5)
IMM GRANULOCYTES NFR BLD AUTO: 0 % (ref 0–5)
IRON SATN MFR SERPL: 34 %
IRON SERPL-MCNC: 101 UG/DL (ref 35–150)
LYMPHOCYTES # BLD: 2.2 K/UL (ref 0.5–4.6)
LYMPHOCYTES NFR BLD: 38 % (ref 13–44)
MCH RBC QN AUTO: 32 PG (ref 26.1–32.9)
MCHC RBC AUTO-ENTMCNC: 32.5 G/DL (ref 31.4–35)
MCV RBC AUTO: 98.4 FL (ref 79.6–97.8)
MONOCYTES # BLD: 0.4 K/UL (ref 0.1–1.3)
MONOCYTES NFR BLD: 8 % (ref 4–12)
NEUTS SEG # BLD: 2.8 K/UL (ref 1.7–8.2)
NEUTS SEG NFR BLD: 48 % (ref 43–78)
NRBC # BLD: 0 K/UL (ref 0–0.2)
PLATELET # BLD AUTO: 160 K/UL (ref 150–450)
PMV BLD AUTO: 11.7 FL (ref 9.4–12.3)
POTASSIUM SERPL-SCNC: 3.7 MMOL/L (ref 3.5–5.1)
PROT SERPL-MCNC: 7.1 G/DL (ref 6.3–8.2)
RBC # BLD AUTO: 3.87 M/UL (ref 4.05–5.2)
SODIUM SERPL-SCNC: 138 MMOL/L (ref 136–145)
TIBC SERPL-MCNC: 301 UG/DL (ref 250–450)
VIT B12 SERPL-MCNC: 498 PG/ML (ref 193–986)
WBC # BLD AUTO: 5.9 K/UL (ref 4.3–11.1)

## 2022-09-22 PROCEDURE — G8417 CALC BMI ABV UP PARAM F/U: HCPCS | Performed by: INTERNAL MEDICINE

## 2022-09-22 PROCEDURE — 1123F ACP DISCUSS/DSCN MKR DOCD: CPT | Performed by: INTERNAL MEDICINE

## 2022-09-22 PROCEDURE — 85025 COMPLETE CBC W/AUTO DIFF WBC: CPT

## 2022-09-22 PROCEDURE — G8427 DOCREV CUR MEDS BY ELIG CLIN: HCPCS | Performed by: INTERNAL MEDICINE

## 2022-09-22 PROCEDURE — 83540 ASSAY OF IRON: CPT

## 2022-09-22 PROCEDURE — 82668 ASSAY OF ERYTHROPOIETIN: CPT

## 2022-09-22 PROCEDURE — 80053 COMPREHEN METABOLIC PANEL: CPT

## 2022-09-22 PROCEDURE — 82306 VITAMIN D 25 HYDROXY: CPT

## 2022-09-22 PROCEDURE — 82607 VITAMIN B-12: CPT

## 2022-09-22 PROCEDURE — 99213 OFFICE O/P EST LOW 20 MIN: CPT | Performed by: INTERNAL MEDICINE

## 2022-09-22 PROCEDURE — 82728 ASSAY OF FERRITIN: CPT

## 2022-09-22 PROCEDURE — 1036F TOBACCO NON-USER: CPT | Performed by: INTERNAL MEDICINE

## 2022-09-22 PROCEDURE — 1090F PRES/ABSN URINE INCON ASSESS: CPT | Performed by: INTERNAL MEDICINE

## 2022-09-22 PROCEDURE — G8399 PT W/DXA RESULTS DOCUMENT: HCPCS | Performed by: INTERNAL MEDICINE

## 2022-09-22 PROCEDURE — 36415 COLL VENOUS BLD VENIPUNCTURE: CPT

## 2022-09-22 ASSESSMENT — PATIENT HEALTH QUESTIONNAIRE - PHQ9
SUM OF ALL RESPONSES TO PHQ QUESTIONS 1-9: 0
1. LITTLE INTEREST OR PLEASURE IN DOING THINGS: 0
SUM OF ALL RESPONSES TO PHQ9 QUESTIONS 1 & 2: 0
SUM OF ALL RESPONSES TO PHQ QUESTIONS 1-9: 0
2. FEELING DOWN, DEPRESSED OR HOPELESS: 0
SUM OF ALL RESPONSES TO PHQ QUESTIONS 1-9: 0
SUM OF ALL RESPONSES TO PHQ QUESTIONS 1-9: 0

## 2022-09-22 NOTE — PATIENT INSTRUCTIONS
(A) 0.6 - 1.0 MG/DL Final    GFR  09/22/2022 51 (A) >60 ml/min/1.73m2 Final    GFR Non- 09/22/2022 42 (A) >60 ml/min/1.73m2 Final    Comment:      Estimated GFR is calculated using the Modification of Diet in Renal Disease (MDRD) Study equation, reported for both  Americans (GFRAA) and non- Americans (GFRNA), and normalized to 1.73m2 body surface area. The physician must decide which value applies to the patient. The MDRD study equation should only be used in individuals age 25 or older. It has not been validated for the following: pregnant women, patients with serious comorbid conditions,or on certain medications, or persons with extremes of body size, muscle mass, or nutritional status. Calcium 09/22/2022 9.0  8.3 - 10.4 MG/DL Final    Total Bilirubin 09/22/2022 0.3  0.2 - 1.1 MG/DL Final    ALT 09/22/2022 21  12 - 65 U/L Final    AST 09/22/2022 15  15 - 37 U/L Final    Alk Phosphatase 09/22/2022 96  50 - 136 U/L Final    Total Protein 09/22/2022 7.1  6.3 - 8.2 g/dL Final    Albumin 09/22/2022 3.6  3.2 - 4.6 g/dL Final    Globulin 09/22/2022 3.5  2.3 - 3.5 g/dL Final    Albumin/Globulin Ratio 09/22/2022 1.0 (A) 1.2 - 3.5   Final    Ferritin 09/22/2022 54  8 - 388 NG/ML Final    Iron 09/22/2022 101  35 - 150 ug/dL Final    Comment: Known Interfering Substances section:  \"Iron values may be falsely elevated in  serum samples from patients with  anticoagulants (e.g., hemodialysis patients). \"  Limitations of Procedure section:  \"Turbidity resulting from precipitation of  fibrinogen in the serum of patients treated  with anticoagulants (e.g. hemodialysis  patients) may cause spuriously elevated  iron results. \"      TIBC 09/22/2022 301  250 - 450 ug/dL Final    TRANSFERRIN SATURATION 09/22/2022 34  >20 % Final        Treatment Summary has been discussed and given to patient:

## 2022-09-23 ENCOUNTER — TELEPHONE (OUTPATIENT)
Dept: ONCOLOGY | Age: 82
End: 2022-09-23

## 2022-09-23 LAB
EPO SERPL-ACNC: 12.6 MIU/ML (ref 2.6–18.5)
FLOW CYTOMETRY RESULTS: NORMAL
SPECIMEN SOURCE: NORMAL
TEST ORDERED: NORMAL

## 2022-09-23 NOTE — TELEPHONE ENCOUNTER
Left VM for pt letting her that Dr. Candelaria Giraldo reviewed labs. Vitamin D low - recommend starting 2,000 units daily. If already taking vitamin D supplement, can increase to 3,000 or 4,000 units daily. F/u with PCP and call back with any questions.

## 2022-09-29 ASSESSMENT — ENCOUNTER SYMPTOMS
WHEEZING: 0
BLOOD IN STOOL: 0
CHEST TIGHTNESS: 0
ABDOMINAL PAIN: 0
SORE THROAT: 0
SCLERAL ICTERUS: 0
DIARRHEA: 0
VOMITING: 0
VOICE CHANGE: 0
SHORTNESS OF BREATH: 0
HEMOPTYSIS: 0
ABDOMINAL DISTENTION: 0
CONSTIPATION: 0
TROUBLE SWALLOWING: 0
NAUSEA: 0

## 2022-10-18 ENCOUNTER — OFFICE VISIT (OUTPATIENT)
Dept: CARDIOLOGY CLINIC | Age: 82
End: 2022-10-18
Payer: MEDICARE

## 2022-10-18 VITALS
BODY MASS INDEX: 26.37 KG/M2 | DIASTOLIC BLOOD PRESSURE: 60 MMHG | HEIGHT: 67 IN | SYSTOLIC BLOOD PRESSURE: 136 MMHG | WEIGHT: 168 LBS | HEART RATE: 60 BPM

## 2022-10-18 DIAGNOSIS — I50.32 DIASTOLIC CHF, CHRONIC (HCC): ICD-10-CM

## 2022-10-18 DIAGNOSIS — I25.10 CORONARY ARTERY DISEASE INVOLVING NATIVE CORONARY ARTERY OF NATIVE HEART WITHOUT ANGINA PECTORIS: Primary | ICD-10-CM

## 2022-10-18 DIAGNOSIS — I65.23 BILATERAL CAROTID ARTERY STENOSIS: ICD-10-CM

## 2022-10-18 PROCEDURE — 99214 OFFICE O/P EST MOD 30 MIN: CPT | Performed by: INTERNAL MEDICINE

## 2022-10-18 PROCEDURE — 1123F ACP DISCUSS/DSCN MKR DOCD: CPT | Performed by: INTERNAL MEDICINE

## 2022-10-18 PROCEDURE — 1036F TOBACCO NON-USER: CPT | Performed by: INTERNAL MEDICINE

## 2022-10-18 PROCEDURE — G8427 DOCREV CUR MEDS BY ELIG CLIN: HCPCS | Performed by: INTERNAL MEDICINE

## 2022-10-18 PROCEDURE — 1090F PRES/ABSN URINE INCON ASSESS: CPT | Performed by: INTERNAL MEDICINE

## 2022-10-18 PROCEDURE — G8484 FLU IMMUNIZE NO ADMIN: HCPCS | Performed by: INTERNAL MEDICINE

## 2022-10-18 PROCEDURE — G8417 CALC BMI ABV UP PARAM F/U: HCPCS | Performed by: INTERNAL MEDICINE

## 2022-10-18 PROCEDURE — G8399 PT W/DXA RESULTS DOCUMENT: HCPCS | Performed by: INTERNAL MEDICINE

## 2022-10-18 RX ORDER — POTASSIUM CHLORIDE 750 MG/1
10 TABLET, EXTENDED RELEASE ORAL DAILY
Qty: 90 TABLET | Refills: 3 | Status: SHIPPED | OUTPATIENT
Start: 2022-10-18

## 2022-10-18 NOTE — PROGRESS NOTES
[address], [address], [address] [facility]  PHONE: [phone]     10/18/22    NAME:  Cecil Baldwin  : 1940  MRN: [id number]       SUBJECTIVE:   Cecil Baldwin is a [de-identified] y.o. female seen for a follow up visit regarding the following:     Chief Complaint   Patient presents with    Congestive Heart Failure    Coronary Artery Disease    Hyperlipidemia       HPI: Here today for follow up for CAD   PCI to Diag, echo showed normal EF  Carotid US:  3/16 and 2018: mild carotid Dz  [facility] 11/10/17: mild CAD, EF 60%. [facility] 2022:  mild CAD, EF normal, EDP 36     LOAIZA better on HCTZ, \"a lot better\". No edema. Patient denies recent history of orthopnea, PND, excessive dizziness and/or syncope. She has GLEN, cannot wear mask. Did not tolerate lipitor, crestor. Past Medical History, Past Surgical History, Family history, Social History, and Medications were all reviewed with the patient today and updated as necessary. Current Outpatient Medications   Medication Sig Dispense Refill    potassium chloride (KLOR-CON M) 10 MEQ extended release tablet Take 1 tablet by mouth daily 90 tablet 3    hydroCHLOROthiazide (HYDRODIURIL) 25 MG tablet Take 1 tablet by mouth every morning 90 tablet 3    Multiple Vitamins-Minerals (MULTIVITAMIN ADULTS 50+) TABS Take 1 tablet by mouth daily      FLUoxetine HCl, PMDD, 20 MG TABS Take 20 mg by mouth daily 90 tablet 1    simvastatin (ZOCOR) 20 MG tablet Take 1 tablet by mouth nightly 90 tablet 1    albuterol sulfate  (90 Base) MCG/ACT inhaler Inhale 1 puff into the lungs every 6 hours as needed      aspirin 81 MG EC tablet Take by mouth daily       No current facility-administered medications for this visit.         Allergies   Allergen Reactions    Atorvastatin Other (See Comments)    Codeine Hives    Gabapentin Hives and Itching    Lisinopril Other (See Comments)     Ace cough    Pravastatin Other (See Comments)    Sulfa Antibiotics Other (See Comments)     Pt takes Celebrex without problems    Sulfur Hives    Esomeprazole Rash     All over rash and itching     Rosuvastatin Rash     Patient Active Problem List    Diagnosis Date Noted    Accelerating angina ([facility] [address] 08/03/2022     Priority: High     Added automatically from request for surgery [id number]      Diastolic CHF, chronic ([address] 09/09/2022     Priority: Medium    Chronic renal disease, stage III [facility]) [060794] 07/26/2022     Priority: Medium    Type 2 diabetes mellitus with chronic kidney disease ([address] 01/18/2022    Coronary artery disease involving native coronary artery of native heart with angina pectoris ([address] 09/10/2021    Macrocytosis 05/17/2021    Anemia 05/17/2021    Cognitive disorder 03/11/2021    Type 2 diabetes with nephropathy ([facility] [address] 10/17/2018    Acute pain of right knee 10/25/2017    Right calf pain 10/25/2017    Primary osteoarthritis of both knees 12/21/2016    Carotid artery disease ([address] 03/24/2016    Esophageal reflux     Major depressive disorder, recurrent episode, mild (HCC)     Dyspnea      Shortness of breath        Precordial chest pain     Coronary artery disease involving native coronary artery without angina pectoris 02/04/2016     PCI 2/16: Diag PCI with ZOIE        Post PTCA 02/04/2016    Diverticulitis large intestine     Diverticulitis large intestine w/o perforation or abscess w/o bleeding 10/26/2015    Lower abdominal pain 10/26/2015    Esophageal dysphagia 10/26/2015    ACE inhibitor intolerance      cough with lisinopril        Type II diabetes mellitus, well controlled ([address] 06/26/2015    Gastritis due to nonsteroidal anti-inflammatory drug (NSAID) 06/26/2015    Hx of bilateral mastectomy     Postmenopausal atrophic vaginitis     Symptomatic menopausal or female climacteric states     Hypertonicity of bladder     Mixed hyperlipidemia     Insomnia, unspecified     Arthritis     Generalized anxiety disorder     Allergic rhinitis 11/10/2014    Asthma 11/10/2014      Past Surgical History:   Procedure Laterality Date    APPENDECTOMY      BACK SURGERY      BLADDER SUSPENSION      CARDIAC PROCEDURE N/A 8/12/2022    Left heart cath / coronary angiography performed by Ravi Quintero MD at [address] [facility] CATH LAB    CHOLECYSTECTOMY      COLONOSCOPY  10/26/2011    COLONOSCOPY  2017    wnl    COLONOSCOPY      HYSTERECTOMY (CERVIX STATUS UNKNOWN)      MASTECTOMY Bilateral 1989    Bilateral, breast cancer, no chemotherapy, no radiation    MASTECTOMY, RADICAL Bilateral     ORTHOPEDIC SURGERY  1990s    Fusion of disc x 2    NV CARDIAC SURG PROCEDURE UNLIST  2017    heart cath -- no additional stents    NV CARDIAC SURG PROCEDURE UNLIST  2016    1 stent placed    TOTAL ABDOM HYSTERECTOMY      TOTAL COLECTOMY       Family History   Problem Relation Age of Onset    Coronary Art Dis Brother     Other Brother         SCD    Other Sister         PCI's    Heart Disease Other         Ischemic Heart Disease    Diabetes Son         NON-INSULIN DEPENDENT    Heart Disease Other         2 MIs    Lung Disease Brother     Kidney Disease Brother     Cancer Sister         BREAST    Hypertension Other     Other Other         No family history of pulmonary concerns    Deep Vein Thrombosis Mother     Coronary Art Dis Father 48     Social History     Tobacco Use    Smoking status: Never    Smokeless tobacco: Never    Tobacco comments:     Quit smoking: Secondhand smoke exposure for 16 years from her  cigarettes   Substance Use Topics    Alcohol use: Never         ROS:    No obvious pertinent positives on review of systems except for what was outlined in the HPI today.       PHYSICAL EXAM:     /60   Pulse 60   Ht 5' 7\" (1.702 m)   Wt 168 lb (76.2 kg)   BMI 26.31 kg/m²    General/Constitutional:   Alert and oriented x 3, no acute distress  HEENT:   normocephalic, atraumatic, moist mucous membranes  Neck:   No JVD or carotid bruits bilaterally  Cardiovascular:   regular rate and rhythm, no murmur/rub/gallop appreciated  Pulmonary:   clear to auscultation bilaterally, no respiratory distress  Abdomen:   soft, non-tender, non-distended  Ext:   No sig LE edema bilaterally  Skin:  warm and dry, no obvious rashes seen  Neuro:   no obvious sensory or motor deficits  Psychiatric:   normal mood and affect      Lab Results   Component Value Date/Time     09/22/2022 03:03 PM    K 3.7 09/22/2022 03:03 PM     09/22/2022 03:03 PM    CO2 30 09/22/2022 03:03 PM    BUN 20 09/22/2022 03:03 PM    CREATININE 1.30 09/22/2022 03:03 PM    GLUCOSE 169 09/22/2022 03:03 PM    CALCIUM 9.0 09/22/2022 03:03 PM        Lab Results   Component Value Date    WBC 5.9 09/22/2022    HGB 12.4 09/22/2022    HCT 38.1 09/22/2022    MCV 98.4 (H) 09/22/2022     09/22/2022       Lab Results   Component Value Date    TSH 2.210 01/26/2022       Lab Results   Component Value Date    LABA1C 6.7 (H) 01/26/2022     Lab Results   Component Value Date     01/26/2022       Lab Results   Component Value Date    CHOL 161 07/26/2022    CHOL 214 (H) 04/26/2022    CHOL 140 01/26/2022     Lab Results   Component Value Date    TRIG 165 (H) 07/26/2022    TRIG 181 (H) 04/26/2022    TRIG 102 01/26/2022     Lab Results   Component Value Date    HDL 65 (H) 07/26/2022    HDL 56 04/26/2022    HDL 70 (H) 01/26/2022     Lab Results   Component Value Date    LDLCALC 63 07/26/2022    LDLCALC 126 (H) 04/26/2022    LDLCALC 49.6 01/26/2022     Lab Results   Component Value Date    LABVLDL 33 (H) 07/26/2022    LABVLDL 20.4 01/26/2022    LABVLDL 21 06/09/2020    VLDL 32 04/26/2022    VLDL 37 10/26/2021    VLDL 16 04/01/2021     Lab Results   Component Value Date    CHOLHDLRATIO 2.5 07/26/2022    CHOLHDLRATIO 2.0 01/26/2022           I have Independently reviewed prior care notes, any ER records available, cardiac testing, labs and results with the patient and before seeing the patient today.   Also independently reviewed outside records when available. ASSESSMENT:    Jordyn Mendez was seen today for congestive heart failure, coronary artery disease and hyperlipidemia. Diagnoses and all orders for this visit:    Coronary artery disease involving native coronary artery of native heart without angina pectoris    Diastolic CHF, chronic (HCC)    Bilateral carotid artery stenosis    Other orders  -     potassium chloride (KLOR-CON M) 10 MEQ extended release tablet; Take 1 tablet by mouth daily        PLAN:     1. CAD:    On ASA and zocor. Feeling better on HCTZ, add KCL 10 daily. 2. Carotid Dz:  Remain on ASA and statin, no more testing. 3. HPL:  Reviewed repatha or other med options, she declined in the past.      4. HypoK:  add KCL 10 daily. Follow. Patient has been instructed and agrees to call our office with any issues or other concerns related to their cardiac condition(s) and/or complaint(s). Return in about 6 months (around 4/18/2023).        RUSTY MERCHANT,   10/18/2022

## 2022-10-25 ENCOUNTER — OFFICE VISIT (OUTPATIENT)
Dept: FAMILY MEDICINE CLINIC | Facility: CLINIC | Age: 82
End: 2022-10-25
Payer: MEDICARE

## 2022-10-25 VITALS
HEART RATE: 73 BPM | WEIGHT: 172 LBS | OXYGEN SATURATION: 99 % | TEMPERATURE: 98.3 F | BODY MASS INDEX: 27 KG/M2 | HEIGHT: 67 IN | DIASTOLIC BLOOD PRESSURE: 60 MMHG | SYSTOLIC BLOOD PRESSURE: 120 MMHG

## 2022-10-25 DIAGNOSIS — R73.09 ELEVATED GLUCOSE: ICD-10-CM

## 2022-10-25 DIAGNOSIS — I77.9 CAROTID ARTERY DISEASE, UNSPECIFIED LATERALITY, UNSPECIFIED TYPE (HCC): ICD-10-CM

## 2022-10-25 DIAGNOSIS — Z23 ENCOUNTER FOR IMMUNIZATION: Primary | ICD-10-CM

## 2022-10-25 DIAGNOSIS — J30.1 ALLERGIC RHINITIS DUE TO POLLEN, UNSPECIFIED SEASONALITY: ICD-10-CM

## 2022-10-25 DIAGNOSIS — Z78.0 POSTMENOPAUSAL: ICD-10-CM

## 2022-10-25 DIAGNOSIS — R79.89 ELEVATED SERUM CREATININE: ICD-10-CM

## 2022-10-25 DIAGNOSIS — E11.21 TYPE 2 DIABETES MELLITUS WITH DIABETIC NEPHROPATHY, WITHOUT LONG-TERM CURRENT USE OF INSULIN (HCC): ICD-10-CM

## 2022-10-25 LAB
ALBUMIN SERPL-MCNC: 3.7 G/DL (ref 3.2–4.6)
ALBUMIN/GLOB SERPL: 1 {RATIO} (ref 0.4–1.6)
ALP SERPL-CCNC: 76 U/L (ref 50–136)
ALT SERPL-CCNC: 22 U/L (ref 12–65)
ANION GAP SERPL CALC-SCNC: 4 MMOL/L (ref 2–11)
AST SERPL-CCNC: 20 U/L (ref 15–37)
BILIRUB SERPL-MCNC: 0.4 MG/DL (ref 0.2–1.1)
BUN SERPL-MCNC: 21 MG/DL (ref 8–23)
CALCIUM SERPL-MCNC: 9.3 MG/DL (ref 8.3–10.4)
CHLORIDE SERPL-SCNC: 105 MMOL/L (ref 101–110)
CO2 SERPL-SCNC: 29 MMOL/L (ref 21–32)
CREAT SERPL-MCNC: 1.1 MG/DL (ref 0.6–1)
GLOBULIN SER CALC-MCNC: 3.8 G/DL (ref 2.8–4.5)
GLUCOSE SERPL-MCNC: 90 MG/DL (ref 65–100)
HBA1C MFR BLD: 6.5 %
POTASSIUM SERPL-SCNC: 5.1 MMOL/L (ref 3.5–5.1)
PROT SERPL-MCNC: 7.5 G/DL (ref 6.3–8.2)
SODIUM SERPL-SCNC: 138 MMOL/L (ref 133–143)

## 2022-10-25 PROCEDURE — G8484 FLU IMMUNIZE NO ADMIN: HCPCS | Performed by: NURSE PRACTITIONER

## 2022-10-25 PROCEDURE — 83036 HEMOGLOBIN GLYCOSYLATED A1C: CPT | Performed by: NURSE PRACTITIONER

## 2022-10-25 PROCEDURE — G0439 PPPS, SUBSEQ VISIT: HCPCS | Performed by: NURSE PRACTITIONER

## 2022-10-25 PROCEDURE — G0008 ADMIN INFLUENZA VIRUS VAC: HCPCS | Performed by: NURSE PRACTITIONER

## 2022-10-25 PROCEDURE — 3044F HG A1C LEVEL LT 7.0%: CPT | Performed by: NURSE PRACTITIONER

## 2022-10-25 PROCEDURE — 90694 VACC AIIV4 NO PRSRV 0.5ML IM: CPT | Performed by: NURSE PRACTITIONER

## 2022-10-25 PROCEDURE — 1123F ACP DISCUSS/DSCN MKR DOCD: CPT | Performed by: NURSE PRACTITIONER

## 2022-10-25 RX ORDER — THIAMINE MONONITRATE (VIT B1) 100 MG
100 TABLET ORAL DAILY
COMMUNITY

## 2022-10-25 RX ORDER — FLUOXETINE 20 MG/1
20 TABLET ORAL DAILY
Qty: 90 TABLET | Refills: 1 | Status: SHIPPED | OUTPATIENT
Start: 2022-10-25

## 2022-10-25 RX ORDER — SIMVASTATIN 20 MG
20 TABLET ORAL NIGHTLY
Qty: 90 TABLET | Refills: 1 | Status: SHIPPED | OUTPATIENT
Start: 2022-10-25

## 2022-10-25 RX ORDER — MONTELUKAST SODIUM 10 MG/1
10 TABLET ORAL DAILY
Qty: 30 TABLET | Refills: 3 | Status: SHIPPED | OUTPATIENT
Start: 2022-10-25

## 2022-10-25 RX ORDER — ALBUTEROL SULFATE 90 UG/1
1 AEROSOL, METERED RESPIRATORY (INHALATION) EVERY 6 HOURS PRN
Qty: 18 G | Refills: 0 | Status: SHIPPED | OUTPATIENT
Start: 2022-10-25

## 2022-10-25 RX ORDER — MULTIVIT-MIN/IRON/FOLIC ACID/K 18-600-40
1 CAPSULE ORAL DAILY
COMMUNITY

## 2022-10-25 ASSESSMENT — PATIENT HEALTH QUESTIONNAIRE - PHQ9
5. POOR APPETITE OR OVEREATING: 0
6. FEELING BAD ABOUT YOURSELF - OR THAT YOU ARE A FAILURE OR HAVE LET YOURSELF OR YOUR FAMILY DOWN: 0
SUM OF ALL RESPONSES TO PHQ QUESTIONS 1-9: 0
10. IF YOU CHECKED OFF ANY PROBLEMS, HOW DIFFICULT HAVE THESE PROBLEMS MADE IT FOR YOU TO DO YOUR WORK, TAKE CARE OF THINGS AT HOME, OR GET ALONG WITH OTHER PEOPLE: 0
2. FEELING DOWN, DEPRESSED OR HOPELESS: 0
SUM OF ALL RESPONSES TO PHQ9 QUESTIONS 1 & 2: 0
3. TROUBLE FALLING OR STAYING ASLEEP: 0
4. FEELING TIRED OR HAVING LITTLE ENERGY: 0
8. MOVING OR SPEAKING SO SLOWLY THAT OTHER PEOPLE COULD HAVE NOTICED. OR THE OPPOSITE, BEING SO FIGETY OR RESTLESS THAT YOU HAVE BEEN MOVING AROUND A LOT MORE THAN USUAL: 0
SUM OF ALL RESPONSES TO PHQ QUESTIONS 1-9: 0
1. LITTLE INTEREST OR PLEASURE IN DOING THINGS: 0
7. TROUBLE CONCENTRATING ON THINGS, SUCH AS READING THE NEWSPAPER OR WATCHING TELEVISION: 0
9. THOUGHTS THAT YOU WOULD BE BETTER OFF DEAD, OR OF HURTING YOURSELF: 0

## 2022-10-25 ASSESSMENT — LIFESTYLE VARIABLES
HOW OFTEN DO YOU HAVE A DRINK CONTAINING ALCOHOL: NEVER
HOW MANY STANDARD DRINKS CONTAINING ALCOHOL DO YOU HAVE ON A TYPICAL DAY: PATIENT DOES NOT DRINK

## 2022-10-25 NOTE — PROGRESS NOTES
[address]  [address] [facility]  [place], [address] [facility]   (ph) [phone] (fax) [phone]  Mary BACA, FNP-C     Medicare Annual Wellness Visit    Sarah Lay is here for Medicare AWV and Medication Refill    Assessment & Plan   Encounter for immunization  -     Influenza, FLUAD, (age 72 y+), IM, Preservative Free, 0.5 mL    Recommendations for Preventive Services Due: see orders and patient instructions/AVS.  Recommended screening schedule for the next 5-10 years is provided to the patient in written form: see Patient Instructions/AVS.     1. Medicare annual wellness visit, subsequent    2. Screening for Depression  PHQ9=0    3. Screening for alcoholism  Pt denies drinking    4. Screening immunizations  Immunizations UTD. 5. Screening for Hep C  10/23/18- negative    6. Screening for colon cancer  Colonoscopy 2017-reports due again in 2027  We will confirm this    7. Screening for breast cancer  Mammogram-reports that she does not do them anymore. Double mastectomy 33 years ago    8. Screening for cervical cancer  Hysterectomy years ago    5. Postmenopausal  Dexa scan 01/2020; due again and ordered    10. Routine eye exam  Pt scheduling appt. Reviewed health care maintenance. All appropriate screenings ordered or discussed/offered that are recommended or due. Information provided on Advanced Directive,Living Will and POA with AVS. Pt to return for next Medicare Wellness in 1 year. Return for Medicare Annual Wellness Visit in 1 year. Patient's complete Health Risk Assessment and screening values have been reviewed and are found in Flowsheets. The following problems were reviewed today and where indicated follow up appointments were made and/or referrals ordered. Positive Risk Factor Screenings with Interventions:     Cognitive:   Words recalled: 0 Words Recalled  Clock Drawing Test (CDT): Normal  Total Score Interpretation: Abnormal Mini-Cog  Cognitive Impairment Interventions:  Reports memory is fine and refuses further work-up. Reports done that before           General Health and ACP:  General  In general, how would you say your health is?: Very Good  In the past 7 days, have you experienced any of the following: New or Increased Pain, New or Increased Fatigue, Loneliness, Social Isolation, Stress or Anger?: No  Do you get the social and emotional support that you need?: Yes  Do you have a Living Will?: (!) No    Advance Directives       Power of [facility] Will ACP-Advance Directive ACP-Power of     Not on File Not on File Not on File Not on File          General Health Risk Interventions:        Safety:  Do you have working smoke detectors?: Yes  Do you have any tripping hazards - loose or unsecured carpets or rugs?: No  Do you have any tripping hazards - clutter in doorways, halls, or stairs?: No  Do you have either shower bars, grab bars, non-slip mats or non-slip surfaces in your shower or bathtub?: (!) No  Do all of your stairways have a railing or banister?: Yes  Do you always fasten your seatbelt when you are in a car?: Yes  Safety Interventions:  Home safety tips provided   Pt lives with daughter and son-in-law; great relationship        Objective   Vitals:    10/25/22 0848   BP: 120/60   Site: Right Upper Arm   Position: Sitting   Cuff Size: Medium Adult   Pulse: 73   Temp: 98.3 °F (36.8 °C)   TempSrc: Oral   SpO2: 99%   Weight: 172 lb (78 kg)   Height: 5' 7\" (1.702 m)      Body mass index is 26.94 kg/m².              Allergies   Allergen Reactions    Atorvastatin Other (See Comments)    Codeine Hives    Gabapentin Hives and Itching    Lisinopril Other (See Comments)     Ace cough    Pravastatin Other (See Comments)    Sulfa Antibiotics Other (See Comments)     Pt takes Celebrex without problems    Sulfur Hives    Esomeprazole Rash     All over rash and itching     Rosuvastatin Rash     Prior to Visit Medications    Medication Sig Taking?  Authorizing Provider   Cholecalciferol (VITAMIN D) 50 MCG (2000 UT) CAPS capsule Take 1 capsule by mouth daily Yes Historical Provider, MD   vitamin B-1 (THIAMINE) 100 MG tablet Take 100 mg by mouth daily Yes Historical Provider, MD   FLUoxetine HCl, PMDD, 20 MG TABS Take 20 mg by mouth daily Yes KEVEN Macario CNP   simvastatin (ZOCOR) 20 MG tablet Take 1 tablet by mouth nightly Yes KEVEN Macario CNP   albuterol sulfate HFA (PROVENTIL;VENTOLIN;PROAIR) 108 (90 Base) MCG/ACT inhaler Inhale 1 puff into the lungs every 6 hours as needed for Shortness of Breath Yes KEVEN Macario CNP   potassium chloride (KLOR-CON M) 10 MEQ extended release tablet Take 1 tablet by mouth daily Yes Allie Ozuna, DO   hydroCHLOROthiazide (HYDRODIURIL) 25 MG tablet Take 1 tablet by mouth every morning Yes Allie Ozuna, DO   Multiple Vitamins-Minerals (MULTIVITAMIN ADULTS 50+) TABS Take 1 tablet by mouth daily Yes Historical Provider, MD   aspirin 81 MG EC tablet Take by mouth daily Yes Ar Automatic Reconciliation       CareTeam (Including outside providers/suppliers regularly involved in providing care):   Patient Care Team:  KEVEN Crespo CNP as PCP - [address], APRN - CNP as PCP - [facility] Empaneled Provider  KEVEN Almodovar NP as Nurse Practitioner  Daryl Bazzi MD as Physician     Reviewed and updated this visit:  Tobacco  Allergies  Meds  Med Hx  Surg Hx  Soc Hx  Fam Hx

## 2022-10-25 NOTE — PATIENT INSTRUCTIONS
Personalized Preventive Plan for Sarahi Mireles - 10/25/2022  Medicare offers a range of preventive health benefits. Some of the tests and screenings are paid in full while other may be subject to a deductible, co-insurance, and/or copay. Some of these benefits include a comprehensive review of your medical history including lifestyle, illnesses that may run in your family, and various assessments and screenings as appropriate. After reviewing your medical record and screening and assessments performed today your provider may have ordered immunizations, labs, imaging, and/or referrals for you. A list of these orders (if applicable) as well as your Preventive Care list are included within your After Visit Summary for your review. Other Preventive Recommendations:    A preventive eye exam performed by an eye specialist is recommended every 1-2 years to screen for glaucoma; cataracts, macular degeneration, and other eye disorders. A preventive dental visit is recommended every 6 months. Try to get at least 150 minutes of exercise per week or 10,000 steps per day on a pedometer . Order or download the FREE \"Exercise & Physical Activity: Your Everyday Guide\" from [employer]. Call 5-509.888.6165 or search [employer] online. You need 2802-8574 mg of calcium and 3377-9588 IU of vitamin D per day. It is possible to meet your calcium requirement with diet alone, but a vitamin D supplement is usually necessary to meet this goal.  When exposed to the sun, use a sunscreen that protects against both UVA and UVB radiation with an SPF of 30 or greater. Reapply every 2 to 3 hours or after sweating, drying off with a towel, or swimming. Always wear a seat belt when traveling in a car. Always wear a helmet when riding a bicycle or motorcycle.

## 2022-11-04 ENCOUNTER — OFFICE VISIT (OUTPATIENT)
Dept: FAMILY MEDICINE CLINIC | Facility: CLINIC | Age: 82
End: 2022-11-04
Payer: MEDICARE

## 2022-11-04 VITALS
BODY MASS INDEX: 26.94 KG/M2 | HEIGHT: 67 IN | HEART RATE: 102 BPM | SYSTOLIC BLOOD PRESSURE: 116 MMHG | OXYGEN SATURATION: 96 % | DIASTOLIC BLOOD PRESSURE: 72 MMHG | TEMPERATURE: 99.6 F

## 2022-11-04 DIAGNOSIS — J01.90 ACUTE SINUSITIS, RECURRENCE NOT SPECIFIED, UNSPECIFIED LOCATION: Primary | ICD-10-CM

## 2022-11-04 PROCEDURE — G8399 PT W/DXA RESULTS DOCUMENT: HCPCS | Performed by: FAMILY MEDICINE

## 2022-11-04 PROCEDURE — 99213 OFFICE O/P EST LOW 20 MIN: CPT | Performed by: FAMILY MEDICINE

## 2022-11-04 PROCEDURE — 1090F PRES/ABSN URINE INCON ASSESS: CPT | Performed by: FAMILY MEDICINE

## 2022-11-04 PROCEDURE — G8484 FLU IMMUNIZE NO ADMIN: HCPCS | Performed by: FAMILY MEDICINE

## 2022-11-04 PROCEDURE — 1036F TOBACCO NON-USER: CPT | Performed by: FAMILY MEDICINE

## 2022-11-04 PROCEDURE — G8417 CALC BMI ABV UP PARAM F/U: HCPCS | Performed by: FAMILY MEDICINE

## 2022-11-04 PROCEDURE — 1123F ACP DISCUSS/DSCN MKR DOCD: CPT | Performed by: FAMILY MEDICINE

## 2022-11-04 PROCEDURE — G8428 CUR MEDS NOT DOCUMENT: HCPCS | Performed by: FAMILY MEDICINE

## 2022-11-04 RX ORDER — AMOXICILLIN 500 MG/1
1000 CAPSULE ORAL 2 TIMES DAILY
Qty: 28 CAPSULE | Refills: 0 | Status: SHIPPED | OUTPATIENT
Start: 2022-11-04 | End: 2022-11-11

## 2022-11-04 RX ORDER — BENZONATATE 100 MG/1
100 CAPSULE ORAL 3 TIMES DAILY PRN
Qty: 30 CAPSULE | Refills: 0 | Status: SHIPPED | OUTPATIENT
Start: 2022-11-04 | End: 2022-11-22 | Stop reason: SDUPTHER

## 2022-11-04 ASSESSMENT — PATIENT HEALTH QUESTIONNAIRE - PHQ9
SUM OF ALL RESPONSES TO PHQ QUESTIONS 1-9: 0
1. LITTLE INTEREST OR PLEASURE IN DOING THINGS: 0
SUM OF ALL RESPONSES TO PHQ QUESTIONS 1-9: 0
SUM OF ALL RESPONSES TO PHQ9 QUESTIONS 1 & 2: 0
2. FEELING DOWN, DEPRESSED OR HOPELESS: 0
SUM OF ALL RESPONSES TO PHQ QUESTIONS 1-9: 0
SUM OF ALL RESPONSES TO PHQ QUESTIONS 1-9: 0

## 2022-11-04 ASSESSMENT — ENCOUNTER SYMPTOMS
CHEST TIGHTNESS: 0
ABDOMINAL PAIN: 0
SHORTNESS OF BREATH: 0
WHEEZING: 0

## 2022-11-04 NOTE — PROGRESS NOTES
China Felipe is a [de-identified] y.o. female who presents today for the following:  Chief Complaint   Patient presents with    Cough     productive       Allergies   Allergen Reactions    Atorvastatin Other (See Comments)    Codeine Hives    Gabapentin Hives and Itching    Lisinopril Other (See Comments)     Ace cough    Pravastatin Other (See Comments)    Sulfa Antibiotics Other (See Comments)     Pt takes Celebrex without problems    Sulfur Hives    Esomeprazole Rash     All over rash and itching     Rosuvastatin Rash       Current Outpatient Medications   Medication Sig Dispense Refill    Cholecalciferol (VITAMIN D) 50 MCG (2000 UT) CAPS capsule Take 1 capsule by mouth daily      vitamin B-1 (THIAMINE) 100 MG tablet Take 100 mg by mouth daily      FLUoxetine HCl, PMDD, 20 MG TABS Take 20 mg by mouth daily 90 tablet 1    simvastatin (ZOCOR) 20 MG tablet Take 1 tablet by mouth nightly 90 tablet 1    albuterol sulfate HFA (PROVENTIL;VENTOLIN;PROAIR) 108 (90 Base) MCG/ACT inhaler Inhale 1 puff into the lungs every 6 hours as needed for Shortness of Breath 18 g 0    montelukast (SINGULAIR) 10 MG tablet Take 1 tablet by mouth daily 30 tablet 3    potassium chloride (KLOR-CON M) 10 MEQ extended release tablet Take 1 tablet by mouth daily 90 tablet 3    hydroCHLOROthiazide (HYDRODIURIL) 25 MG tablet Take 1 tablet by mouth every morning 90 tablet 3    Multiple Vitamins-Minerals (MULTIVITAMIN ADULTS 50+) TABS Take 1 tablet by mouth daily      aspirin 81 MG EC tablet Take by mouth daily       No current facility-administered medications for this visit.        Past Medical History:   Diagnosis Date    Abnormal EKG     ACE inhibitor intolerance 2014    cough with lisinopril    Allergic rhinitis 11/10/2014    Allergic rhinitis, cause unspecified     Arthritis     Asthma 11/10/2014    Backache, unspecified     CAD (coronary artery disease) 2/4/2016    Cancer ([facility] [address]     breast    Chronic renal disease, stage III ([address] [033278] 7/26/2022 Continuous leakage(788.37)     Depressive disorder, not elsewhere classified     Diverticulitis large intestine     Diverticulitis large intestine w/o perforation or abscess w/o bleeding 10/26/2015    Diverticulitis of colon (without mention of hemorrhage)(562.11)     Dyslipidemia     Dyspnea     Shortness of breath    Encounter for long-term (current) use of aspirin     Esophageal dysphagia 10/26/2015    Esophageal reflux     Essential hypertension     External hemorrhoids without mention of complication     Gastritis due to nonsteroidal anti-inflammatory drug (NSAID) 6/26/2015    Generalized anxiety disorder     GERD (gastroesophageal reflux disease)     Heart disease, unspecified     HLD (hyperlipidemia)     HTN (hypertension), malignant 2/4/2016    Hx of bilateral mastectomy     Hx of hysterectomy     Hypertension 6/26/2015    Hypertonicity of bladder     Insomnia, unspecified     Lower abdominal pain 10/26/2015    LVH (left ventricular hypertrophy)     L or R Cardiomegaly    Major depressive disorder, recurrent episode, mild (HCC)     Mixed hyperlipidemia     Normal cardiac stress test 2/2012    normal stress echo with Ef 60-65% with Dr Cira Leon PTCA 2/4/2016    Postmenopausal atrophic vaginitis     Precordial chest pain     Reflux 10/26/2015    Sleep apnea     does not sleep with cpap    Symptomatic menopausal or female climacteric states     Thromboembolus [facility]) 1970's    legs    Type II diabetes mellitus, well controlled ([facility] [address] 6/26/2015    Type II or unspecified type diabetes mellitus with ophthalmic manifestations, not stated as uncontrolled(250.50)     Type II or unspecified type diabetes mellitus without mention of complication, uncontrolled     Urinary tract infection, site not specified        Past Surgical History:   Procedure Laterality Date    APPENDECTOMY      BACK SURGERY      BLADDER SUSPENSION      CARDIAC PROCEDURE N/A 8/12/2022    Left heart cath / coronary angiography performed by Brigida Robison SpO2 96%   BMI 26.94 kg/m²     Physical Exam  Constitutional:       General: She is not in acute distress. Appearance: Normal appearance. She is not ill-appearing. HENT:      Nose: Congestion and rhinorrhea present. Mouth/Throat:      Pharynx: Posterior oropharyngeal erythema present. No oropharyngeal exudate. Eyes:      Comments: Eyes injected   Cardiovascular:      Heart sounds: Normal heart sounds. Pulmonary:      Effort: Pulmonary effort is normal.      Breath sounds: Normal breath sounds. Musculoskeletal:      Cervical back: Neck supple. Neurological:      General: No focal deficit present. Mental Status: She is oriented to person, place, and time. 1. Acute sinusitis, recurrence not specified, unspecified location  Discussed with patient that symptoms are most likely viral upper respiratory infection. No fever or body aches. Symptoms for 4 days. Prescribed amoxicillin since get frequent sinus infections. Can start antibiotic if symptoms are not resolving after a week. -     amoxicillin (AMOXIL) 500 MG capsule; Take 2 capsules by mouth 2 times daily for 7 days, Disp-28 capsule, R-0Normal  -     benzonatate (TESSALON) 100 MG capsule; Take 1 capsule by mouth 3 times daily as needed for Cough, Disp-30 capsule, R-0Normal     Patient informed, we will call with blood work results within one week. If you have not heard regarding results in over a week, please contact office. You can also review results on [employer].            Davian Beatty MD

## 2022-11-22 ENCOUNTER — TELEMEDICINE (OUTPATIENT)
Dept: FAMILY MEDICINE CLINIC | Facility: CLINIC | Age: 82
End: 2022-11-22
Payer: MEDICARE

## 2022-11-22 DIAGNOSIS — J06.9 VIRAL URI: ICD-10-CM

## 2022-11-22 DIAGNOSIS — R05.1 ACUTE COUGH: ICD-10-CM

## 2022-11-22 DIAGNOSIS — U07.1 COVID-19: ICD-10-CM

## 2022-11-22 DIAGNOSIS — J00 ACUTE RHINITIS: Primary | ICD-10-CM

## 2022-11-22 PROCEDURE — 1123F ACP DISCUSS/DSCN MKR DOCD: CPT | Performed by: NURSE PRACTITIONER

## 2022-11-22 PROCEDURE — 1090F PRES/ABSN URINE INCON ASSESS: CPT | Performed by: NURSE PRACTITIONER

## 2022-11-22 PROCEDURE — G8427 DOCREV CUR MEDS BY ELIG CLIN: HCPCS | Performed by: NURSE PRACTITIONER

## 2022-11-22 PROCEDURE — 99213 OFFICE O/P EST LOW 20 MIN: CPT | Performed by: NURSE PRACTITIONER

## 2022-11-22 PROCEDURE — G8399 PT W/DXA RESULTS DOCUMENT: HCPCS | Performed by: NURSE PRACTITIONER

## 2022-11-22 RX ORDER — FLUTICASONE PROPIONATE 50 MCG
2 SPRAY, SUSPENSION (ML) NASAL DAILY
Qty: 16 G | Refills: 1 | Status: SHIPPED | OUTPATIENT
Start: 2022-11-22

## 2022-11-22 RX ORDER — GUAIFENESIN 600 MG/1
600 TABLET, EXTENDED RELEASE ORAL 2 TIMES DAILY
Qty: 30 TABLET | Refills: 0 | Status: SHIPPED | OUTPATIENT
Start: 2022-11-22 | End: 2022-12-07

## 2022-11-22 RX ORDER — BENZONATATE 100 MG/1
100 CAPSULE ORAL 3 TIMES DAILY PRN
Qty: 30 CAPSULE | Refills: 0 | Status: SHIPPED | OUTPATIENT
Start: 2022-11-22 | End: 2022-12-02

## 2022-11-22 ASSESSMENT — PATIENT HEALTH QUESTIONNAIRE - PHQ9
SUM OF ALL RESPONSES TO PHQ QUESTIONS 1-9: 6
9. THOUGHTS THAT YOU WOULD BE BETTER OFF DEAD, OR OF HURTING YOURSELF: 0
2. FEELING DOWN, DEPRESSED OR HOPELESS: 0
3. TROUBLE FALLING OR STAYING ASLEEP: 0
5. POOR APPETITE OR OVEREATING: 3
SUM OF ALL RESPONSES TO PHQ QUESTIONS 1-9: 6
7. TROUBLE CONCENTRATING ON THINGS, SUCH AS READING THE NEWSPAPER OR WATCHING TELEVISION: 0
SUM OF ALL RESPONSES TO PHQ QUESTIONS 1-9: 6
6. FEELING BAD ABOUT YOURSELF - OR THAT YOU ARE A FAILURE OR HAVE LET YOURSELF OR YOUR FAMILY DOWN: 0
8. MOVING OR SPEAKING SO SLOWLY THAT OTHER PEOPLE COULD HAVE NOTICED. OR THE OPPOSITE, BEING SO FIGETY OR RESTLESS THAT YOU HAVE BEEN MOVING AROUND A LOT MORE THAN USUAL: 0
10. IF YOU CHECKED OFF ANY PROBLEMS, HOW DIFFICULT HAVE THESE PROBLEMS MADE IT FOR YOU TO DO YOUR WORK, TAKE CARE OF THINGS AT HOME, OR GET ALONG WITH OTHER PEOPLE: 0
SUM OF ALL RESPONSES TO PHQ9 QUESTIONS 1 & 2: 0
4. FEELING TIRED OR HAVING LITTLE ENERGY: 3
1. LITTLE INTEREST OR PLEASURE IN DOING THINGS: 0
SUM OF ALL RESPONSES TO PHQ QUESTIONS 1-9: 6

## 2022-11-22 ASSESSMENT — ENCOUNTER SYMPTOMS
DIARRHEA: 0
NAUSEA: 0
SINUS PRESSURE: 0
WHEEZING: 0
COUGH: 1
SHORTNESS OF BREATH: 0
VOMITING: 0
SINUS PAIN: 1
SORE THROAT: 1

## 2022-11-22 NOTE — PROGRESS NOTES
2022    TELEHEALTH EVALUATION -- Audio/Visual (During LWNVL-65 public health emergency)    HPI:    Ventura Lomax (:  1940) has requested an audio/video accompanied by her daughter Sailaja Machado for evaluation for the following concern(s):  TPONU-08 tested positive this morning. Symptoms started day before yesterday () and had the flu last week. Saw Dr. Lauren Sosa last week and had symptom management. Fever started last night and today. Spitting up a lot of stuff from drainage back of throat. Round clear cough pills helps. Drinking gatorade, water, and juice. Not eating much. Mucinex and Ines Pinon cold medicine helps some. Positives:  sinus pain since last week, cough, myalgias, fever, and weakness. Denies: Chest pain, heart palpitations, leg swelling, fatigue, chills, nausea, vomiting, diarrhea, dizziness, headaches, or decreased urinary output. Review of Systems   Constitutional:  Positive for fever. Negative for chills and fatigue. HENT:  Positive for sinus pain and sore throat. Negative for congestion, ear pain and sinus pressure. Since last week. Respiratory:  Positive for cough. Negative for shortness of breath and wheezing. Cardiovascular:  Negative for chest pain, palpitations and leg swelling. Chest pains from the coughing. Gastrointestinal:  Negative for diarrhea, nausea and vomiting. Genitourinary:  Negative for difficulty urinating. Musculoskeletal:  Positive for myalgias. Neurological:  Positive for weakness. Negative for dizziness and headaches. Prior to Visit Medications    Medication Sig Taking?  Authorizing Provider   fluticasone (FLONASE) 50 MCG/ACT nasal spray 2 sprays by Each Nostril route daily Yes KEVEN Lombardi CNP   guaiFENesin (MUCINEX) 600 MG extended release tablet Take 1 tablet by mouth 2 times daily for 15 days Yes KEVEN Lombardi CNP   benzonatate (TESSALON) 100 MG capsule Take 1 capsule by mouth 3 times daily as needed for Cough Yes Darnelle Hammans, APRN - CNP   Cholecalciferol (VITAMIN D) 50 MCG (2000 UT) CAPS capsule Take 1 capsule by mouth daily Yes Historical Provider, MD   vitamin B-1 (THIAMINE) 100 MG tablet Take 100 mg by mouth daily Yes Historical Provider, MD   FLUoxetine HCl, PMDD, 20 MG TABS Take 20 mg by mouth daily Yes KEVEN Macario CNP   simvastatin (ZOCOR) 20 MG tablet Take 1 tablet by mouth nightly Yes KEVEN Macario CNP   albuterol sulfate HFA (PROVENTIL;VENTOLIN;PROAIR) 108 (90 Base) MCG/ACT inhaler Inhale 1 puff into the lungs every 6 hours as needed for Shortness of Breath Yes KEVEN Macario CNP   montelukast (SINGULAIR) 10 MG tablet Take 1 tablet by mouth daily Yes KEVEN Macario CNP   potassium chloride (KLOR-CON M) 10 MEQ extended release tablet Take 1 tablet by mouth daily Yes Vladimir Gonzalez, DO   hydroCHLOROthiazide (HYDRODIURIL) 25 MG tablet Take 1 tablet by mouth every morning Yes Vladimir Gonzalez, DO   Multiple Vitamins-Minerals (MULTIVITAMIN ADULTS 50+) TABS Take 1 tablet by mouth daily Yes Historical Provider, MD   aspirin 81 MG EC tablet Take by mouth daily Yes Ar Automatic Reconciliation       Social History     Tobacco Use    Smoking status: Never    Smokeless tobacco: Never    Tobacco comments:     Quit smoking: Secondhand smoke exposure for 16 years from her  cigarettes   Substance Use Topics    Alcohol use: Never    Drug use: No        Allergies   Allergen Reactions    Atorvastatin Other (See Comments)    Codeine Hives    Gabapentin Hives and Itching    Lisinopril Other (See Comments)     Ace cough    Pravastatin Other (See Comments)    Sulfa Antibiotics Other (See Comments)     Pt takes Celebrex without problems    Sulfur Hives    Esomeprazole Rash     All over rash and itching     Rosuvastatin Rash   ,   Past Medical History:   Diagnosis Date    Abnormal EKG     ACE inhibitor intolerance 2014    cough with lisinopril    Allergic rhinitis 11/10/2014    Allergic rhinitis, cause unspecified     Arthritis     Asthma 11/10/2014    Backache, unspecified     CAD (coronary artery disease) 2/4/2016    Cancer [facility])     breast    Chronic renal disease, stage III ([facility] [150180] 7/26/2022    Continuous leakage(788.37)     Depressive disorder, not elsewhere classified     Diverticulitis large intestine     Diverticulitis large intestine w/o perforation or abscess w/o bleeding 10/26/2015    Diverticulitis of colon (without mention of hemorrhage)(562.11)     Dyslipidemia     Dyspnea     Shortness of breath    Encounter for long-term (current) use of aspirin     Esophageal dysphagia 10/26/2015    Esophageal reflux     Essential hypertension     External hemorrhoids without mention of complication     Gastritis due to nonsteroidal anti-inflammatory drug (NSAID) 6/26/2015    Generalized anxiety disorder     GERD (gastroesophageal reflux disease)     Heart disease, unspecified     HLD (hyperlipidemia)     HTN (hypertension), malignant 2/4/2016    Hx of bilateral mastectomy     Hx of hysterectomy     Hypertension 6/26/2015    Hypertonicity of bladder     Insomnia, unspecified     Lower abdominal pain 10/26/2015    LVH (left ventricular hypertrophy)     L or R Cardiomegaly    Major depressive disorder, recurrent episode, mild (HCC)     Mixed hyperlipidemia     Normal cardiac stress test 2/2012    normal stress echo with Ef 60-65% with Dr Cira Leon PTCA 2/4/2016    Postmenopausal atrophic vaginitis     Precordial chest pain     Reflux 10/26/2015    Sleep apnea     does not sleep with cpap    Symptomatic menopausal or female climacteric states     Thromboembolus [facility]) 1970's    legs    Type II diabetes mellitus, well controlled ([facility] 6/26/2015    Type II or unspecified type diabetes mellitus with ophthalmic manifestations, not stated as uncontrolled(250.50)     Type II or unspecified type diabetes mellitus without mention of complication, uncontrolled     Urinary tract infection, site not specified    ,   Past Surgical History:   Procedure Laterality Date    APPENDECTOMY      BACK SURGERY      BLADDER SUSPENSION      CARDIAC PROCEDURE N/A 8/12/2022    Left heart cath / coronary angiography performed by Brian Minor MD at [facility] CATH LAB    CHOLECYSTECTOMY      COLONOSCOPY  10/26/2011    COLONOSCOPY  2017    wnl    COLONOSCOPY      HYSTERECTOMY (CERVIX STATUS UNKNOWN)      MASTECTOMY Bilateral 1989    Bilateral, breast cancer, no chemotherapy, no radiation    MASTECTOMY, RADICAL Bilateral     ORTHOPEDIC SURGERY  1990s    Fusion of disc x 2    NC CARDIAC SURG PROCEDURE UNLIST  2017    heart cath -- no additional stents    NC CARDIAC SURG PROCEDURE UNLIST  2016    1 stent placed    TOTAL ABDOM HYSTERECTOMY      TOTAL COLECTOMY     ,   Social History     Tobacco Use    Smoking status: Never    Smokeless tobacco: Never    Tobacco comments:     Quit smoking: Secondhand smoke exposure for 16 years from her  cigarettes   Substance Use Topics    Alcohol use: Never    Drug use: No   ,   Family History   Problem Relation Age of Onset    Coronary Art Dis Brother     Other Brother         SCD    Other Sister         PCI's    Heart Disease Other         Ischemic Heart Disease    Diabetes Son         NON-INSULIN DEPENDENT    Heart Disease Other         2 MIs    Lung Disease Brother     Kidney Disease Brother     Cancer Sister         BREAST    Hypertension Other     Other Other         No family history of pulmonary concerns    Deep Vein Thrombosis Mother     Coronary Art Dis Father 48   ,   Immunization History   Administered Date(s) Administered    COVID-19, MODERNA BLUE border, Primary or Immunocompromised, (age 12y+), IM, 100 mcg/0.5mL 04/26/2021, 05/24/2021    COVID-19, MODERNA Booster BLUE border, (age 18y+), IM, 50mcg/0.25mL 11/16/2021    Influenza Virus Vaccine 10/29/2010, 09/13/2011    Influenza, FLUAD, (age 72 y+), Adjuvanted, 0.5mL 10/25/2022    Influenza, High Dose (Fluzone 65 yrs and older) 10/16/2012, 09/30/2013, 10/26/2015, 10/27/2016, 12/05/2017, 08/30/2018, 01/09/2019, 10/16/2019, 09/15/2020, 09/15/2020    Pneumococcal Conjugate 13-valent Ray Bares) 04/14/2017    Pneumococcal Polysaccharide ([id number]) 12/21/2019    Pneumococcal Vaccine 01/01/2012, 01/01/2013, 03/26/2014    Td (Adult), 2 Lf Tetanus Toxoid, Pf (Td, Absorbed) 11/02/2018    Td, Adsorbed, Pf, Adult Use, Lf Unspecified  11/02/2018    Zoster Live (Zostavax) 03/31/2017    Zoster Recombinant (Shingrix) 10/16/2019, 12/21/2019   ,   Health Maintenance   Topic Date Due    DTaP/Tdap/Td vaccine (1 - Tdap) 11/03/2018    COVID-19 Vaccine (4 - Booster for Moderna series) 01/11/2022    Lipids  07/26/2023    Annual Wellness Visit (AWV)  10/26/2023    Depression Monitoring  11/04/2023    DEXA (modify frequency per FRAX score)  Completed    Flu vaccine  Completed    Shingles vaccine  Completed    Pneumococcal 65+ years Vaccine  Completed    Hepatitis A vaccine  Aged Out    Hib vaccine  Aged Out    Meningococcal (ACWY) vaccine  Aged Out       PHYSICAL EXAMINATION:  [ INSTRUCTIONS:  \"[x]\" Indicates a positive item  \"[]\" Indicates a negative item  -- DELETE ALL ITEMS NOT EXAMINED]  Vital Signs: (As obtained by patient/caregiver or practitioner observation)    Blood pressure-  Heart rate-    Respiratory rate-    Temperature-  Pulse oximetry-     Constitutional: [x] Appears well-developed and well-nourished [x] No apparent distress      [] Abnormal-   Mental status  [x] Alert and awake  [x] Oriented to person/place/time [x]Able to follow commands      Eyes:  EOM    [x]  Normal  [] Abnormal-  Sclera  [x]  Normal  [] Abnormal -         Discharge [x]  None visible  [] Abnormal -    HENT:   [x] Normocephalic, atraumatic.   [] Abnormal   [] Mouth/Throat: Mucous membranes are moist.     External Ears [x] Normal  [] Abnormal-     Neck: [x] No visualized mass     Pulmonary/Chest: [x] Respiratory effort normal.  [x] No visualized signs of difficulty breathing or respiratory distress        [] Abnormal-      Musculoskeletal:   [] Normal gait with no signs of ataxia         [x] Normal range of motion of neck        [] Abnormal-       Neurological:        [x] No Facial Asymmetry (Cranial nerve 7 motor function) (limited exam to video visit)          [] No gaze palsy        [] Abnormal-         Skin:        [x] No significant exanthematous lesions or discoloration noted on facial skin         [] Abnormal-            Psychiatric:       [x] Normal Affect [x] No Hallucinations        [] Abnormal-     Other pertinent observable physical exam findings-     ASSESSMENT/PLAN:  1. Acute rhinitis    - fluticasone (FLONASE) 50 MCG/ACT nasal spray; 2 sprays by Each Nostril route daily  Dispense: 16 g; Refill: 1    2. Viral URI    - guaiFENesin (MUCINEX) 600 MG extended release tablet; Take 1 tablet by mouth 2 times daily for 15 days  Dispense: 30 tablet; Refill: 0  - benzonatate (TESSALON) 100 MG capsule; Take 1 capsule by mouth 3 times daily as needed for Cough  Dispense: 30 capsule; Refill: 0    3. Acute cough    - guaiFENesin (MUCINEX) 600 MG extended release tablet; Take 1 tablet by mouth 2 times daily for 15 days  Dispense: 30 tablet; Refill: 0  - benzonatate (TESSALON) 100 MG capsule; Take 1 capsule by mouth 3 times daily as needed for Cough  Dispense: 30 capsule; Refill: 0    4. COVID-19  Patient and daughter declined COVID antiviral treatment at this time. Discussed with patient and daughter importance of staying well-hydrated 1 to 2 L of water daily, consider vitamin C sources, and continue symptom management Tylenol for fever, saline flushes, warm showers and moist compresses. Discussed with patient and daughter report to the emergency room for confusion, worsening symptoms, or worsening weakness. No follow-ups on file.     Cecil Baldwin, was evaluated through a synchronous (real-time) audio-video encounter. The patient (or guardian if applicable) is aware that this is a billable service, which includes applicable co-pays. This Virtual Visit was conducted with patient's (and/or legal guardian's) consent. The visit was conducted pursuant to the emergency declaration under the [facility], [address] [place] [address] authority and the [employer] and [employer] General Act. Patient identification was verified, and a caregiver was present when appropriate. The patient was located at Home: [address] [place] [id number]. Provider was located at [facility] ([address] [place] [address]): [address]. Total time spent on this encounter:  20 minutes    --KEVEN Hyde CNP on 11/22/2022 at 11:03 AM    An electronic signature was used to authenticate this note.

## 2022-12-07 RX ORDER — ALBUTEROL SULFATE 90 UG/1
1 AEROSOL, METERED RESPIRATORY (INHALATION) EVERY 6 HOURS PRN
Qty: 18 G | Refills: 0 | Status: SHIPPED | OUTPATIENT
Start: 2022-12-07

## 2023-02-23 ENCOUNTER — HOSPITAL ENCOUNTER (OUTPATIENT)
Dept: GENERAL RADIOLOGY | Age: 83
Discharge: HOME | End: 2023-02-23
Payer: MEDICARE

## 2023-02-23 ENCOUNTER — TELEPHONE (OUTPATIENT)
Dept: FAMILY MEDICINE CLINIC | Facility: CLINIC | Age: 83
End: 2023-02-23

## 2023-02-23 ENCOUNTER — OFFICE VISIT (OUTPATIENT)
Dept: FAMILY MEDICINE CLINIC | Facility: CLINIC | Age: 83
End: 2023-02-23

## 2023-02-23 VITALS
HEIGHT: 67 IN | HEART RATE: 60 BPM | DIASTOLIC BLOOD PRESSURE: 68 MMHG | WEIGHT: 172.2 LBS | BODY MASS INDEX: 27.03 KG/M2 | OXYGEN SATURATION: 97 % | SYSTOLIC BLOOD PRESSURE: 122 MMHG | TEMPERATURE: 98.6 F

## 2023-02-23 DIAGNOSIS — K21.9 GASTROESOPHAGEAL REFLUX DISEASE WITHOUT ESOPHAGITIS: ICD-10-CM

## 2023-02-23 DIAGNOSIS — R06.02 SOB (SHORTNESS OF BREATH): ICD-10-CM

## 2023-02-23 DIAGNOSIS — E78.2 MIXED HYPERLIPIDEMIA: ICD-10-CM

## 2023-02-23 DIAGNOSIS — G89.29 CHRONIC PAIN OF RIGHT KNEE: ICD-10-CM

## 2023-02-23 DIAGNOSIS — R05.9 COUGH, UNSPECIFIED TYPE: ICD-10-CM

## 2023-02-23 DIAGNOSIS — R79.89 POSITIVE D-DIMER: Primary | ICD-10-CM

## 2023-02-23 DIAGNOSIS — M25.561 CHRONIC PAIN OF RIGHT KNEE: ICD-10-CM

## 2023-02-23 DIAGNOSIS — D75.89 MACROCYTIC: ICD-10-CM

## 2023-02-23 DIAGNOSIS — F32.A DEPRESSION, UNSPECIFIED DEPRESSION TYPE: ICD-10-CM

## 2023-02-23 DIAGNOSIS — E55.9 VITAMIN D DEFICIENCY: ICD-10-CM

## 2023-02-23 DIAGNOSIS — I10 PRIMARY HYPERTENSION: ICD-10-CM

## 2023-02-23 DIAGNOSIS — E11.9 TYPE II DIABETES MELLITUS, WELL CONTROLLED (HCC): ICD-10-CM

## 2023-02-23 DIAGNOSIS — E11.22 TYPE 2 DIABETES MELLITUS WITH CHRONIC KIDNEY DISEASE, WITHOUT LONG-TERM CURRENT USE OF INSULIN, UNSPECIFIED CKD STAGE (HCC): Primary | ICD-10-CM

## 2023-02-23 DIAGNOSIS — J00 ACUTE RHINITIS: ICD-10-CM

## 2023-02-23 PROBLEM — I25.119 CORONARY ARTERY DISEASE INVOLVING NATIVE CORONARY ARTERY OF NATIVE HEART WITH ANGINA PECTORIS (HCC): Status: RESOLVED | Noted: 2021-09-10 | Resolved: 2023-02-23

## 2023-02-23 PROBLEM — U07.1 COVID-19: Status: RESOLVED | Noted: 2022-11-22 | Resolved: 2023-02-23

## 2023-02-23 PROBLEM — M79.661 RIGHT CALF PAIN: Status: RESOLVED | Noted: 2017-10-25 | Resolved: 2023-02-23

## 2023-02-23 PROBLEM — I20.0 ACCELERATING ANGINA (HCC): Status: RESOLVED | Noted: 2022-08-03 | Resolved: 2023-02-23

## 2023-02-23 LAB
25(OH)D3 SERPL-MCNC: 21.7 NG/ML (ref 30–100)
ALBUMIN SERPL-MCNC: 3.7 G/DL (ref 3.2–4.6)
ALBUMIN/GLOB SERPL: 0.9 (ref 0.4–1.6)
ALP SERPL-CCNC: 93 U/L (ref 50–136)
ALT SERPL-CCNC: 28 U/L (ref 12–65)
ANION GAP SERPL CALC-SCNC: 4 MMOL/L (ref 2–11)
AST SERPL-CCNC: 26 U/L (ref 15–37)
BASOPHILS # BLD: 0.1 K/UL (ref 0–0.2)
BASOPHILS NFR BLD: 1 % (ref 0–2)
BILIRUB SERPL-MCNC: 0.5 MG/DL (ref 0.2–1.1)
BUN SERPL-MCNC: 21 MG/DL (ref 8–23)
CALCIUM SERPL-MCNC: 9.8 MG/DL (ref 8.3–10.4)
CHLORIDE SERPL-SCNC: 105 MMOL/L (ref 101–110)
CHOLEST SERPL-MCNC: 173 MG/DL
CK SERPL-CCNC: 92 U/L (ref 21–215)
CO2 SERPL-SCNC: 27 MMOL/L (ref 21–32)
CREAT SERPL-MCNC: 1.3 MG/DL (ref 0.6–1)
D DIMER PPP FEU-MCNC: 0.56 UG/ML(FEU)
DIFFERENTIAL METHOD BLD: ABNORMAL
EOSINOPHIL # BLD: 0.3 K/UL (ref 0–0.8)
EOSINOPHIL NFR BLD: 5 % (ref 0.5–7.8)
ERYTHROCYTE [DISTWIDTH] IN BLOOD BY AUTOMATED COUNT: 12.8 % (ref 11.9–14.6)
GLOBULIN SER CALC-MCNC: 4.1 G/DL (ref 2.8–4.5)
GLUCOSE SERPL-MCNC: 125 MG/DL (ref 65–100)
HBA1C MFR BLD: 6.2 %
HCT VFR BLD AUTO: 41.7 % (ref 35.8–46.3)
HDLC SERPL-MCNC: 70 MG/DL (ref 40–60)
HDLC SERPL: 2.5
HGB BLD-MCNC: 12.9 G/DL (ref 11.7–15.4)
IMM GRANULOCYTES # BLD AUTO: 0 K/UL (ref 0–0.5)
IMM GRANULOCYTES NFR BLD AUTO: 0 % (ref 0–5)
LDLC SERPL CALC-MCNC: 72.8 MG/DL
LYMPHOCYTES # BLD: 2.3 K/UL (ref 0.5–4.6)
LYMPHOCYTES NFR BLD: 41 % (ref 13–44)
MCH RBC QN AUTO: 31.6 PG (ref 26.1–32.9)
MCHC RBC AUTO-ENTMCNC: 30.9 G/DL (ref 31.4–35)
MCV RBC AUTO: 102.2 FL (ref 82–102)
MONOCYTES # BLD: 0.4 K/UL (ref 0.1–1.3)
MONOCYTES NFR BLD: 8 % (ref 4–12)
NEUTS SEG # BLD: 2.5 K/UL (ref 1.7–8.2)
NEUTS SEG NFR BLD: 45 % (ref 43–78)
NRBC # BLD: 0 K/UL (ref 0–0.2)
NT PRO BNP: 361 PG/ML
PLATELET # BLD AUTO: 164 K/UL (ref 150–450)
PMV BLD AUTO: 13.3 FL (ref 9.4–12.3)
POTASSIUM SERPL-SCNC: 4.3 MMOL/L (ref 3.5–5.1)
PROT SERPL-MCNC: 7.8 G/DL (ref 6.3–8.2)
RBC # BLD AUTO: 4.08 M/UL (ref 4.05–5.2)
SODIUM SERPL-SCNC: 136 MMOL/L (ref 133–143)
TRIGL SERPL-MCNC: 151 MG/DL (ref 35–150)
VLDLC SERPL CALC-MCNC: 30.2 MG/DL (ref 6–23)
WBC # BLD AUTO: 5.6 K/UL (ref 4.3–11.1)

## 2023-02-23 PROCEDURE — 71046 X-RAY EXAM CHEST 2 VIEWS: CPT

## 2023-02-23 PROCEDURE — 73560 X-RAY EXAM OF KNEE 1 OR 2: CPT

## 2023-02-23 RX ORDER — FLUOXETINE 20 MG/1
20 TABLET ORAL DAILY
Qty: 90 TABLET | Refills: 1 | Status: SHIPPED | OUTPATIENT
Start: 2023-02-23

## 2023-02-23 RX ORDER — FAMOTIDINE 20 MG/1
20 TABLET, FILM COATED ORAL 2 TIMES DAILY
Qty: 60 TABLET | Refills: 2 | Status: SHIPPED | OUTPATIENT
Start: 2023-02-23

## 2023-02-23 RX ORDER — SIMVASTATIN 20 MG
20 TABLET ORAL NIGHTLY
Qty: 90 TABLET | Refills: 1 | Status: SHIPPED | OUTPATIENT
Start: 2023-02-23

## 2023-02-23 RX ORDER — ALBUTEROL SULFATE 90 UG/1
1 AEROSOL, METERED RESPIRATORY (INHALATION) EVERY 6 HOURS PRN
Qty: 18 G | Refills: 0 | Status: SHIPPED | OUTPATIENT
Start: 2023-02-23

## 2023-02-23 RX ORDER — FLUTICASONE PROPIONATE 50 MCG
2 SPRAY, SUSPENSION (ML) NASAL DAILY
Qty: 16 G | Refills: 1 | Status: SHIPPED | OUTPATIENT
Start: 2023-02-23

## 2023-02-23 SDOH — ECONOMIC STABILITY: INCOME INSECURITY: HOW HARD IS IT FOR YOU TO PAY FOR THE VERY BASICS LIKE FOOD, HOUSING, MEDICAL CARE, AND HEATING?: NOT VERY HARD

## 2023-02-23 SDOH — ECONOMIC STABILITY: FOOD INSECURITY: WITHIN THE PAST 12 MONTHS, THE FOOD YOU BOUGHT JUST DIDN'T LAST AND YOU DIDN'T HAVE MONEY TO GET MORE.: NEVER TRUE

## 2023-02-23 SDOH — ECONOMIC STABILITY: HOUSING INSECURITY
IN THE LAST 12 MONTHS, WAS THERE A TIME WHEN YOU DID NOT HAVE A STEADY PLACE TO SLEEP OR SLEPT IN A SHELTER (INCLUDING NOW)?: NO

## 2023-02-23 SDOH — ECONOMIC STABILITY: FOOD INSECURITY: WITHIN THE PAST 12 MONTHS, YOU WORRIED THAT YOUR FOOD WOULD RUN OUT BEFORE YOU GOT MONEY TO BUY MORE.: NEVER TRUE

## 2023-02-23 ASSESSMENT — PATIENT HEALTH QUESTIONNAIRE - PHQ9
SUM OF ALL RESPONSES TO PHQ QUESTIONS 1-9: 2
SUM OF ALL RESPONSES TO PHQ9 QUESTIONS 1 & 2: 2
SUM OF ALL RESPONSES TO PHQ QUESTIONS 1-9: 2
SUM OF ALL RESPONSES TO PHQ QUESTIONS 1-9: 2
2. FEELING DOWN, DEPRESSED OR HOPELESS: 1
SUM OF ALL RESPONSES TO PHQ QUESTIONS 1-9: 2
1. LITTLE INTEREST OR PLEASURE IN DOING THINGS: 1

## 2023-02-23 ASSESSMENT — ENCOUNTER SYMPTOMS
EYES NEGATIVE: 1
COUGH: 1
ALLERGIC/IMMUNOLOGIC NEGATIVE: 1
SHORTNESS OF BREATH: 1

## 2023-02-23 NOTE — RESULT ENCOUNTER NOTE
Results to patient please. Chest x-ray showed her heart  is slightly enlarged; however, it was prominent on last chest x-ray back in January 2022 as well. Her knee showed osteoarthritis. We will await CT report to make sure she does not have a PE.    Thanks

## 2023-02-23 NOTE — PROGRESS NOTES
[address]  [address] [facility] Claire Carrero, [address] [facility]   (ph) [phone] (fax) [phone]  Mary BACA, FNP-C      Chief Complaint   Patient presents with    Follow-up     4 month fu    Medication Refill       [de-identified] yo female comes in as a follow-up to chronic conditions. She refuses mammograms and reports her oncologist said that she does not need anymore mammograms due to breast implants. She is still having back pain but ortho told her that it is arthritis  and to continue Celebrex. She stopped the Celebrex as well . She reports feeling better than she has in years. Pt  stopped her  Metformin. Her Cr+ was slightly elevated and she reports that sugars have been good. She is under care of nephrology. She still sees Dr. Severo Knack for the anemia. She has not had follow-up regarding her MRI ordered by Dr. Cordelia Glover last year and refuses to go back due to him trying to take her license per pt. She does report a cough. She is on Singulair and OTC antihistamines for drainage but has had no relief. She does report untreated GERD at times. She also has a hx of CHF and is under cardiology. She denies edema but has had a slight weight increase. She reports being  more SOB when walking to mail box for last few weeks. Medication Refill  Associated symptoms include coughing.      Allergies   Allergen Reactions    Atorvastatin Other (See Comments)    Codeine Hives    Gabapentin Hives and Itching    Lisinopril Other (See Comments)     Ace cough    Pravastatin Other (See Comments)    Sulfa Antibiotics Other (See Comments)     Pt takes Celebrex without problems    Sulfur Hives    Esomeprazole Rash     All over rash and itching     Rosuvastatin Rash       Past Medical History:   Diagnosis Date    Abnormal EKG     ACE inhibitor intolerance 2014    cough with lisinopril    Allergic rhinitis 11/10/2014    Allergic rhinitis, cause unspecified     Arthritis     Asthma 11/10/2014 Backache, unspecified     CAD (coronary artery disease) 2/4/2016    Cancer [facility])     breast    Chronic renal disease, stage III ([facility] [address] [744498] 7/26/2022    Continuous leakage(788.37)     Depressive disorder, not elsewhere classified     Diverticulitis large intestine     Diverticulitis large intestine w/o perforation or abscess w/o bleeding 10/26/2015    Diverticulitis of colon (without mention of hemorrhage)(562.11)     Dyslipidemia     Dyspnea     Shortness of breath    Encounter for long-term (current) use of aspirin     Esophageal dysphagia 10/26/2015    Esophageal reflux     Essential hypertension     External hemorrhoids without mention of complication     Gastritis due to nonsteroidal anti-inflammatory drug (NSAID) 6/26/2015    Generalized anxiety disorder     GERD (gastroesophageal reflux disease)     Heart disease, unspecified     HLD (hyperlipidemia)     HTN (hypertension), malignant 2/4/2016    Hx of bilateral mastectomy     Hx of hysterectomy     Hypertension 6/26/2015    Hypertonicity of bladder     Insomnia, unspecified     Lower abdominal pain 10/26/2015    LVH (left ventricular hypertrophy)     L or R Cardiomegaly    Major depressive disorder, recurrent episode, mild (HCC)     Mixed hyperlipidemia     Normal cardiac stress test 2/2012    normal stress echo with Ef 60-65% with Dr Bailee Pathak [facility] 2/4/2016    Postmenopausal atrophic vaginitis     Precordial chest pain     Reflux 10/26/2015    Sleep apnea     does not sleep with cpap    Symptomatic menopausal or female climacteric states     Thromboembolus [facility]) 1970's    legs    Type II diabetes mellitus, well controlled ([facility] [address] 6/26/2015    Type II or unspecified type diabetes mellitus with ophthalmic manifestations, not stated as uncontrolled(250.50)     Type II or unspecified type diabetes mellitus without mention of complication, uncontrolled     Urinary tract infection, site not specified        Family History   Problem Relation Age of Onset Coronary Art Dis Brother     Other Brother         SCD    Other Sister         PCI's    Heart Disease Other         Ischemic Heart Disease    Diabetes Son         NON-INSULIN DEPENDENT    Heart Disease Other         2 MIs    Lung Disease Brother     Kidney Disease Brother     Cancer Sister         BREAST    Hypertension Other     Other Other         No family history of pulmonary concerns    Deep Vein Thrombosis Mother     Coronary Art Dis Father 48       Social History     Socioeconomic History    Marital status:      Spouse name: Not on file    Number of children: Not on file    Years of education: Not on file    Highest education level: Not on file   Occupational History    Not on file   Tobacco Use    Smoking status: Never    Smokeless tobacco: Never    Tobacco comments:     Quit smoking: Secondhand smoke exposure for 16 years from her  cigarettes   Substance and Sexual Activity    Alcohol use: Never    Drug use: No    Sexual activity: Not on file   Other Topics Concern    Not on file   Social History Narrative    Lifelong nonsmoker with 16 year secondhand smoke exposure history. Worked as a  at [employer], denies industrial toxin exposure history. , 3 children who are healthy. Denies alcohol use. Has always lived in [place]. No pets, no history of pet bird. Social Determinants of Health     Financial Resource Strain: Low Risk     Difficulty of Paying Living Expenses: Not very hard   Food Insecurity: No Food Insecurity    Worried About Running Out of Food in the Last Year: Never true    Ran Out of Food in the Last Year: Never true   Transportation Needs: No Transportation Needs    Lack of Transportation (Medical): No    Lack of Transportation (Non-Medical):  No   Physical Activity: Sufficiently Active    Days of Exercise per Week: 7 days    Minutes of Exercise per Session: 30 min   Stress: Not on file   Social Connections: Not on file   Intimate Partner Violence: Not on file   Housing Stability: Unknown    Unable to Pay for Housing in the Last Year: Not on file    Number of Places Lived in the Last Year: Not on file    Unstable Housing in the Last Year: No       OB History    No obstetric history on file.          Past Surgical History:   Procedure Laterality Date    APPENDECTOMY      BACK SURGERY      BLADDER SUSPENSION      CARDIAC PROCEDURE N/A 8/12/2022    Left heart cath / coronary angiography performed by Navya Bolanos MD at [address] [place] [address],[address]      COLONOSCOPY  10/26/2011    COLONOSCOPY  2017    wnl    COLONOSCOPY      HYSTERECTOMY (CERVIX STATUS UNKNOWN)      MASTECTOMY Bilateral 1989    Bilateral, breast cancer, no chemotherapy, no radiation    MASTECTOMY, RADICAL Bilateral     ORTHOPEDIC SURGERY  1990s    Fusion of disc x 2    [place] UNLISTED PROCEDURE CARDIAC SURGERY  2017    heart cath -- no additional stents    [place] UNLISTED PROCEDURE CARDIAC SURGERY  2016    1 stent placed    TOTAL ABDOM [address]   Topic Date Due    DTaP/Tdap/Td vaccine (1 - Tdap) 11/03/2018    COVID-19 Vaccine (4 - Booster for Moderna series) 01/11/2022    Annual Wellness Visit (AWV)  10/26/2023    Lipids  02/23/2024    Depression Monitoring  02/23/2024    DEXA (modify frequency per FRAX score)  Completed    Flu vaccine  Completed    Shingles vaccine  Completed    Pneumococcal 65+ years Vaccine  Completed    Hepatitis A vaccine  Aged Out    Hib vaccine  Aged Out    Meningococcal (ACWY) vaccine  Aged Out         Current Outpatient Medications:     albuterol sulfate HFA (PROVENTIL;VENTOLIN;PROAIR) 108 (90 Base) MCG/ACT inhaler, Inhale 1 puff into the lungs every 6 hours as needed for Shortness of Breath, Disp: 18 g, Rfl: 0    FLUoxetine HCl, PMDD, 20 MG TABS, Take 20 mg by mouth daily, Disp: 90 tablet, Rfl: 1    fluticasone (FLONASE) 50 MCG/ACT nasal spray, 2 sprays by Each Nostril route daily, Disp: 16 g, Rfl: 1 simvastatin (ZOCOR) 20 MG tablet, Take 1 tablet by mouth nightly, Disp: 90 tablet, Rfl: 1    famotidine (PEPCID) 20 MG tablet, Take 1 tablet by mouth 2 times daily, Disp: 60 tablet, Rfl: 2    Cholecalciferol (VITAMIN D) 50 MCG (2000 UT) CAPS capsule, Take 1 capsule by mouth daily, Disp: , Rfl:     vitamin B-1 (THIAMINE) 100 MG tablet, Take 100 mg by mouth daily, Disp: , Rfl:     potassium chloride (KLOR-CON M) 10 MEQ extended release tablet, Take 1 tablet by mouth daily, Disp: 90 tablet, Rfl: 3    hydroCHLOROthiazide (HYDRODIURIL) 25 MG tablet, Take 1 tablet by mouth every morning, Disp: 90 tablet, Rfl: 3    Multiple Vitamins-Minerals (MULTIVITAMIN ADULTS 50+) TABS, Take 1 tablet by mouth daily, Disp: , Rfl:     aspirin 81 MG EC tablet, Take by mouth daily, Disp: , Rfl:     montelukast (SINGULAIR) 10 MG tablet, Take 1 tablet by mouth daily, Disp: 30 tablet, Rfl: 3    Review of Systems   Constitutional: Negative. HENT: Negative. Eyes: Negative. Respiratory:  Positive for cough and shortness of breath. Cardiovascular: Negative. Gastrointestinal:         Reports GERD   Endocrine: Negative. Genitourinary: Negative. Musculoskeletal:         Right knee pain   Skin: Negative. Allergic/Immunologic: Negative. Neurological: Negative. Hematological: Negative. Psychiatric/Behavioral: Negative. Vitals:    02/23/23 0806   BP: 122/68   Pulse: 60   Temp: 98.6 °F (37 °C)   SpO2: 97%        Physical Exam  Constitutional:       Appearance: Normal appearance. HENT:      Head: Normocephalic. Nose: Nose normal.   Eyes:      Extraocular Movements: Extraocular movements intact. Pupils: Pupils are equal, round, and reactive to light. Cardiovascular:      Rate and Rhythm: Normal rate and regular rhythm. Pulmonary:      Effort: Pulmonary effort is normal.      Breath sounds: Normal breath sounds. Abdominal:      General: Abdomen is flat. Palpations: Abdomen is soft. Musculoskeletal:         General: Tenderness (right knee with flexion and extension) present. Cervical back: Normal range of motion and neck supple. Skin:     General: Skin is warm and dry. Neurological:      General: No focal deficit present. Mental Status: She is alert and oriented to person, place, and time. Psychiatric:         Mood and Affect: Mood normal.         Behavior: Behavior normal.      PHQ-9=2        Assessment & Plan:    1. Type 2 diabetes mellitus with chronic kidney disease, without long-term current use of insulin, unspecified CKD stage ([facility])  A1C=6.2-stable Continue low carb diet  - AMB POC HEMOGLOBIN A1C    2. Acute rhinitis  Stable; continue med  - fluticasone (FLONASE) 50 MCG/ACT nasal spray; 2 sprays by Each Nostril route daily  Dispense: 16 g; Refill: 1    3. Cough, unspecified type  - XR CHEST PA LAT (2 VIEWS); Future    4. Mixed hyperlipidemia  - simvastatin (ZOCOR) 20 MG tablet; Take 1 tablet by mouth nightly  Dispense: 90 tablet; Refill: 1  - CK  - Comprehensive Metabolic Panel  - Lipid Panel  - CK    5. Primary hypertension  Stable; continue med  - CBC with Auto Differential; Future  -Comprehensive Metabolic Panel    6. Vitamin D deficiency    - Vitamin D 25 Hydroxy    7. Chronic pain of right knee  - XR KNEE RIGHT (1-2 VIEWS); Future    8. SOB (shortness of breath)  - albuterol sulfate HFA (PROVENTIL;VENTOLIN;PROAIR) 108 (90 Base) MCG/ACT inhaler; Inhale 1 puff into the lungs every 6 hours as needed for Shortness of Breath  Dispense: 18 g; Refill: 0  - D-Dimer, Quantitative  - Brain Natriuretic Peptide    9. Gastroesophageal reflux disease without esophagitis  Will try med for GERD. Foods discussed as well  - famotidine (PEPCID) 20 MG tablet; Take 1 tablet by mouth 2 times daily  Dispense: 60 tablet; Refill: 2    10. Depression, unspecified depression type  Stable; continue med  - FLUoxetine HCl,  20 MG TABS; Take 20 mg by mouth daily  Dispense: 90 tablet;  Refill: 1    Greater than 50% counseling and/or coordination of care: the treatment regimen is extensive; detailed review. Will notify of labs/xrays. Recheck in 3 months. Sooner for problems. This note was dictated using dragon voice recognition software. It has been proofread, but there may still exist voice recognition errors that the author did not detect.       Signed By: KEVEN Perkins - DONIS     February 23, 2023

## 2023-02-23 NOTE — PATIENT INSTRUCTIONS
Patient Education        Learning About Carbohydrate (Carb) Counting and Eating Out When You Have Diabetes  Why plan your meals? Meal planning can be a key part of managing diabetes. Planning meals and snacks with the right balance of carbohydrate, protein, and fat can help you keep your blood sugar at the target level you set with your doctor. You don't have to eat special foods. You can eat what your family eats, including sweets once in a while. But you do have to pay attention to how often you eat and how much you eat of certain foods. You may want to work with a dietitian or a diabetes educator. They can give you tips and meal ideas and can answer your questions about meal planning. This health professional can also help you reach a healthy weight if that is one of your goals. What should you know about eating carbs? Managing the amount of carbohydrate (carbs) you eat is an important part of healthy meals when you have diabetes. Carbohydrate is found in many foods. Learn which foods have carbs. And learn the amounts of carbs in different foods. Bread, cereal, pasta, and rice have about 15 grams of carbs in a serving. A serving is 1 slice of bread (1 ounce), ½ cup of cooked cereal, or 1/3 cup of cooked pasta or rice. Fruits have 15 grams of carbs in a serving. A serving is 1 small fresh fruit, such as an apple or orange; ½ of a banana; ½ cup of cooked or canned fruit; ½ cup of fruit juice; 1 cup of melon or raspberries; or 2 tablespoons of dried fruit. Milk and no-sugar-added yogurt have 15 grams of carbs in a serving. A serving is 1 cup of milk or 3/4 cup (6 oz) of no-sugar-added yogurt. Starchy vegetables have 15 grams of carbs in a serving. A serving is ½ cup of mashed potatoes or sweet potato; 1 cup winter squash; ½ of a small baked potato; ½ cup of cooked beans; or ½ cup cooked corn or green peas.   Learn how much carbs to eat each day and at each meal. A dietitian or certified diabetes educator can teach you how to keep track of the amount of carbs you eat. This is called carbohydrate counting. If you are not sure how to count carbohydrate grams, use the plate method to plan meals. It is a quick way to make sure that you have a balanced meal. It also can help you manage the amount of carbohydrate you eat at meals. Divide your plate by types of foods. Put non-starchy vegetables on half the plate, meat or other protein food on one-quarter of the plate, and a grain or starchy vegetable in the final quarter of the plate. To this you can add a small piece of fruit and 1 cup of milk or yogurt, depending on how many carbs you are supposed to eat at a meal.  Try to eat about the same amount of carbs at each meal. Do not \"save up\" your daily allowance of carbs to eat at one meal.  Proteins have very little or no carbs. Examples of proteins are beef, chicken, turkey, fish, eggs, tofu, cheese, cottage cheese, and peanut butter. How can you eat out and still eat healthy? Learn to estimate the serving sizes of foods that have carbohydrate. If you measure food at home, it will be easier to estimate the amount in a serving of restaurant food. If the meal you order has too much carbohydrate (such as potatoes, corn, or baked beans), ask to have a low-carbohydrate food instead. Ask for a salad or non-starchy vegetables like broccoli, cauliflower, green beans, or peppers. If you eat more carbohydrate at a meal than you had planned, take a walk or do other exercise. This will help lower your blood sugar. What are some tips for eating healthy? Limit saturated fat, such as the fat from meat and dairy products. This is a healthy choice because people who have diabetes are at higher risk of heart disease. So choose lean cuts of meat and nonfat or low-fat dairy products. Use olive or canola oil instead of butter or shortening when cooking. Don't skip meals.  Your blood sugar may drop too low if you skip meals and take insulin or certain medicines for diabetes. Check with your doctor before you drink alcohol. Alcohol can cause your blood sugar to drop too low. Alcohol can also cause a bad reaction if you take certain diabetes medicines. Follow-up care is a key part of your treatment and safety. Be sure to make and go to all appointments, and call your doctor if you are having problems. It's also a good idea to know your test results and keep a list of the medicines you take. Where can you learn more? Go to http://www.woods.com/ and enter I147 to learn more about \"Learning About Carbohydrate (Carb) Counting and Eating Out When You Have Diabetes. \"  Current as of: May 9, 2022               Content Version: 13.5  © 2006-2022 Healthwise, Incorporated. Care instructions adapted under license by [facility] [place] [facility] [place] [address] If you have questions about a medical condition or this instruction, always ask your healthcare professional. [address] any warranty or liability for your use of this information.

## 2023-02-23 NOTE — RESULT ENCOUNTER NOTE
Results to patient please. D-dimer is positive and we need to proceed with chest CT to rule out PE. I have placed order stat for this. Should they need to use contrast, they need to be aware that her serum creatinine is elevated. Vitamin D is slightly decreased. Does she take vitamin D daily? BNP was within normal limits. Creatinine was 1.30 with a GFR of 42. Liver enzymes are normal.  Total cholesterol is 173, triglycerides 151, HDL 70, and LDL 72.8. Please continue low-carb diet. A1c was 6.2.   Thanks

## 2023-02-24 ENCOUNTER — HOSPITAL ENCOUNTER (OUTPATIENT)
Dept: CT IMAGING | Age: 83
End: 2023-02-24
Payer: MEDICARE

## 2023-02-24 DIAGNOSIS — R06.02 SOB (SHORTNESS OF BREATH): ICD-10-CM

## 2023-02-24 DIAGNOSIS — R79.89 POSITIVE D-DIMER: ICD-10-CM

## 2023-02-24 DIAGNOSIS — R06.02 SOB (SHORTNESS OF BREATH): Primary | ICD-10-CM

## 2023-02-24 PROCEDURE — 6360000004 HC RX CONTRAST MEDICATION: Performed by: NURSE PRACTITIONER

## 2023-02-24 PROCEDURE — 2580000003 HC RX 258: Performed by: NURSE PRACTITIONER

## 2023-02-24 PROCEDURE — 71260 CT THORAX DX C+: CPT | Performed by: NURSE PRACTITIONER

## 2023-02-24 RX ORDER — SODIUM CHLORIDE 0.9 % (FLUSH) 0.9 %
10 SYRINGE (ML) INJECTION
Status: COMPLETED | OUTPATIENT
Start: 2023-02-24 | End: 2023-02-24

## 2023-02-24 RX ORDER — 0.9 % SODIUM CHLORIDE 0.9 %
100 INTRAVENOUS SOLUTION INTRAVENOUS
Status: COMPLETED | OUTPATIENT
Start: 2023-02-24 | End: 2023-02-24

## 2023-02-24 RX ADMIN — SODIUM CHLORIDE 100 ML: 9 INJECTION, SOLUTION INTRAVENOUS at 07:34

## 2023-02-24 RX ADMIN — IOPAMIDOL 100 ML: 755 INJECTION, SOLUTION INTRAVENOUS at 07:33

## 2023-02-24 RX ADMIN — SODIUM CHLORIDE, PRESERVATIVE FREE 10 ML: 5 INJECTION INTRAVENOUS at 07:34

## 2023-02-24 NOTE — RESULT ENCOUNTER NOTE
I called pt and discussed results of CT. She knows not to take any more of the Xarelto. Will refer to pulmonology due to increasing SOB and possibility of pulmonary hypertension.    Thanks

## 2023-02-28 LAB
Lab: NORMAL
Lab: NORMAL
REFERENCE LAB: NORMAL

## 2023-04-20 ENCOUNTER — OFFICE VISIT (OUTPATIENT)
Dept: CARDIOLOGY CLINIC | Age: 83
End: 2023-04-20
Payer: MEDICARE

## 2023-04-20 ENCOUNTER — TELEMEDICINE (OUTPATIENT)
Dept: FAMILY MEDICINE CLINIC | Facility: CLINIC | Age: 83
End: 2023-04-20

## 2023-04-20 VITALS
SYSTOLIC BLOOD PRESSURE: 136 MMHG | HEART RATE: 88 BPM | WEIGHT: 169 LBS | DIASTOLIC BLOOD PRESSURE: 60 MMHG | BODY MASS INDEX: 26.53 KG/M2 | HEIGHT: 67 IN

## 2023-04-20 DIAGNOSIS — I50.32 DIASTOLIC CHF, CHRONIC (HCC): Primary | ICD-10-CM

## 2023-04-20 DIAGNOSIS — N18.31 STAGE 3A CHRONIC KIDNEY DISEASE (HCC): ICD-10-CM

## 2023-04-20 DIAGNOSIS — I25.10 CORONARY ARTERY DISEASE INVOLVING NATIVE CORONARY ARTERY OF NATIVE HEART WITHOUT ANGINA PECTORIS: ICD-10-CM

## 2023-04-20 DIAGNOSIS — E78.2 MIXED HYPERLIPIDEMIA: ICD-10-CM

## 2023-04-20 DIAGNOSIS — L29.9 ITCHING: Primary | ICD-10-CM

## 2023-04-20 PROCEDURE — G8399 PT W/DXA RESULTS DOCUMENT: HCPCS | Performed by: INTERNAL MEDICINE

## 2023-04-20 PROCEDURE — G8417 CALC BMI ABV UP PARAM F/U: HCPCS | Performed by: INTERNAL MEDICINE

## 2023-04-20 PROCEDURE — 1123F ACP DISCUSS/DSCN MKR DOCD: CPT | Performed by: INTERNAL MEDICINE

## 2023-04-20 PROCEDURE — G8428 CUR MEDS NOT DOCUMENT: HCPCS | Performed by: INTERNAL MEDICINE

## 2023-04-20 PROCEDURE — 1090F PRES/ABSN URINE INCON ASSESS: CPT | Performed by: INTERNAL MEDICINE

## 2023-04-20 PROCEDURE — 99214 OFFICE O/P EST MOD 30 MIN: CPT | Performed by: INTERNAL MEDICINE

## 2023-04-20 PROCEDURE — 1036F TOBACCO NON-USER: CPT | Performed by: INTERNAL MEDICINE

## 2023-04-20 RX ORDER — HYDROXYZINE HYDROCHLORIDE 10 MG/1
10 TABLET, FILM COATED ORAL 3 TIMES DAILY PRN
Qty: 90 TABLET | Refills: 1 | Status: SHIPPED | OUTPATIENT
Start: 2023-04-20

## 2023-04-20 ASSESSMENT — PATIENT HEALTH QUESTIONNAIRE - PHQ9
9. THOUGHTS THAT YOU WOULD BE BETTER OFF DEAD, OR OF HURTING YOURSELF: 0
10. IF YOU CHECKED OFF ANY PROBLEMS, HOW DIFFICULT HAVE THESE PROBLEMS MADE IT FOR YOU TO DO YOUR WORK, TAKE CARE OF THINGS AT HOME, OR GET ALONG WITH OTHER PEOPLE: 1
7. TROUBLE CONCENTRATING ON THINGS, SUCH AS READING THE NEWSPAPER OR WATCHING TELEVISION: 0
SUM OF ALL RESPONSES TO PHQ QUESTIONS 1-9: 9
SUM OF ALL RESPONSES TO PHQ QUESTIONS 1-9: 9
5. POOR APPETITE OR OVEREATING: 0
8. MOVING OR SPEAKING SO SLOWLY THAT OTHER PEOPLE COULD HAVE NOTICED. OR THE OPPOSITE, BEING SO FIGETY OR RESTLESS THAT YOU HAVE BEEN MOVING AROUND A LOT MORE THAN USUAL: 0
1. LITTLE INTEREST OR PLEASURE IN DOING THINGS: 1
4. FEELING TIRED OR HAVING LITTLE ENERGY: 3
6. FEELING BAD ABOUT YOURSELF - OR THAT YOU ARE A FAILURE OR HAVE LET YOURSELF OR YOUR FAMILY DOWN: 0
SUM OF ALL RESPONSES TO PHQ QUESTIONS 1-9: 9
3. TROUBLE FALLING OR STAYING ASLEEP: 3
SUM OF ALL RESPONSES TO PHQ QUESTIONS 1-9: 9
SUM OF ALL RESPONSES TO PHQ9 QUESTIONS 1 & 2: 3
2. FEELING DOWN, DEPRESSED OR HOPELESS: 2

## 2023-04-20 NOTE — PROGRESS NOTES
[address], [address] [facility], [address] [place] [address]  PHONE: [phone]     23    NAME:  Nicole Qureshi  : 1940  MRN: [id number]       SUBJECTIVE:   Nicole Qureshi is a [de-identified] y.o. female seen for a follow up visit regarding the following:     Chief Complaint   Patient presents with    Congestive Heart Failure       HPI: HPI: Here today for follow up for CAD   PCI to Diag, echo showed normal EF  Carotid US:  3/16 and 2018: mild carotid Dz  [facility] 11/10/17: mild CAD, EF 60%. [facility] 2022:  mild CAD, EF normal, EDP 36     LOAIZA better on HCTZ, \"a lot better\". No edema. Some lack of energy in PM.  NO new CP. Patient denies recent history of orthopnea, PND, excessive dizziness and/or syncope. She has GLEN, cannot wear mask. Did not tolerate lipitor, crestor. Past Medical History, Past Surgical History, Family history, Social History, and Medications were all reviewed with the patient today and updated as necessary.      Current Outpatient Medications   Medication Sig Dispense Refill    albuterol sulfate HFA (PROVENTIL;VENTOLIN;PROAIR) 108 (90 Base) MCG/ACT inhaler Inhale 1 puff into the lungs every 6 hours as needed for Shortness of Breath 18 g 0    FLUoxetine HCl, PMDD, 20 MG TABS Take 20 mg by mouth daily 90 tablet 1    fluticasone (FLONASE) 50 MCG/ACT nasal spray 2 sprays by Each Nostril route daily 16 g 1    simvastatin (ZOCOR) 20 MG tablet Take 1 tablet by mouth nightly 90 tablet 1    famotidine (PEPCID) 20 MG tablet Take 1 tablet by mouth 2 times daily 60 tablet 2    Cholecalciferol (VITAMIN D) 50 MCG ( UT) CAPS capsule Take 1 capsule by mouth daily      vitamin B-1 (THIAMINE) 100 MG tablet Take 1 tablet by mouth daily      montelukast (SINGULAIR) 10 MG tablet Take 1 tablet by mouth daily 30 tablet 3    potassium chloride (KLOR-CON M) 10 MEQ extended release tablet Take 1 tablet by mouth daily 90 tablet 3    hydroCHLOROthiazide (HYDRODIURIL) 25 MG tablet Take 1 tablet

## 2023-04-20 NOTE — PROGRESS NOTES
Rafael Mendez (:  1940) is a Established patient, presenting virtually for evaluation of the following:    Assessment & Plan   Below is the assessment and plan developed based on review of pertinent history, physical exam, labs, studies, and medications. 1. Itching  Advised to come into the office if fails to resolve. Would like to check for kidney, liver or thyroid disease.   -     hydrOXYzine HCl (ATARAX) 10 MG tablet; Take 1 tablet by mouth 3 times daily as needed for Itching, Disp-90 tablet, R-1Normal    No follow-ups on file. Subjective   Patient is being seen today regarding itching. Son in law has cancer. She cares for him during the day. Reports feeling very anxious. Feels anxiety is causing her to feel itching. She reports similar symptoms when going through a divorce in the past.     Review of Systems       Objective   Patient-Reported Vitals  No data recorded     Physical Exam             Rafael Mendez, was evaluated through a synchronous (real-time) audio only encounter. The patient (or guardian if applicable) is aware that this is a billable service, which includes applicable co-pays. This Virtual Visit was conducted with patient's (and/or legal guardian's) consent. The visit was conducted pursuant to the emergency declaration under the Former[facility] named [facility], [address] [place] [address] [facility] authority and the [employer] and [employer] General Act. Patient identification was verified, and a caregiver was present when appropriate.    The patient was located at Home: [address] [place] [id number]  Provider was located at [facility] ([facility] Dept): Zainab [address] [place] [address],  [address]         --Hulon Phoenix, MD

## 2023-05-25 ENCOUNTER — HOSPITAL ENCOUNTER (OUTPATIENT)
Dept: CT IMAGING | Age: 83
Discharge: HOME | End: 2023-05-25
Payer: MEDICARE

## 2023-05-25 ENCOUNTER — OFFICE VISIT (OUTPATIENT)
Dept: FAMILY MEDICINE CLINIC | Facility: CLINIC | Age: 83
End: 2023-05-25
Payer: MEDICARE

## 2023-05-25 VITALS
TEMPERATURE: 98.3 F | BODY MASS INDEX: 1.98 KG/M2 | HEIGHT: 67 IN | HEART RATE: 66 BPM | OXYGEN SATURATION: 99 % | DIASTOLIC BLOOD PRESSURE: 71 MMHG | WEIGHT: 12.6 LBS | SYSTOLIC BLOOD PRESSURE: 132 MMHG

## 2023-05-25 DIAGNOSIS — E78.2 MIXED HYPERLIPIDEMIA: ICD-10-CM

## 2023-05-25 DIAGNOSIS — J00 ACUTE RHINITIS: ICD-10-CM

## 2023-05-25 DIAGNOSIS — R73.09 ELEVATED GLUCOSE: ICD-10-CM

## 2023-05-25 DIAGNOSIS — K21.9 GASTROESOPHAGEAL REFLUX DISEASE WITHOUT ESOPHAGITIS: ICD-10-CM

## 2023-05-25 DIAGNOSIS — I10 PRIMARY HYPERTENSION: ICD-10-CM

## 2023-05-25 DIAGNOSIS — R10.31 RIGHT LOWER QUADRANT ABDOMINAL PAIN: ICD-10-CM

## 2023-05-25 DIAGNOSIS — D75.89 MACROCYTIC: ICD-10-CM

## 2023-05-25 DIAGNOSIS — Z78.0 POSTMENOPAUSAL: ICD-10-CM

## 2023-05-25 DIAGNOSIS — R10.31 RIGHT LOWER QUADRANT ABDOMINAL PAIN: Primary | ICD-10-CM

## 2023-05-25 DIAGNOSIS — F32.A DEPRESSION, UNSPECIFIED DEPRESSION TYPE: ICD-10-CM

## 2023-05-25 LAB
BASOPHILS # BLD: 0.1 K/UL (ref 0–0.2)
BASOPHILS NFR BLD: 1 % (ref 0–2)
BILIRUBIN, URINE, POC: NEGATIVE
BLOOD URINE, POC: NEGATIVE
CREAT BLD-MCNC: 1.03 MG/DL (ref 0.8–1.5)
DIFFERENTIAL METHOD BLD: ABNORMAL
EOSINOPHIL # BLD: 0.4 K/UL (ref 0–0.8)
EOSINOPHIL NFR BLD: 6 % (ref 0.5–7.8)
ERYTHROCYTE [DISTWIDTH] IN BLOOD BY AUTOMATED COUNT: 12.9 % (ref 11.9–14.6)
GLUCOSE URINE, POC: NEGATIVE
HCT VFR BLD AUTO: 39.7 % (ref 35.8–46.3)
HGB BLD-MCNC: 12.4 G/DL (ref 11.7–15.4)
IMM GRANULOCYTES # BLD AUTO: 0 K/UL (ref 0–0.5)
IMM GRANULOCYTES NFR BLD AUTO: 0 % (ref 0–5)
KETONES, URINE, POC: NEGATIVE
LEUKOCYTE ESTERASE, URINE, POC: NORMAL
LYMPHOCYTES # BLD: 2.4 K/UL (ref 0.5–4.6)
LYMPHOCYTES NFR BLD: 42 % (ref 13–44)
MCH RBC QN AUTO: 32 PG (ref 26.1–32.9)
MCHC RBC AUTO-ENTMCNC: 31.2 G/DL (ref 31.4–35)
MCV RBC AUTO: 102.6 FL (ref 82–102)
MONOCYTES # BLD: 0.5 K/UL (ref 0.1–1.3)
MONOCYTES NFR BLD: 8 % (ref 4–12)
NEUTS SEG # BLD: 2.4 K/UL (ref 1.7–8.2)
NEUTS SEG NFR BLD: 43 % (ref 43–78)
NITRITE, URINE, POC: NEGATIVE
NRBC # BLD: 0 K/UL (ref 0–0.2)
PH, URINE, POC: 6 (ref 4.6–8)
PLATELET # BLD AUTO: 182 K/UL (ref 150–450)
PMV BLD AUTO: 12.6 FL (ref 9.4–12.3)
PROTEIN,URINE, POC: NEGATIVE
RBC # BLD AUTO: 3.87 M/UL (ref 4.05–5.2)
SPECIFIC GRAVITY, URINE, POC: 1.01 (ref 1–1.03)
URINALYSIS CLARITY, POC: NORMAL
URINALYSIS COLOR, POC: YELLOW
UROBILINOGEN, POC: NORMAL
WBC # BLD AUTO: 5.6 K/UL (ref 4.3–11.1)

## 2023-05-25 PROCEDURE — G8399 PT W/DXA RESULTS DOCUMENT: HCPCS | Performed by: NURSE PRACTITIONER

## 2023-05-25 PROCEDURE — 3078F DIAST BP <80 MM HG: CPT | Performed by: NURSE PRACTITIONER

## 2023-05-25 PROCEDURE — 82565 ASSAY OF CREATININE: CPT

## 2023-05-25 PROCEDURE — 1123F ACP DISCUSS/DSCN MKR DOCD: CPT | Performed by: NURSE PRACTITIONER

## 2023-05-25 PROCEDURE — 81003 URINALYSIS AUTO W/O SCOPE: CPT | Performed by: NURSE PRACTITIONER

## 2023-05-25 PROCEDURE — 74176 CT ABD & PELVIS W/O CONTRAST: CPT

## 2023-05-25 PROCEDURE — 99214 OFFICE O/P EST MOD 30 MIN: CPT | Performed by: NURSE PRACTITIONER

## 2023-05-25 PROCEDURE — 1036F TOBACCO NON-USER: CPT | Performed by: NURSE PRACTITIONER

## 2023-05-25 PROCEDURE — 3075F SYST BP GE 130 - 139MM HG: CPT | Performed by: NURSE PRACTITIONER

## 2023-05-25 PROCEDURE — G8427 DOCREV CUR MEDS BY ELIG CLIN: HCPCS | Performed by: NURSE PRACTITIONER

## 2023-05-25 PROCEDURE — 1090F PRES/ABSN URINE INCON ASSESS: CPT | Performed by: NURSE PRACTITIONER

## 2023-05-25 PROCEDURE — G8418 CALC BMI BLW LOW PARAM F/U: HCPCS | Performed by: NURSE PRACTITIONER

## 2023-05-25 PROCEDURE — 6360000004 HC RX CONTRAST MEDICATION: Performed by: NURSE PRACTITIONER

## 2023-05-25 RX ORDER — POTASSIUM CHLORIDE 750 MG/1
10 TABLET, EXTENDED RELEASE ORAL DAILY
Qty: 90 TABLET | Refills: 1 | Status: SHIPPED | OUTPATIENT
Start: 2023-05-25

## 2023-05-25 RX ORDER — HYDROCHLOROTHIAZIDE 25 MG/1
25 TABLET ORAL EVERY MORNING
Qty: 90 TABLET | Refills: 1 | Status: SHIPPED | OUTPATIENT
Start: 2023-05-25

## 2023-05-25 RX ORDER — SIMVASTATIN 20 MG
20 TABLET ORAL NIGHTLY
Qty: 90 TABLET | Refills: 1 | Status: SHIPPED | OUTPATIENT
Start: 2023-05-25

## 2023-05-25 RX ORDER — SODIUM CHLORIDE 0.9 % (FLUSH) 0.9 %
10 SYRINGE (ML) INJECTION
Status: DISCONTINUED | OUTPATIENT
Start: 2023-05-25 | End: 2023-05-25

## 2023-05-25 RX ORDER — 0.9 % SODIUM CHLORIDE 0.9 %
100 INTRAVENOUS SOLUTION INTRAVENOUS
Status: DISCONTINUED | OUTPATIENT
Start: 2023-05-25 | End: 2023-05-25

## 2023-05-25 RX ORDER — FLUTICASONE PROPIONATE 50 MCG
2 SPRAY, SUSPENSION (ML) NASAL DAILY
Qty: 16 G | Refills: 1 | Status: SHIPPED | OUTPATIENT
Start: 2023-05-25

## 2023-05-25 RX ORDER — FLUOXETINE 20 MG/1
20 TABLET ORAL DAILY
Qty: 90 TABLET | Refills: 1 | Status: SHIPPED | OUTPATIENT
Start: 2023-05-25

## 2023-05-25 RX ADMIN — DIATRIZOATE MEGLUMINE AND DIATRIZOATE SODIUM 15 ML: 660; 100 LIQUID ORAL; RECTAL at 11:15

## 2023-05-25 ASSESSMENT — PATIENT HEALTH QUESTIONNAIRE - PHQ9
9. THOUGHTS THAT YOU WOULD BE BETTER OFF DEAD, OR OF HURTING YOURSELF: 0
3. TROUBLE FALLING OR STAYING ASLEEP: 1
2. FEELING DOWN, DEPRESSED OR HOPELESS: 3
7. TROUBLE CONCENTRATING ON THINGS, SUCH AS READING THE NEWSPAPER OR WATCHING TELEVISION: 1
10. IF YOU CHECKED OFF ANY PROBLEMS, HOW DIFFICULT HAVE THESE PROBLEMS MADE IT FOR YOU TO DO YOUR WORK, TAKE CARE OF THINGS AT HOME, OR GET ALONG WITH OTHER PEOPLE: 0
SUM OF ALL RESPONSES TO PHQ QUESTIONS 1-9: 6
SUM OF ALL RESPONSES TO PHQ9 QUESTIONS 1 & 2: 3
4. FEELING TIRED OR HAVING LITTLE ENERGY: 1
SUM OF ALL RESPONSES TO PHQ QUESTIONS 1-9: 6
1. LITTLE INTEREST OR PLEASURE IN DOING THINGS: 0
8. MOVING OR SPEAKING SO SLOWLY THAT OTHER PEOPLE COULD HAVE NOTICED. OR THE OPPOSITE, BEING SO FIGETY OR RESTLESS THAT YOU HAVE BEEN MOVING AROUND A LOT MORE THAN USUAL: 0
6. FEELING BAD ABOUT YOURSELF - OR THAT YOU ARE A FAILURE OR HAVE LET YOURSELF OR YOUR FAMILY DOWN: 0
SUM OF ALL RESPONSES TO PHQ QUESTIONS 1-9: 6
5. POOR APPETITE OR OVEREATING: 0
SUM OF ALL RESPONSES TO PHQ QUESTIONS 1-9: 6

## 2023-05-25 ASSESSMENT — ENCOUNTER SYMPTOMS
RESPIRATORY NEGATIVE: 1
ALLERGIC/IMMUNOLOGIC NEGATIVE: 1
EYES NEGATIVE: 1
ABDOMINAL PAIN: 1

## 2023-05-25 NOTE — RESULT ENCOUNTER NOTE
Result to pt please. CT IMPRESSION:  1. No acute abdominal or pelvic abnormality noted on this limited examination  without the benefit of intravenous contrast. No renal or ureteral stones noted. The appendix is normal.  2. Mild diverticulosis without diverticulitis. 3. Bilateral right greater than left fat-containing inguinal hernias. Her records show that she has had an appendectomy. How did they see an appendix? Please verify with pt that she has had an appendectomy. Is she aware of the inguinal hernias?     Thanks

## 2023-05-25 NOTE — PROGRESS NOTES
20 mg by mouth daily, Disp: 90 tablet, Rfl: 1    simvastatin (ZOCOR) 20 MG tablet, Take 1 tablet by mouth nightly, Disp: 90 tablet, Rfl: 1    potassium chloride (KLOR-CON M) 10 MEQ extended release tablet, Take 1 tablet by mouth daily, Disp: 90 tablet, Rfl: 1    hydroCHLOROthiazide (HYDRODIURIL) 25 MG tablet, Take 1 tablet by mouth every morning, Disp: 90 tablet, Rfl: 1    hydrOXYzine HCl (ATARAX) 10 MG tablet, Take 1 tablet by mouth 3 times daily as needed for Itching, Disp: 90 tablet, Rfl: 1    albuterol sulfate HFA (PROVENTIL;VENTOLIN;PROAIR) 108 (90 Base) MCG/ACT inhaler, Inhale 1 puff into the lungs every 6 hours as needed for Shortness of Breath, Disp: 18 g, Rfl: 0    Cholecalciferol (VITAMIN D) 50 MCG (2000 UT) CAPS capsule, Take 1 capsule by mouth daily, Disp: , Rfl:     vitamin B-1 (THIAMINE) 100 MG tablet, Take 1 tablet by mouth daily, Disp: , Rfl:     Multiple Vitamins-Minerals (MULTIVITAMIN ADULTS 50+) TABS, Take 1 tablet by mouth daily, Disp: , Rfl:     aspirin 81 MG EC tablet, Take by mouth daily, Disp: , Rfl:     Review of Systems   Constitutional: Negative. HENT: Negative. Eyes: Negative. Respiratory: Negative. Cardiovascular: Negative. Gastrointestinal:  Positive for abdominal pain. Endocrine: Negative. Genitourinary: Negative. Musculoskeletal: Negative. Skin: Negative. Allergic/Immunologic: Negative. Neurological: Negative. Hematological: Negative. Psychiatric/Behavioral: Negative. Vitals:    05/25/23 0811   BP: 132/71   Pulse: 66   Temp: 98.3 °F (36.8 °C)   SpO2: 99%        Physical Exam  Constitutional:       Appearance: Normal appearance. HENT:      Head: Normocephalic. Nose: Nose normal.   Eyes:      Extraocular Movements: Extraocular movements intact. Pupils: Pupils are equal, round, and reactive to light. Cardiovascular:      Rate and Rhythm: Normal rate and regular rhythm.    Pulmonary:      Effort: Pulmonary effort is normal.

## 2023-05-26 LAB
ALBUMIN SERPL-MCNC: 3.9 G/DL (ref 3.2–4.6)
ALBUMIN/GLOB SERPL: 1.2 (ref 0.4–1.6)
ALP SERPL-CCNC: 91 U/L (ref 50–136)
ALT SERPL-CCNC: 24 U/L (ref 12–65)
ANION GAP SERPL CALC-SCNC: 3 MMOL/L (ref 2–11)
AST SERPL-CCNC: 19 U/L (ref 15–37)
BILIRUB SERPL-MCNC: 0.3 MG/DL (ref 0.2–1.1)
BUN SERPL-MCNC: 19 MG/DL (ref 8–23)
CALCIUM SERPL-MCNC: 9.7 MG/DL (ref 8.3–10.4)
CHLORIDE SERPL-SCNC: 105 MMOL/L (ref 101–110)
CHOLEST SERPL-MCNC: 168 MG/DL
CK SERPL-CCNC: 103 U/L (ref 21–215)
CO2 SERPL-SCNC: 28 MMOL/L (ref 21–32)
CREAT SERPL-MCNC: 1.1 MG/DL (ref 0.6–1)
EST. AVERAGE GLUCOSE BLD GHB EST-MCNC: 134 MG/DL
FOLATE SERPL-MCNC: 19.1 NG/ML (ref 3.1–17.5)
GLOBULIN SER CALC-MCNC: 3.3 G/DL (ref 2.8–4.5)
GLUCOSE SERPL-MCNC: 100 MG/DL (ref 65–100)
HBA1C MFR BLD: 6.3 % (ref 4.8–5.6)
HDLC SERPL-MCNC: 64 MG/DL (ref 40–60)
HDLC SERPL: 2.6
LDLC SERPL CALC-MCNC: 79.2 MG/DL
MAGNESIUM SERPL-MCNC: 2.3 MG/DL (ref 1.8–2.4)
POTASSIUM SERPL-SCNC: 5 MMOL/L (ref 3.5–5.1)
PROT SERPL-MCNC: 7.2 G/DL (ref 6.3–8.2)
SODIUM SERPL-SCNC: 136 MMOL/L (ref 133–143)
TRIGL SERPL-MCNC: 124 MG/DL (ref 35–150)
VIT B12 SERPL-MCNC: 397 PG/ML (ref 193–986)
VLDLC SERPL CALC-MCNC: 24.8 MG/DL (ref 6–23)

## 2023-05-28 LAB
BACTERIA SPEC CULT: ABNORMAL
BACTERIA SPEC CULT: ABNORMAL
SERVICE CMNT-IMP: ABNORMAL

## 2023-05-30 RX ORDER — LOSARTAN POTASSIUM 25 MG/1
25 TABLET ORAL DAILY
Qty: 30 TABLET | Refills: 5 | Status: SHIPPED | OUTPATIENT
Start: 2023-05-30 | End: 2023-06-19

## 2023-05-30 RX ORDER — CEPHALEXIN 500 MG/1
500 CAPSULE ORAL 3 TIMES DAILY
Qty: 21 CAPSULE | Refills: 0 | Status: SHIPPED | OUTPATIENT
Start: 2023-05-30 | End: 2023-06-06

## 2023-05-31 ENCOUNTER — TELEPHONE (OUTPATIENT)
Dept: FAMILY MEDICINE CLINIC | Facility: CLINIC | Age: 83
End: 2023-05-31

## 2023-05-31 DIAGNOSIS — K40.20 BILATERAL INGUINAL HERNIA WITHOUT OBSTRUCTION OR GANGRENE, RECURRENCE NOT SPECIFIED: Primary | ICD-10-CM

## 2023-05-31 NOTE — TELEPHONE ENCOUNTER
5/25/23 lab result note reads: Does she want to see a surgeon about having inguinal hernias repaired? At the time, patient did not wish to see surgeon. However, she would like referral placed now.

## 2023-05-31 NOTE — TELEPHONE ENCOUNTER
Pt states that she decided to get referred to a surgeon that Eunice Hernandez was going to refer her to before she said no.

## 2023-06-08 ENCOUNTER — HOSPITAL ENCOUNTER (OUTPATIENT)
Dept: MAMMOGRAPHY | Age: 83
Discharge: HOME | End: 2023-06-08
Payer: MEDICARE

## 2023-06-08 DIAGNOSIS — Z78.0 POSTMENOPAUSAL: ICD-10-CM

## 2023-06-08 PROCEDURE — 77080 DXA BONE DENSITY AXIAL: CPT

## 2023-06-13 DIAGNOSIS — R10.31 RIGHT LOWER QUADRANT ABDOMINAL PAIN: ICD-10-CM

## 2023-06-13 LAB
BASOPHILS # BLD: 0.1 K/UL (ref 0–0.2)
BASOPHILS NFR BLD: 1 % (ref 0–2)
DIFFERENTIAL METHOD BLD: ABNORMAL
EOSINOPHIL # BLD: 0.2 K/UL (ref 0–0.8)
EOSINOPHIL NFR BLD: 4 % (ref 0.5–7.8)
ERYTHROCYTE [DISTWIDTH] IN BLOOD BY AUTOMATED COUNT: 12.8 % (ref 11.9–14.6)
HCT VFR BLD AUTO: 38.3 % (ref 35.8–46.3)
HGB BLD-MCNC: 12.1 G/DL (ref 11.7–15.4)
IMM GRANULOCYTES # BLD AUTO: 0 K/UL (ref 0–0.5)
IMM GRANULOCYTES NFR BLD AUTO: 0 % (ref 0–5)
LYMPHOCYTES # BLD: 2 K/UL (ref 0.5–4.6)
LYMPHOCYTES NFR BLD: 37 % (ref 13–44)
MCH RBC QN AUTO: 31.6 PG (ref 26.1–32.9)
MCHC RBC AUTO-ENTMCNC: 31.6 G/DL (ref 31.4–35)
MCV RBC AUTO: 100 FL (ref 82–102)
MONOCYTES # BLD: 0.4 K/UL (ref 0.1–1.3)
MONOCYTES NFR BLD: 8 % (ref 4–12)
NEUTS SEG # BLD: 2.5 K/UL (ref 1.7–8.2)
NEUTS SEG NFR BLD: 50 % (ref 43–78)
NRBC # BLD: 0 K/UL (ref 0–0.2)
PLATELET # BLD AUTO: 179 K/UL (ref 150–450)
PMV BLD AUTO: 12.4 FL (ref 9.4–12.3)
RBC # BLD AUTO: 3.83 M/UL (ref 4.05–5.2)
WBC # BLD AUTO: 5.2 K/UL (ref 4.3–11.1)

## 2023-06-13 NOTE — RESULT ENCOUNTER NOTE
IMPRESSION:  Using the World Health Organization criteria, the bone mineral  density is normal.    Continue with what she is doing. Looks good.   Thanks

## 2023-06-14 NOTE — RESULT ENCOUNTER NOTE
To pt. Hgb is wnl. However RBCs still decreased.  Did she ever see hematology regarding this after we made that referral?  Thanks

## 2023-06-15 ENCOUNTER — PREP FOR PROCEDURE (OUTPATIENT)
Dept: SURGERY | Age: 83
End: 2023-06-15

## 2023-06-15 PROBLEM — K40.20 NON-RECURRENT BILATERAL INGUINAL HERNIA WITHOUT OBSTRUCTION OR GANGRENE: Status: ACTIVE | Noted: 2023-06-15

## 2023-06-15 RX ORDER — SODIUM CHLORIDE 0.9 % (FLUSH) 0.9 %
5-40 SYRINGE (ML) INJECTION EVERY 12 HOURS SCHEDULED
Status: CANCELLED | OUTPATIENT
Start: 2023-06-15

## 2023-06-15 RX ORDER — SODIUM CHLORIDE 9 MG/ML
INJECTION, SOLUTION INTRAVENOUS PRN
Status: CANCELLED | OUTPATIENT
Start: 2023-06-15

## 2023-06-15 RX ORDER — SODIUM CHLORIDE 0.9 % (FLUSH) 0.9 %
5-40 SYRINGE (ML) INJECTION PRN
Status: CANCELLED | OUTPATIENT
Start: 2023-06-15

## 2023-06-19 RX ORDER — VITAMIN B COMPLEX
1 CAPSULE ORAL DAILY
COMMUNITY

## 2023-06-19 NOTE — PRE-PROCEDURE INSTRUCTIONS
Patient verified name and . Order for consent was found in EHR and matches case posting; patient verifies procedure. Type II surgery, phone assessment complete. Orders were received. Labs per surgeon: initiate PAT protocol. Labs per anesthesia protocol: Hgb 12.1, 2023. EK2022, acceptable per protocol. Anesthesia review, patient not taking Aspirin 81 mg or any other antiplatelets medications. , has 1 cardiac stent. Patient answered medical/surgical history questions at their best of ability. All prior to admission medications documented in EPIC. Patient instructed to take the following medications the day of surgery according to anesthesia guidelines with a small sip of water: none On the day before surgery please take Acetaminophen 1000mg in the morning and then again before bed. You may substitute for Tylenol 650 mg. Hold all vitamins 7 days prior to surgery and NSAIDS 5 days prior to surgery. Prescription meds to hold:none  Patient instructed on the following:    > Arrive at [facility], time of arrival to be called the day before by 1700  > NPO after midnight, unless otherwise indicated, including gum, mints, and ice chips  > Responsible adult must drive patient to the hospital, stay during surgery, and patient will need supervision 24 hours after anesthesia  > Use antibacterial soap in shower the night before surgery and on the morning of surgery  > All piercings must be removed prior to arrival.    > Leave all valuables (money and jewelry) at home but bring insurance card and ID on DOS.   > You may be required to pay a deductible or co-pay on the day of your procedure. You can pre-pay by calling [phone] if your surgery is at the [facility] [place]note 2] or [phone] if your surgery is at the [facility]. > Do not wear make-up, nail polish, lotions, cologne, perfumes, powders, or oil on skin. Artificial nails are not permitted.

## 2023-06-20 NOTE — PROGRESS NOTES
Dr Kaye Granados reviewed chart. New order received. Patient made aware that an Aspirin 81 mg is preferred throughout the perioperative period for protection of her stent. Patient repeated the instructions back to me and verbalizes understanding.

## 2023-06-22 ENCOUNTER — ANESTHESIA EVENT (OUTPATIENT)
Dept: SURGERY | Age: 83
End: 2023-06-22
Payer: MEDICARE

## 2023-06-23 ENCOUNTER — HOSPITAL ENCOUNTER (OUTPATIENT)
Age: 83
Setting detail: OUTPATIENT SURGERY
Discharge: HOME | End: 2023-06-23
Attending: STUDENT IN AN ORGANIZED HEALTH CARE EDUCATION/TRAINING PROGRAM | Admitting: STUDENT IN AN ORGANIZED HEALTH CARE EDUCATION/TRAINING PROGRAM
Payer: MEDICARE

## 2023-06-23 ENCOUNTER — ANESTHESIA (OUTPATIENT)
Dept: SURGERY | Age: 83
End: 2023-06-23
Payer: MEDICARE

## 2023-06-23 VITALS
HEIGHT: 67 IN | RESPIRATION RATE: 16 BRPM | OXYGEN SATURATION: 91 % | HEART RATE: 102 BPM | DIASTOLIC BLOOD PRESSURE: 70 MMHG | BODY MASS INDEX: 26.76 KG/M2 | TEMPERATURE: 98 F | SYSTOLIC BLOOD PRESSURE: 132 MMHG | WEIGHT: 170.5 LBS

## 2023-06-23 DIAGNOSIS — K40.20 NON-RECURRENT BILATERAL INGUINAL HERNIA WITHOUT OBSTRUCTION OR GANGRENE: Primary | ICD-10-CM

## 2023-06-23 LAB
GLUCOSE BLD STRIP.AUTO-MCNC: 110 MG/DL (ref 65–100)
SERVICE CMNT-IMP: ABNORMAL

## 2023-06-23 PROCEDURE — 3700000001 HC ADD 15 MINUTES (ANESTHESIA): Performed by: STUDENT IN AN ORGANIZED HEALTH CARE EDUCATION/TRAINING PROGRAM

## 2023-06-23 PROCEDURE — 3600000009 HC SURGERY ROBOT BASE: Performed by: STUDENT IN AN ORGANIZED HEALTH CARE EDUCATION/TRAINING PROGRAM

## 2023-06-23 PROCEDURE — 6370000000 HC RX 637 (ALT 250 FOR IP): Performed by: ANESTHESIOLOGY

## 2023-06-23 PROCEDURE — 7100000010 HC PHASE II RECOVERY - FIRST 15 MIN: Performed by: STUDENT IN AN ORGANIZED HEALTH CARE EDUCATION/TRAINING PROGRAM

## 2023-06-23 PROCEDURE — 3600000019 HC SURGERY ROBOT ADDTL 15MIN: Performed by: STUDENT IN AN ORGANIZED HEALTH CARE EDUCATION/TRAINING PROGRAM

## 2023-06-23 PROCEDURE — 3700000000 HC ANESTHESIA ATTENDED CARE: Performed by: STUDENT IN AN ORGANIZED HEALTH CARE EDUCATION/TRAINING PROGRAM

## 2023-06-23 PROCEDURE — 7100000001 HC PACU RECOVERY - ADDTL 15 MIN: Performed by: STUDENT IN AN ORGANIZED HEALTH CARE EDUCATION/TRAINING PROGRAM

## 2023-06-23 PROCEDURE — 82962 GLUCOSE BLOOD TEST: CPT

## 2023-06-23 PROCEDURE — 2500000003 HC RX 250 WO HCPCS: Performed by: STUDENT IN AN ORGANIZED HEALTH CARE EDUCATION/TRAINING PROGRAM

## 2023-06-23 PROCEDURE — 7100000000 HC PACU RECOVERY - FIRST 15 MIN: Performed by: STUDENT IN AN ORGANIZED HEALTH CARE EDUCATION/TRAINING PROGRAM

## 2023-06-23 PROCEDURE — 2500000003 HC RX 250 WO HCPCS: Performed by: NURSE ANESTHETIST, CERTIFIED REGISTERED

## 2023-06-23 PROCEDURE — 2709999900 HC NON-CHARGEABLE SUPPLY: Performed by: STUDENT IN AN ORGANIZED HEALTH CARE EDUCATION/TRAINING PROGRAM

## 2023-06-23 PROCEDURE — 6360000002 HC RX W HCPCS: Performed by: STUDENT IN AN ORGANIZED HEALTH CARE EDUCATION/TRAINING PROGRAM

## 2023-06-23 PROCEDURE — 44180 LAP ENTEROLYSIS: CPT | Performed by: STUDENT IN AN ORGANIZED HEALTH CARE EDUCATION/TRAINING PROGRAM

## 2023-06-23 PROCEDURE — 6360000002 HC RX W HCPCS: Performed by: NURSE ANESTHETIST, CERTIFIED REGISTERED

## 2023-06-23 PROCEDURE — 7100000011 HC PHASE II RECOVERY - ADDTL 15 MIN: Performed by: STUDENT IN AN ORGANIZED HEALTH CARE EDUCATION/TRAINING PROGRAM

## 2023-06-23 PROCEDURE — 2580000003 HC RX 258: Performed by: ANESTHESIOLOGY

## 2023-06-23 PROCEDURE — 6360000002 HC RX W HCPCS: Performed by: ANESTHESIOLOGY

## 2023-06-23 PROCEDURE — S2900 ROBOTIC SURGICAL SYSTEM: HCPCS | Performed by: STUDENT IN AN ORGANIZED HEALTH CARE EDUCATION/TRAINING PROGRAM

## 2023-06-23 RX ORDER — SODIUM CHLORIDE 9 MG/ML
INJECTION, SOLUTION INTRAVENOUS PRN
Status: DISCONTINUED | OUTPATIENT
Start: 2023-06-23 | End: 2023-06-23 | Stop reason: HOSPADM

## 2023-06-23 RX ORDER — HYDROMORPHONE HYDROCHLORIDE 2 MG/ML
0.25 INJECTION, SOLUTION INTRAMUSCULAR; INTRAVENOUS; SUBCUTANEOUS EVERY 5 MIN PRN
Status: DISCONTINUED | OUTPATIENT
Start: 2023-06-23 | End: 2023-06-23 | Stop reason: HOSPADM

## 2023-06-23 RX ORDER — SODIUM CHLORIDE, SODIUM LACTATE, POTASSIUM CHLORIDE, CALCIUM CHLORIDE 600; 310; 30; 20 MG/100ML; MG/100ML; MG/100ML; MG/100ML
INJECTION, SOLUTION INTRAVENOUS CONTINUOUS
Status: DISCONTINUED | OUTPATIENT
Start: 2023-06-23 | End: 2023-06-23 | Stop reason: HOSPADM

## 2023-06-23 RX ORDER — EPHEDRINE SULFATE/0.9% NACL/PF 50 MG/5 ML
SYRINGE (ML) INTRAVENOUS PRN
Status: DISCONTINUED | OUTPATIENT
Start: 2023-06-23 | End: 2023-06-23 | Stop reason: SDUPTHER

## 2023-06-23 RX ORDER — ACETAMINOPHEN 500 MG
1000 TABLET ORAL ONCE
Status: COMPLETED | OUTPATIENT
Start: 2023-06-23 | End: 2023-06-23

## 2023-06-23 RX ORDER — ROCURONIUM BROMIDE 10 MG/ML
INJECTION, SOLUTION INTRAVENOUS PRN
Status: DISCONTINUED | OUTPATIENT
Start: 2023-06-23 | End: 2023-06-23 | Stop reason: SDUPTHER

## 2023-06-23 RX ORDER — SODIUM CHLORIDE 0.9 % (FLUSH) 0.9 %
5-40 SYRINGE (ML) INJECTION EVERY 12 HOURS SCHEDULED
Status: DISCONTINUED | OUTPATIENT
Start: 2023-06-23 | End: 2023-06-23 | Stop reason: HOSPADM

## 2023-06-23 RX ORDER — PROCHLORPERAZINE EDISYLATE 5 MG/ML
5 INJECTION INTRAMUSCULAR; INTRAVENOUS
Status: COMPLETED | OUTPATIENT
Start: 2023-06-23 | End: 2023-06-23

## 2023-06-23 RX ORDER — LIDOCAINE HYDROCHLORIDE 10 MG/ML
1 INJECTION, SOLUTION INFILTRATION; PERINEURAL
Status: DISCONTINUED | OUTPATIENT
Start: 2023-06-23 | End: 2023-06-23 | Stop reason: HOSPADM

## 2023-06-23 RX ORDER — OXYCODONE HYDROCHLORIDE 5 MG/1
10 TABLET ORAL PRN
Status: COMPLETED | OUTPATIENT
Start: 2023-06-23 | End: 2023-06-23

## 2023-06-23 RX ORDER — LIDOCAINE HYDROCHLORIDE 20 MG/ML
INJECTION, SOLUTION EPIDURAL; INFILTRATION; INTRACAUDAL; PERINEURAL PRN
Status: DISCONTINUED | OUTPATIENT
Start: 2023-06-23 | End: 2023-06-23 | Stop reason: SDUPTHER

## 2023-06-23 RX ORDER — OXYCODONE HYDROCHLORIDE 5 MG/1
5 TABLET ORAL PRN
Status: COMPLETED | OUTPATIENT
Start: 2023-06-23 | End: 2023-06-23

## 2023-06-23 RX ORDER — HYDROMORPHONE HYDROCHLORIDE 2 MG/ML
0.5 INJECTION, SOLUTION INTRAMUSCULAR; INTRAVENOUS; SUBCUTANEOUS EVERY 5 MIN PRN
Status: DISCONTINUED | OUTPATIENT
Start: 2023-06-23 | End: 2023-06-23 | Stop reason: HOSPADM

## 2023-06-23 RX ORDER — SODIUM CHLORIDE 0.9 % (FLUSH) 0.9 %
5-40 SYRINGE (ML) INJECTION PRN
Status: DISCONTINUED | OUTPATIENT
Start: 2023-06-23 | End: 2023-06-23 | Stop reason: HOSPADM

## 2023-06-23 RX ORDER — ASPIRIN 81 MG/1
81 TABLET, CHEWABLE ORAL DAILY
COMMUNITY

## 2023-06-23 RX ORDER — DEXAMETHASONE SODIUM PHOSPHATE 4 MG/ML
INJECTION, SOLUTION INTRA-ARTICULAR; INTRALESIONAL; INTRAMUSCULAR; INTRAVENOUS; SOFT TISSUE PRN
Status: DISCONTINUED | OUTPATIENT
Start: 2023-06-23 | End: 2023-06-23 | Stop reason: SDUPTHER

## 2023-06-23 RX ORDER — LIDOCAINE HYDROCHLORIDE AND EPINEPHRINE 10; 10 MG/ML; UG/ML
INJECTION, SOLUTION INFILTRATION; PERINEURAL PRN
Status: DISCONTINUED | OUTPATIENT
Start: 2023-06-23 | End: 2023-06-23 | Stop reason: ALTCHOICE

## 2023-06-23 RX ORDER — BUPIVACAINE HYDROCHLORIDE 2.5 MG/ML
INJECTION, SOLUTION EPIDURAL; INFILTRATION; INTRACAUDAL PRN
Status: DISCONTINUED | OUTPATIENT
Start: 2023-06-23 | End: 2023-06-23 | Stop reason: ALTCHOICE

## 2023-06-23 RX ORDER — ONDANSETRON 2 MG/ML
4 INJECTION INTRAMUSCULAR; INTRAVENOUS
Status: DISCONTINUED | OUTPATIENT
Start: 2023-06-23 | End: 2023-06-23 | Stop reason: HOSPADM

## 2023-06-23 RX ORDER — NEOSTIGMINE METHYLSULFATE 1 MG/ML
INJECTION, SOLUTION INTRAVENOUS PRN
Status: DISCONTINUED | OUTPATIENT
Start: 2023-06-23 | End: 2023-06-23 | Stop reason: SDUPTHER

## 2023-06-23 RX ORDER — GLYCOPYRROLATE 0.2 MG/ML
INJECTION INTRAMUSCULAR; INTRAVENOUS PRN
Status: DISCONTINUED | OUTPATIENT
Start: 2023-06-23 | End: 2023-06-23 | Stop reason: SDUPTHER

## 2023-06-23 RX ORDER — FENTANYL CITRATE 50 UG/ML
INJECTION, SOLUTION INTRAMUSCULAR; INTRAVENOUS PRN
Status: DISCONTINUED | OUTPATIENT
Start: 2023-06-23 | End: 2023-06-23 | Stop reason: SDUPTHER

## 2023-06-23 RX ORDER — OXYCODONE HYDROCHLORIDE AND ACETAMINOPHEN 5; 325 MG/1; MG/1
1 TABLET ORAL EVERY 4 HOURS PRN
Qty: 15 TABLET | Refills: 0 | Status: SHIPPED | OUTPATIENT
Start: 2023-06-23 | End: 2023-06-28

## 2023-06-23 RX ORDER — ONDANSETRON 2 MG/ML
INJECTION INTRAMUSCULAR; INTRAVENOUS PRN
Status: DISCONTINUED | OUTPATIENT
Start: 2023-06-23 | End: 2023-06-23 | Stop reason: SDUPTHER

## 2023-06-23 RX ORDER — PHENYLEPHRINE HYDROCHLORIDE 10 MG/ML
INJECTION INTRAVENOUS PRN
Status: DISCONTINUED | OUTPATIENT
Start: 2023-06-23 | End: 2023-06-23 | Stop reason: SDUPTHER

## 2023-06-23 RX ORDER — PROPOFOL 10 MG/ML
INJECTION, EMULSION INTRAVENOUS PRN
Status: DISCONTINUED | OUTPATIENT
Start: 2023-06-23 | End: 2023-06-23 | Stop reason: SDUPTHER

## 2023-06-23 RX ORDER — DOCUSATE SODIUM 100 MG/1
100 CAPSULE, LIQUID FILLED ORAL 2 TIMES DAILY
Qty: 60 CAPSULE | Refills: 0 | Status: SHIPPED | OUTPATIENT
Start: 2023-06-23 | End: 2023-07-23

## 2023-06-23 RX ADMIN — GLYCOPYRROLATE 0.2 MG: 0.2 INJECTION INTRAMUSCULAR; INTRAVENOUS at 08:04

## 2023-06-23 RX ADMIN — Medication 4 MG: at 09:29

## 2023-06-23 RX ADMIN — PROCHLORPERAZINE EDISYLATE 5 MG: 5 INJECTION INTRAMUSCULAR; INTRAVENOUS at 09:59

## 2023-06-23 RX ADMIN — PHENYLEPHRINE HYDROCHLORIDE 100 MCG: 10 INJECTION INTRAVENOUS at 08:37

## 2023-06-23 RX ADMIN — DEXAMETHASONE SODIUM PHOSPHATE 4 MG: 4 INJECTION, SOLUTION INTRAMUSCULAR; INTRAVENOUS at 08:04

## 2023-06-23 RX ADMIN — LIDOCAINE HYDROCHLORIDE 40 MG: 20 INJECTION, SOLUTION EPIDURAL; INFILTRATION; INTRACAUDAL; PERINEURAL at 08:04

## 2023-06-23 RX ADMIN — FENTANYL CITRATE 100 MCG: 50 INJECTION, SOLUTION INTRAMUSCULAR; INTRAVENOUS at 07:56

## 2023-06-23 RX ADMIN — Medication 10 MG: at 08:48

## 2023-06-23 RX ADMIN — ACETAMINOPHEN 1000 MG: 500 TABLET, FILM COATED ORAL at 06:21

## 2023-06-23 RX ADMIN — SODIUM CHLORIDE, POTASSIUM CHLORIDE, SODIUM LACTATE AND CALCIUM CHLORIDE: 600; 310; 30; 20 INJECTION, SOLUTION INTRAVENOUS at 06:23

## 2023-06-23 RX ADMIN — PROPOFOL 100 MG: 10 INJECTION, EMULSION INTRAVENOUS at 07:56

## 2023-06-23 RX ADMIN — PROPOFOL 70 MG: 10 INJECTION, EMULSION INTRAVENOUS at 07:58

## 2023-06-23 RX ADMIN — ROCURONIUM BROMIDE 35 MG: 10 INJECTION, SOLUTION INTRAVENOUS at 07:57

## 2023-06-23 RX ADMIN — Medication 5 MG: at 08:09

## 2023-06-23 RX ADMIN — GLYCOPYRROLATE 0.6 MG: 0.2 INJECTION INTRAMUSCULAR; INTRAVENOUS at 09:29

## 2023-06-23 RX ADMIN — ONDANSETRON 4 MG: 2 INJECTION INTRAMUSCULAR; INTRAVENOUS at 09:27

## 2023-06-23 RX ADMIN — PHENYLEPHRINE HYDROCHLORIDE 100 MCG: 10 INJECTION INTRAVENOUS at 08:05

## 2023-06-23 RX ADMIN — Medication 2000 MG: at 08:05

## 2023-06-23 RX ADMIN — ROCURONIUM BROMIDE 5 MG: 10 INJECTION, SOLUTION INTRAVENOUS at 09:00

## 2023-06-23 RX ADMIN — OXYCODONE HYDROCHLORIDE 5 MG: 5 TABLET ORAL at 09:57

## 2023-06-23 RX ADMIN — Medication 5 MG: at 08:37

## 2023-06-23 ASSESSMENT — PAIN - FUNCTIONAL ASSESSMENT
PAIN_FUNCTIONAL_ASSESSMENT: 0-10

## 2023-06-23 NOTE — ANESTHESIA PROCEDURE NOTES
Airway  Date/Time: 6/23/2023 7:59 AM  Urgency: elective      General Information and Staff    Patient location during procedure: OR  Anesthesiologist: Karla Sanchez MD  Resident/CRNA: KEVEN Urbano - CRNA  Performed: resident/CRNA     Indications and Patient Condition  Indications for airway management: anesthesia  Spontaneous Ventilation: absent  Sedation level: deep  Preoxygenated: yes  Patient position: sniffing  MILS not maintained throughout  Mask difficulty assessment: vent by bag mask + OA or adjuvant +/- NMBA    Final Airway Details  Final airway type: endotracheal airway      Successful airway: ETT  Cuffed: yes   Successful intubation technique: direct laryngoscopy  Facilitating devices/methods: intubating stylet  Blade: Marvin  Blade size: #3  ETT size (mm): 7.0  Cormack-Lehane Classification: grade I - full view of glottis  Placement verified by: chest auscultation, capnometry and palpation of cuff   Measured from: lips  ETT to lips (cm): 22  Number of attempts at approach: 1  Ventilation between attempts: bag mask  Number of other approaches attempted: 0

## 2023-06-23 NOTE — ANESTHESIA POSTPROCEDURE EVALUATION
Department of Anesthesiology  Postprocedure Note    Patient: Dianne Womack  MRN: [id number]  YOB: 1940  Date of evaluation: 6/23/2023      Procedure Summary     Date: 06/23/23 Room / Location: [facility] MAIN OR  / [facility] MAIN OR    Anesthesia Start: 0748 Anesthesia Stop: 0945    Procedure: ROBOTIC DIAGNOSTIC LAPAROSCOPY AND LYSIS OF ADHESIONS (Bilateral: Abdomen) Diagnosis:       Bilateral inguinal hernia      (Bilateral inguinal hernia [K40.20])    Providers: Ishaan Brown DO Responsible Provider: Lia Gary MD    Anesthesia Type: general ASA Status: 3          Anesthesia Type: No value filed. Yessica Phase I: Yessica Score: 10    Yessica Phase II:        Anesthesia Post Evaluation    Patient location during evaluation: PACU  Patient participation: complete - patient participated  Level of consciousness: awake and alert  Airway patency: patent  Nausea & Vomiting: no nausea and no vomiting  Complications: no  Cardiovascular status: hemodynamically stable  Respiratory status: acceptable  Hydration status: euvolemic  Comments: Blood pressure (!) 121/57, pulse 83, temperature 98.4 °F (36.9 °C), temperature source Temporal, resp. rate 15, height 5' 7\" (1.702 m), weight 170 lb 8 oz (77.3 kg), SpO2 90 %.       Pt stable for discharge from PACU  Multimodal analgesia pain management approach

## 2023-06-23 NOTE — ANESTHESIA PRE PROCEDURE
ringers IV soln infusion   IntraVENous Continuous L Darcus Litten, MD 75 mL/hr at 06/23/23 0623 New Bag at 06/23/23 0623    sodium chloride flush 0.9 % injection 5-40 mL  5-40 mL IntraVENous 2 times per day L Darcus Litten, MD        sodium chloride flush 0.9 % injection 5-40 mL  5-40 mL IntraVENous PRN L Darcus Litten, MD        0.9 % sodium chloride infusion   IntraVENous PRN L Darcus Litten, MD        sodium chloride flush 0.9 % injection 5-40 mL  5-40 mL IntraVENous 2 times per day Luke Rashel, DO        sodium chloride flush 0.9 % injection 5-40 mL  5-40 mL IntraVENous PRN Luke Rashel, DO        0.9 % sodium chloride infusion   IntraVENous PRN Luke Rashel, DO        ceFAZolin (ANCEF) 2000 mg in sterile water 20 mL IV syringe  2,000 mg IntraVENous On Call to [address] [place] [address], DO           Allergies:     Allergies   Allergen Reactions    Atorvastatin Other (See Comments)    Codeine Hives    Gabapentin Hives and Itching    Lisinopril Other (See Comments)     Ace cough    Pravastatin Other (See Comments)    Sulfa Antibiotics Other (See Comments)     Pt takes Celebrex without problems    Sulfur Hives    Esomeprazole Rash     All over rash and itching     Rosuvastatin Rash       Problem List:    Patient Active Problem List   Diagnosis Code    Coronary artery disease involving native coronary artery without angina pectoris I25.10    Diverticulitis large intestine w/o perforation or abscess w/o bleeding K57.32    Type 2 diabetes with nephropathy (HCC) E11.21    Cognitive disorder F09    Primary osteoarthritis of both knees M17.0    Hx of bilateral mastectomy Z90.13    Acute pain of right knee M25.561    Postmenopausal atrophic vaginitis N95.2    Allergic rhinitis J30.9    Esophageal reflux K21.9    Major depressive disorder, recurrent episode, mild (HCC) F33.0    Dyspnea R06.00    Anemia D64.9    ACE inhibitor intolerance Z78.9    Post PTCA

## 2023-06-23 NOTE — PROGRESS NOTES
Pre-op prayer request received. Prayer and support given to patient and daughter. Rev.  LAUREN Padilla

## 2023-06-23 NOTE — OP NOTE
Laparoscopic Robotic lysis of adhesions Procedure Note      Name:  Josiephine Gottron Date/Time of Admission: 2023  5:47 AM  CSN: [id number]  Attending Provider: Sen Rodriguez, *  MRN:  [id number]  : 1940 [de-identified] y.o. Pre-operative Diagnosis: bilateral Inguinal Hernia    Post-operative Diagnosis: bilateral cord lipomas, no evidence of inguinal hernias, significant abdominal adhesions    Procedure: Laparoscopic Robotic lysis of adhesions    Surgeon: Saurabh Cortés DO    Anesthesia: General    Indications: Josiephine Gottron is a [de-identified] y.o. female with abd pain. Pt noted to have fat containing inguinal hernias. Procedure Details: The patient was correctly identified in preoperative holding area. Consent was confirmed and placed on the chart. Preoperative antibiotics were administered per SCIP protocol. The patient was then taken back to the operative suite and placed in the supine position. General anesthesia was performed per anesthesia via an endotracheal tube. The patient's groin was then prepped and draped in the usual sterile fashion. A timeout was performed and all members of the team that were present were in agreement. The procedure began by locating a spot approximately 2 fingerbreadths below the patient's left subcostal margin here this area was locally anesthetized. Next using a 15 blade scalpel and incision was made through skin down subcutaneous tissues below. A 5 mm Optiview trocar with a 5 mm 0° laparoscope was placed into this incision and the abdomen was entered under direct visualization while visualizing all layers of the abdominal wall. After entry into the abdomen, insufflation was attached. The obturator was removed and a 5 mm 30° laparoscopic camera was reinserted. Pneumoperitoneum was achieved and the contents of the abdomen were inspected and found to be free of any defects or harm.      At this point in time a thorough investigation of the abdomen was

## 2023-06-23 NOTE — DISCHARGE INSTRUCTIONS
CALL YOUR DOCTOR IF   Excessive bleeding that does not stop after holding pressure over the area  Temperature of 101 degrees F or above  Excessive redness, swelling or bruising, and/ or green or yellow, smelly discharge from incision    After general anesthesia or intravenous sedation, for 24 hours or while taking prescription Narcotics:  Limit your activities  A responsible adult needs to be with you for the next 24 hours  Do not drive and operate hazardous machinery  Do not make important personal or business decisions  Do not drink alcoholic beverages  If you have not urinated within 8 hours after discharge, and you are experiencing discomfort from urinary retention, please go to the nearest ED. If you have sleep apnea and have a CPAP machine, please use it for all naps and sleeping. Please use caution when taking narcotics and any of your home medications that may cause drowsiness. *  Please give a list of your current medications to your Primary Care Provider. *  Please update this list whenever your medications are discontinued, doses are      changed, or new medications (including over-the-counter products) are added. *  Please carry medication information at all times in case of emergency situations. These are general instructions for a healthy lifestyle:  No smoking/ No tobacco products/ Avoid exposure to second hand smoke  Surgeon General's Warning:  Quitting smoking now greatly reduces serious risk to your health. Obesity, smoking, and sedentary lifestyle greatly increases your risk for illness  A healthy diet, regular physical exercise & weight monitoring are important for maintaining a healthy lifestyle    You may be retaining fluid if you have a history of heart failure or if you experience any of the following symptoms:  Weight gain of 3 pounds or more overnight or 5 pounds in a week, increased swelling in our hands or feet or shortness of breath while lying flat in bed.   Please call

## 2023-07-03 ENCOUNTER — OFFICE VISIT (OUTPATIENT)
Dept: SURGERY | Age: 83
End: 2023-07-03

## 2023-07-03 DIAGNOSIS — Z09 POSTOPERATIVE EXAMINATION: Primary | ICD-10-CM

## 2023-07-03 DIAGNOSIS — K40.20 NON-RECURRENT BILATERAL INGUINAL HERNIA WITHOUT OBSTRUCTION OR GANGRENE: ICD-10-CM

## 2023-07-03 PROCEDURE — 99024 POSTOP FOLLOW-UP VISIT: CPT

## 2023-07-03 NOTE — PATIENT INSTRUCTIONS
1. Follow-up as needed    2. 3 weeks after surgery, lift nothing heavier than 25 pounds. 3 weeks after surgery, return to full activity    3. Regular diet  4. Do not get constipated. No more than 2 days without a bm. If 2 days no bm, then take colace stool softener twice a day. If 24 hours of colace does not produce a bm , then keep taking colace and add miralax laxative twice a day until you have a bm. Once you are having regular bms, you can use colace and miralax as needed.

## 2023-07-03 NOTE — PROGRESS NOTES
[facility]  [phone]        poDate: 7/3/2023      Name: Millie Regan      MRN: [id number]       : 1940       Age: 82 y.o. Sex: female        [address], APRN - CNP       CC:  postoperative check       HPI:  The patient presents for a post-op visit s/p  Laparoscopic Robotic lysis of adhesions. Mary Parks DO.23 Post-operative Diagnosis: bilateral cord lipomas, no evidence of inguinal hernias, significant abdominal adhesions     Physical Exam:     There were no vitals taken for this visit. General: Alert, oriented, cooperative and in no acute distress. Neck: Supple, trachea midline, no appreciable thyromegaly  Resp: No JVD. Breathing is  non-labored. Lungs clear to auscultation without wheezing or rhonchi   CV: RRR. No murmurs, rubs or gallops appreciated. Abd: soft non-tender and non-distended without peritoneal signs. +bs    Skin/incision: Laparoscopic sites are clean, dry and intact signs of infection. No fluid collections palpated. .    Assessment/Plan:  Millie Regan is a 82 y.o. female who is s/p Laparoscopic Robotic lysis of adhesions. Mary DO Kasey.23 Post-operative Diagnosis: bilateral cord lipomas, no evidence of inguinal hernias, significant abdominal adhesions    1. Follow-up as needed    2. 3 weeks after surgery, lift nothing heavier than 25 pounds. 3 weeks after surgery, return to full activity    3. Regular diet  4. Do not get constipated. No more than 2 days without a bm. If 2 days no bm, then take colace stool softener twice a day. If 24 hours of colace does not produce a bm , then keep taking colace and add miralax laxative twice a day until you have a bm. Once you are having regular bms, you can use colace and miralax as needed.       Signed: KEVEN Pino NP    7/3/2023  4:09 PM

## 2023-10-26 ENCOUNTER — OFFICE VISIT (OUTPATIENT)
Dept: FAMILY MEDICINE CLINIC | Facility: CLINIC | Age: 83
End: 2023-10-26
Payer: MEDICARE

## 2023-10-26 VITALS
SYSTOLIC BLOOD PRESSURE: 134 MMHG | DIASTOLIC BLOOD PRESSURE: 64 MMHG | OXYGEN SATURATION: 98 % | HEART RATE: 80 BPM | TEMPERATURE: 97.9 F | WEIGHT: 173 LBS | BODY MASS INDEX: 27.1 KG/M2

## 2023-10-26 DIAGNOSIS — E78.2 MIXED HYPERLIPIDEMIA: ICD-10-CM

## 2023-10-26 DIAGNOSIS — Z00.00 ENCOUNTER FOR SUBSEQUENT ANNUAL WELLNESS VISIT (AWV) IN MEDICARE PATIENT: ICD-10-CM

## 2023-10-26 DIAGNOSIS — I10 PRIMARY HYPERTENSION: ICD-10-CM

## 2023-10-26 DIAGNOSIS — F32.A DEPRESSION, UNSPECIFIED DEPRESSION TYPE: ICD-10-CM

## 2023-10-26 DIAGNOSIS — E11.22 TYPE 2 DIABETES MELLITUS WITH CHRONIC KIDNEY DISEASE, WITHOUT LONG-TERM CURRENT USE OF INSULIN, UNSPECIFIED CKD STAGE (HCC): Primary | ICD-10-CM

## 2023-10-26 DIAGNOSIS — Z11.59 NEED FOR HEPATITIS C SCREENING TEST: ICD-10-CM

## 2023-10-26 DIAGNOSIS — F33.1 MAJOR DEPRESSIVE DISORDER, RECURRENT, MODERATE (HCC): ICD-10-CM

## 2023-10-26 LAB
ALBUMIN SERPL-MCNC: 3.8 G/DL (ref 3.2–4.6)
ALBUMIN/GLOB SERPL: 1.2 (ref 0.4–1.6)
ALP SERPL-CCNC: 99 U/L (ref 50–136)
ALT SERPL-CCNC: 24 U/L (ref 12–65)
ANION GAP SERPL CALC-SCNC: 6 MMOL/L (ref 2–11)
AST SERPL-CCNC: 17 U/L (ref 15–37)
BASOPHILS # BLD: 0.1 K/UL (ref 0–0.2)
BASOPHILS NFR BLD: 1 % (ref 0–2)
BILIRUB SERPL-MCNC: 0.4 MG/DL (ref 0.2–1.1)
BUN SERPL-MCNC: 17 MG/DL (ref 8–23)
CALCIUM SERPL-MCNC: 9.6 MG/DL (ref 8.3–10.4)
CHLORIDE SERPL-SCNC: 104 MMOL/L (ref 101–110)
CHOLEST SERPL-MCNC: 246 MG/DL
CO2 SERPL-SCNC: 29 MMOL/L (ref 21–32)
CREAT SERPL-MCNC: 1.2 MG/DL (ref 0.6–1)
DIFFERENTIAL METHOD BLD: ABNORMAL
EOSINOPHIL # BLD: 0.2 K/UL (ref 0–0.8)
EOSINOPHIL NFR BLD: 3 % (ref 0.5–7.8)
ERYTHROCYTE [DISTWIDTH] IN BLOOD BY AUTOMATED COUNT: 12.8 % (ref 11.9–14.6)
GLOBULIN SER CALC-MCNC: 3.3 G/DL (ref 2.8–4.5)
GLUCOSE SERPL-MCNC: 99 MG/DL (ref 65–100)
HBA1C MFR BLD: 6.5 %
HCT VFR BLD AUTO: 38.6 % (ref 35.8–46.3)
HCV AB SER QL: NONREACTIVE
HDLC SERPL-MCNC: 60 MG/DL (ref 40–60)
HDLC SERPL: 4.1
HGB BLD-MCNC: 12.3 G/DL (ref 11.7–15.4)
IMM GRANULOCYTES # BLD AUTO: 0 K/UL (ref 0–0.5)
IMM GRANULOCYTES NFR BLD AUTO: 0 % (ref 0–5)
LDLC SERPL CALC-MCNC: 138.4 MG/DL
LYMPHOCYTES # BLD: 2.3 K/UL (ref 0.5–4.6)
LYMPHOCYTES NFR BLD: 31 % (ref 13–44)
MCH RBC QN AUTO: 31.7 PG (ref 26.1–32.9)
MCHC RBC AUTO-ENTMCNC: 31.9 G/DL (ref 31.4–35)
MCV RBC AUTO: 99.5 FL (ref 82–102)
MONOCYTES # BLD: 0.6 K/UL (ref 0.1–1.3)
MONOCYTES NFR BLD: 8 % (ref 4–12)
NEUTS SEG # BLD: 4.2 K/UL (ref 1.7–8.2)
NEUTS SEG NFR BLD: 57 % (ref 43–78)
NRBC # BLD: 0 K/UL (ref 0–0.2)
PLATELET # BLD AUTO: 192 K/UL (ref 150–450)
PMV BLD AUTO: 12.3 FL (ref 9.4–12.3)
POTASSIUM SERPL-SCNC: 4.8 MMOL/L (ref 3.5–5.1)
PROT SERPL-MCNC: 7.1 G/DL (ref 6.3–8.2)
RBC # BLD AUTO: 3.88 M/UL (ref 4.05–5.2)
SODIUM SERPL-SCNC: 139 MMOL/L (ref 133–143)
TRIGL SERPL-MCNC: 238 MG/DL (ref 35–150)
VLDLC SERPL CALC-MCNC: 47.6 MG/DL (ref 6–23)
WBC # BLD AUTO: 7.3 K/UL (ref 4.3–11.1)

## 2023-10-26 PROCEDURE — 1090F PRES/ABSN URINE INCON ASSESS: CPT | Performed by: NURSE PRACTITIONER

## 2023-10-26 PROCEDURE — 3078F DIAST BP <80 MM HG: CPT | Performed by: NURSE PRACTITIONER

## 2023-10-26 PROCEDURE — G0439 PPPS, SUBSEQ VISIT: HCPCS | Performed by: NURSE PRACTITIONER

## 2023-10-26 PROCEDURE — G8427 DOCREV CUR MEDS BY ELIG CLIN: HCPCS | Performed by: NURSE PRACTITIONER

## 2023-10-26 PROCEDURE — 99213 OFFICE O/P EST LOW 20 MIN: CPT | Performed by: NURSE PRACTITIONER

## 2023-10-26 PROCEDURE — 1123F ACP DISCUSS/DSCN MKR DOCD: CPT | Performed by: NURSE PRACTITIONER

## 2023-10-26 PROCEDURE — 3075F SYST BP GE 130 - 139MM HG: CPT | Performed by: NURSE PRACTITIONER

## 2023-10-26 PROCEDURE — 1036F TOBACCO NON-USER: CPT | Performed by: NURSE PRACTITIONER

## 2023-10-26 PROCEDURE — G8484 FLU IMMUNIZE NO ADMIN: HCPCS | Performed by: NURSE PRACTITIONER

## 2023-10-26 PROCEDURE — G8417 CALC BMI ABV UP PARAM F/U: HCPCS | Performed by: NURSE PRACTITIONER

## 2023-10-26 PROCEDURE — 3044F HG A1C LEVEL LT 7.0%: CPT | Performed by: NURSE PRACTITIONER

## 2023-10-26 PROCEDURE — G8399 PT W/DXA RESULTS DOCUMENT: HCPCS | Performed by: NURSE PRACTITIONER

## 2023-10-26 PROCEDURE — 83036 HEMOGLOBIN GLYCOSYLATED A1C: CPT | Performed by: NURSE PRACTITIONER

## 2023-10-26 RX ORDER — FLUOXETINE 20 MG/1
20 TABLET ORAL DAILY
Qty: 90 TABLET | Refills: 1 | Status: SHIPPED | OUTPATIENT
Start: 2023-10-26

## 2023-10-26 RX ORDER — HYDROCHLOROTHIAZIDE 25 MG/1
25 TABLET ORAL DAILY
COMMUNITY

## 2023-10-26 RX ORDER — SIMVASTATIN 20 MG
20 TABLET ORAL NIGHTLY
Qty: 90 TABLET | Refills: 1 | Status: SHIPPED | OUTPATIENT
Start: 2023-10-26

## 2023-10-26 RX ORDER — LOSARTAN POTASSIUM 25 MG/1
25 TABLET ORAL DAILY
COMMUNITY
End: 2023-10-26 | Stop reason: SDUPTHER

## 2023-10-26 RX ORDER — LOSARTAN POTASSIUM 25 MG/1
25 TABLET ORAL DAILY
Qty: 90 TABLET | Refills: 1 | Status: SHIPPED | OUTPATIENT
Start: 2023-10-26

## 2023-10-26 ASSESSMENT — ENCOUNTER SYMPTOMS
EYES NEGATIVE: 1
RESPIRATORY NEGATIVE: 1
ALLERGIC/IMMUNOLOGIC NEGATIVE: 1
GASTROINTESTINAL NEGATIVE: 1

## 2023-10-26 ASSESSMENT — COLUMBIA-SUICIDE SEVERITY RATING SCALE - C-SSRS
4. HAVE YOU HAD THESE THOUGHTS AND HAD SOME INTENTION OF ACTING ON THEM?: NO
7. DID THIS OCCUR IN THE LAST THREE MONTHS: NO
3. HAVE YOU BEEN THINKING ABOUT HOW YOU MIGHT KILL YOURSELF?: NO
5. HAVE YOU STARTED TO WORK OUT OR WORKED OUT THE DETAILS OF HOW TO KILL YOURSELF? DO YOU INTEND TO CARRY OUT THIS PLAN?: NO

## 2023-10-26 ASSESSMENT — PATIENT HEALTH QUESTIONNAIRE - PHQ9
8. MOVING OR SPEAKING SO SLOWLY THAT OTHER PEOPLE COULD HAVE NOTICED. OR THE OPPOSITE, BEING SO FIGETY OR RESTLESS THAT YOU HAVE BEEN MOVING AROUND A LOT MORE THAN USUAL: 0
2. FEELING DOWN, DEPRESSED OR HOPELESS: 0
10. IF YOU CHECKED OFF ANY PROBLEMS, HOW DIFFICULT HAVE THESE PROBLEMS MADE IT FOR YOU TO DO YOUR WORK, TAKE CARE OF THINGS AT HOME, OR GET ALONG WITH OTHER PEOPLE: 0
3. TROUBLE FALLING OR STAYING ASLEEP: 0
1. LITTLE INTEREST OR PLEASURE IN DOING THINGS: 0
SUM OF ALL RESPONSES TO PHQ QUESTIONS 1-9: 0
SUM OF ALL RESPONSES TO PHQ QUESTIONS 1-9: 0
7. TROUBLE CONCENTRATING ON THINGS, SUCH AS READING THE NEWSPAPER OR WATCHING TELEVISION: 0
SUM OF ALL RESPONSES TO PHQ QUESTIONS 1-9: 0
5. POOR APPETITE OR OVEREATING: 0
4. FEELING TIRED OR HAVING LITTLE ENERGY: 0
SUM OF ALL RESPONSES TO PHQ9 QUESTIONS 1 & 2: 0
6. FEELING BAD ABOUT YOURSELF - OR THAT YOU ARE A FAILURE OR HAVE LET YOURSELF OR YOUR FAMILY DOWN: 0
SUM OF ALL RESPONSES TO PHQ QUESTIONS 1-9: 0
9. THOUGHTS THAT YOU WOULD BE BETTER OFF DEAD, OR OF HURTING YOURSELF: 0

## 2023-10-26 NOTE — PROGRESS NOTES
Take 1 tablet by mouth nightly  Patient not taking: Reported on 10/26/2023  Mary Bunn APRN - CNP   hydrOXYzine HCl (ATARAX) 10 MG tablet Take 1 tablet by mouth 3 times daily as needed for Itching  Patient not taking: Reported on 6/23/2023  Rebecca Cardenas MD   albuterol sulfate HFA (PROVENTIL;VENTOLIN;PROAIR) 108 (90 Base) MCG/ACT inhaler Inhale 1 puff into the lungs every 6 hours as needed for Shortness of Breath  Patient not taking: Reported on 6/23/2023  Virginia Bunn APRN - CNP       CareTeam (Including outside providers/suppliers regularly involved in providing care):   Patient Care Team:  KEVEN Joseph CNP as PCP - General  Virginia Bunn APRN - CNP as PCP - Empaneled Provider  KEVEN Juarez NP as Nurse Practitioner  Kiersten Deras MD as Physician     Reviewed and updated this visit:  Allergies  Meds

## 2023-10-27 NOTE — RESULT ENCOUNTER NOTE
Results to patient please. Total cholesterol is 246 and it was 168. Triglycerides are 238 and they were 124. HDL 60. LDL was 79.2 and is now 138.4. Has patient missed any doses of her statin? If so, please encourage compliance. If not, please let me know. Please also continue a low-cholesterol diet. Glucose 99. Creatinine is slightly elevated; however, she is under the care of nephrology. Avoid NSAIDs and remain hydrated.   Liver enzymes are normal.   Thanks

## 2024-01-30 ENCOUNTER — OFFICE VISIT (OUTPATIENT)
Dept: FAMILY MEDICINE CLINIC | Facility: CLINIC | Age: 84
End: 2024-01-30
Payer: MEDICARE

## 2024-01-30 VITALS
WEIGHT: 174.2 LBS | DIASTOLIC BLOOD PRESSURE: 58 MMHG | TEMPERATURE: 98.1 F | OXYGEN SATURATION: 97 % | HEIGHT: 67 IN | HEART RATE: 74 BPM | BODY MASS INDEX: 27.34 KG/M2 | SYSTOLIC BLOOD PRESSURE: 116 MMHG

## 2024-01-30 DIAGNOSIS — J00 ACUTE RHINITIS: ICD-10-CM

## 2024-01-30 DIAGNOSIS — E78.2 MIXED HYPERLIPIDEMIA: ICD-10-CM

## 2024-01-30 DIAGNOSIS — E11.22 TYPE 2 DIABETES MELLITUS WITH CHRONIC KIDNEY DISEASE, WITHOUT LONG-TERM CURRENT USE OF INSULIN, UNSPECIFIED CKD STAGE (HCC): ICD-10-CM

## 2024-01-30 DIAGNOSIS — I10 PRIMARY HYPERTENSION: ICD-10-CM

## 2024-01-30 DIAGNOSIS — E11.22 TYPE 2 DIABETES MELLITUS WITH CHRONIC KIDNEY DISEASE, WITHOUT LONG-TERM CURRENT USE OF INSULIN, UNSPECIFIED CKD STAGE (HCC): Primary | ICD-10-CM

## 2024-01-30 LAB
ALBUMIN SERPL-MCNC: 3.9 G/DL (ref 3.2–4.6)
ALBUMIN/GLOB SERPL: 1.3 (ref 0.4–1.6)
ALP SERPL-CCNC: 90 U/L (ref 50–136)
ALT SERPL-CCNC: 20 U/L (ref 12–65)
ANION GAP SERPL CALC-SCNC: 7 MMOL/L (ref 2–11)
AST SERPL-CCNC: 19 U/L (ref 15–37)
BASOPHILS # BLD: 0.1 K/UL (ref 0–0.2)
BASOPHILS NFR BLD: 1 % (ref 0–2)
BILIRUB SERPL-MCNC: 0.3 MG/DL (ref 0.2–1.1)
BUN SERPL-MCNC: 20 MG/DL (ref 8–23)
CALCIUM SERPL-MCNC: 9.5 MG/DL (ref 8.3–10.4)
CHLORIDE SERPL-SCNC: 103 MMOL/L (ref 103–113)
CHOLEST SERPL-MCNC: 164 MG/DL
CK SERPL-CCNC: 71 U/L (ref 21–215)
CO2 SERPL-SCNC: 26 MMOL/L (ref 21–32)
CREAT SERPL-MCNC: 1.3 MG/DL (ref 0.6–1)
DIFFERENTIAL METHOD BLD: ABNORMAL
EOSINOPHIL # BLD: 0.3 K/UL (ref 0–0.8)
EOSINOPHIL NFR BLD: 4 % (ref 0.5–7.8)
ERYTHROCYTE [DISTWIDTH] IN BLOOD BY AUTOMATED COUNT: 12.3 % (ref 11.9–14.6)
GLOBULIN SER CALC-MCNC: 3.1 G/DL (ref 2.8–4.5)
GLUCOSE SERPL-MCNC: 181 MG/DL (ref 65–100)
HCT VFR BLD AUTO: 37.6 % (ref 35.8–46.3)
HDLC SERPL-MCNC: 52 MG/DL (ref 40–60)
HDLC SERPL: 3.2
HGB BLD-MCNC: 12.1 G/DL (ref 11.7–15.4)
IMM GRANULOCYTES # BLD AUTO: 0 K/UL (ref 0–0.5)
IMM GRANULOCYTES NFR BLD AUTO: 1 % (ref 0–5)
LDLC SERPL CALC-MCNC: 65.2 MG/DL
LYMPHOCYTES # BLD: 2.1 K/UL (ref 0.5–4.6)
LYMPHOCYTES NFR BLD: 33 % (ref 13–44)
MCH RBC QN AUTO: 32.4 PG (ref 26.1–32.9)
MCHC RBC AUTO-ENTMCNC: 32.2 G/DL (ref 31.4–35)
MCV RBC AUTO: 100.5 FL (ref 82–102)
MONOCYTES # BLD: 0.4 K/UL (ref 0.1–1.3)
MONOCYTES NFR BLD: 5 % (ref 4–12)
NEUTS SEG # BLD: 3.6 K/UL (ref 1.7–8.2)
NEUTS SEG NFR BLD: 56 % (ref 43–78)
NRBC # BLD: 0 K/UL (ref 0–0.2)
PLATELET # BLD AUTO: 168 K/UL (ref 150–450)
PMV BLD AUTO: 12.8 FL (ref 9.4–12.3)
POTASSIUM SERPL-SCNC: 3.7 MMOL/L (ref 3.5–5.1)
PROT SERPL-MCNC: 7 G/DL (ref 6.3–8.2)
RBC # BLD AUTO: 3.74 M/UL (ref 4.05–5.2)
SODIUM SERPL-SCNC: 136 MMOL/L (ref 136–146)
TRIGL SERPL-MCNC: 234 MG/DL (ref 35–150)
VLDLC SERPL CALC-MCNC: 46.8 MG/DL (ref 6–23)
WBC # BLD AUTO: 6.5 K/UL (ref 4.3–11.1)

## 2024-01-30 PROCEDURE — 1036F TOBACCO NON-USER: CPT | Performed by: NURSE PRACTITIONER

## 2024-01-30 PROCEDURE — 1123F ACP DISCUSS/DSCN MKR DOCD: CPT | Performed by: NURSE PRACTITIONER

## 2024-01-30 PROCEDURE — G8399 PT W/DXA RESULTS DOCUMENT: HCPCS | Performed by: NURSE PRACTITIONER

## 2024-01-30 PROCEDURE — 1090F PRES/ABSN URINE INCON ASSESS: CPT | Performed by: NURSE PRACTITIONER

## 2024-01-30 PROCEDURE — G8484 FLU IMMUNIZE NO ADMIN: HCPCS | Performed by: NURSE PRACTITIONER

## 2024-01-30 PROCEDURE — 99214 OFFICE O/P EST MOD 30 MIN: CPT | Performed by: NURSE PRACTITIONER

## 2024-01-30 PROCEDURE — G8417 CALC BMI ABV UP PARAM F/U: HCPCS | Performed by: NURSE PRACTITIONER

## 2024-01-30 PROCEDURE — 3074F SYST BP LT 130 MM HG: CPT | Performed by: NURSE PRACTITIONER

## 2024-01-30 PROCEDURE — G8427 DOCREV CUR MEDS BY ELIG CLIN: HCPCS | Performed by: NURSE PRACTITIONER

## 2024-01-30 PROCEDURE — 3078F DIAST BP <80 MM HG: CPT | Performed by: NURSE PRACTITIONER

## 2024-01-30 RX ORDER — FLUTICASONE PROPIONATE 50 MCG
2 SPRAY, SUSPENSION (ML) NASAL DAILY
Qty: 16 G | Refills: 3 | Status: SHIPPED | OUTPATIENT
Start: 2024-01-30

## 2024-01-30 RX ORDER — ALBUTEROL SULFATE 90 UG/1
1 AEROSOL, METERED RESPIRATORY (INHALATION) EVERY 6 HOURS PRN
Qty: 18 G | Refills: 3 | Status: SHIPPED | OUTPATIENT
Start: 2024-01-30

## 2024-01-30 RX ORDER — HYDROCHLOROTHIAZIDE 25 MG/1
25 TABLET ORAL DAILY
Qty: 90 TABLET | Refills: 1 | Status: SHIPPED | OUTPATIENT
Start: 2024-01-30

## 2024-01-30 RX ORDER — LOSARTAN POTASSIUM 25 MG/1
25 TABLET ORAL DAILY
Qty: 90 TABLET | Refills: 1 | Status: SHIPPED | OUTPATIENT
Start: 2024-01-30

## 2024-01-30 RX ORDER — SIMVASTATIN 20 MG
20 TABLET ORAL NIGHTLY
Qty: 90 TABLET | Refills: 1 | Status: SHIPPED | OUTPATIENT
Start: 2024-01-30

## 2024-01-30 ASSESSMENT — PATIENT HEALTH QUESTIONNAIRE - PHQ9
SUM OF ALL RESPONSES TO PHQ QUESTIONS 1-9: 0
10. IF YOU CHECKED OFF ANY PROBLEMS, HOW DIFFICULT HAVE THESE PROBLEMS MADE IT FOR YOU TO DO YOUR WORK, TAKE CARE OF THINGS AT HOME, OR GET ALONG WITH OTHER PEOPLE: 0
3. TROUBLE FALLING OR STAYING ASLEEP: 0
5. POOR APPETITE OR OVEREATING: 0
SUM OF ALL RESPONSES TO PHQ QUESTIONS 1-9: 0
SUM OF ALL RESPONSES TO PHQ9 QUESTIONS 1 & 2: 0
8. MOVING OR SPEAKING SO SLOWLY THAT OTHER PEOPLE COULD HAVE NOTICED. OR THE OPPOSITE, BEING SO FIGETY OR RESTLESS THAT YOU HAVE BEEN MOVING AROUND A LOT MORE THAN USUAL: 0
SUM OF ALL RESPONSES TO PHQ QUESTIONS 1-9: 0
4. FEELING TIRED OR HAVING LITTLE ENERGY: 0
7. TROUBLE CONCENTRATING ON THINGS, SUCH AS READING THE NEWSPAPER OR WATCHING TELEVISION: 0
9. THOUGHTS THAT YOU WOULD BE BETTER OFF DEAD, OR OF HURTING YOURSELF: 0
2. FEELING DOWN, DEPRESSED OR HOPELESS: 0
SUM OF ALL RESPONSES TO PHQ QUESTIONS 1-9: 0
6. FEELING BAD ABOUT YOURSELF - OR THAT YOU ARE A FAILURE OR HAVE LET YOURSELF OR YOUR FAMILY DOWN: 0
1. LITTLE INTEREST OR PLEASURE IN DOING THINGS: 0

## 2024-01-30 ASSESSMENT — ENCOUNTER SYMPTOMS
GASTROINTESTINAL NEGATIVE: 1
EYES NEGATIVE: 1
RESPIRATORY NEGATIVE: 1
ALLERGIC/IMMUNOLOGIC NEGATIVE: 1

## 2024-01-31 LAB
EST. AVERAGE GLUCOSE BLD GHB EST-MCNC: 131 MG/DL
HBA1C MFR BLD: 6.2 % (ref 4.8–5.6)

## 2024-01-31 NOTE — RESULT ENCOUNTER NOTE
Results to patient please.  Total cholesterol is 164 was 246.  Triglycerides are 234 were 238.  HDL is 52.  LDL 65.2 and was 138.4.  Keep up the good work.  Glucose is 181.  A1c is pending.  Creatinine is 1.30 and is slightly increased.  Please avoid NSAIDs and remain hydrated.  Please confirm that she has seen nephrology.  GFR is slightly decreased. Liver enzymes are normal.  Thanks

## 2024-02-01 DIAGNOSIS — R79.89 ELEVATED SERUM CREATININE: Primary | ICD-10-CM

## 2024-02-14 ENCOUNTER — OFFICE VISIT (OUTPATIENT)
Dept: FAMILY MEDICINE CLINIC | Facility: CLINIC | Age: 84
End: 2024-02-14
Payer: MEDICARE

## 2024-02-14 VITALS
HEART RATE: 60 BPM | DIASTOLIC BLOOD PRESSURE: 62 MMHG | SYSTOLIC BLOOD PRESSURE: 128 MMHG | WEIGHT: 173 LBS | BODY MASS INDEX: 27.15 KG/M2 | TEMPERATURE: 98.2 F | OXYGEN SATURATION: 91 % | HEIGHT: 67 IN

## 2024-02-14 DIAGNOSIS — R10.9 ABDOMINAL PAIN, UNSPECIFIED ABDOMINAL LOCATION: ICD-10-CM

## 2024-02-14 DIAGNOSIS — E87.5 HYPERKALEMIA: ICD-10-CM

## 2024-02-14 DIAGNOSIS — I50.32 DIASTOLIC CHF, CHRONIC (HCC): ICD-10-CM

## 2024-02-14 DIAGNOSIS — F33.1 MAJOR DEPRESSIVE DISORDER, RECURRENT, MODERATE (HCC): ICD-10-CM

## 2024-02-14 DIAGNOSIS — K40.21 BILATERAL RECURRENT INGUINAL HERNIA WITHOUT OBSTRUCTION OR GANGRENE: Primary | ICD-10-CM

## 2024-02-14 DIAGNOSIS — N18.31 STAGE 3A CHRONIC KIDNEY DISEASE (HCC): ICD-10-CM

## 2024-02-14 LAB
BASOPHILS # BLD: 0.1 K/UL (ref 0–0.2)
BASOPHILS NFR BLD: 1 % (ref 0–2)
DIFFERENTIAL METHOD BLD: ABNORMAL
EOSINOPHIL # BLD: 0.4 K/UL (ref 0–0.8)
EOSINOPHIL NFR BLD: 5 % (ref 0.5–7.8)
ERYTHROCYTE [DISTWIDTH] IN BLOOD BY AUTOMATED COUNT: 12.2 % (ref 11.9–14.6)
HCT VFR BLD AUTO: 36.7 % (ref 35.8–46.3)
HGB BLD-MCNC: 11.6 G/DL (ref 11.7–15.4)
IMM GRANULOCYTES # BLD AUTO: 0 K/UL (ref 0–0.5)
IMM GRANULOCYTES NFR BLD AUTO: 0 % (ref 0–5)
LYMPHOCYTES # BLD: 2.5 K/UL (ref 0.5–4.6)
LYMPHOCYTES NFR BLD: 34 % (ref 13–44)
MCH RBC QN AUTO: 31.9 PG (ref 26.1–32.9)
MCHC RBC AUTO-ENTMCNC: 31.6 G/DL (ref 31.4–35)
MCV RBC AUTO: 100.8 FL (ref 82–102)
MONOCYTES # BLD: 0.5 K/UL (ref 0.1–1.3)
MONOCYTES NFR BLD: 7 % (ref 4–12)
NEUTS SEG # BLD: 3.9 K/UL (ref 1.7–8.2)
NEUTS SEG NFR BLD: 53 % (ref 43–78)
NRBC # BLD: 0 K/UL (ref 0–0.2)
PLATELET # BLD AUTO: 190 K/UL (ref 150–450)
PMV BLD AUTO: 12.8 FL (ref 9.4–12.3)
POTASSIUM SERPL-SCNC: 5.3 MMOL/L (ref 3.5–5.1)
RBC # BLD AUTO: 3.64 M/UL (ref 4.05–5.2)
WBC # BLD AUTO: 7.4 K/UL (ref 4.3–11.1)

## 2024-02-14 PROCEDURE — G8417 CALC BMI ABV UP PARAM F/U: HCPCS | Performed by: NURSE PRACTITIONER

## 2024-02-14 PROCEDURE — G8427 DOCREV CUR MEDS BY ELIG CLIN: HCPCS | Performed by: NURSE PRACTITIONER

## 2024-02-14 PROCEDURE — 99214 OFFICE O/P EST MOD 30 MIN: CPT | Performed by: NURSE PRACTITIONER

## 2024-02-14 PROCEDURE — 1090F PRES/ABSN URINE INCON ASSESS: CPT | Performed by: NURSE PRACTITIONER

## 2024-02-14 PROCEDURE — 1123F ACP DISCUSS/DSCN MKR DOCD: CPT | Performed by: NURSE PRACTITIONER

## 2024-02-14 PROCEDURE — G8399 PT W/DXA RESULTS DOCUMENT: HCPCS | Performed by: NURSE PRACTITIONER

## 2024-02-14 PROCEDURE — G8484 FLU IMMUNIZE NO ADMIN: HCPCS | Performed by: NURSE PRACTITIONER

## 2024-02-14 PROCEDURE — 1036F TOBACCO NON-USER: CPT | Performed by: NURSE PRACTITIONER

## 2024-02-14 NOTE — PROGRESS NOTES
[facility] [place] [place]  [address]  [place] [address]   () [phone] (fax) [phone]  MAGNO Fontenot      Chief Complaint   Patient presents with    Abdominal Pain    Diarrhea    Stool Color Change     Dark at times       82 yo female comes in requesting to go back to a surgeon regarding her hernia repair from last year. Pt presented to Dr. Yap last June with concerns about abilateral inguinal hernias.  Patient stated that she was having some right abdominal pain and underwent a CT scan which was noted to have bilateral fat-containing inguinal hernias.  Patient had a previous open appendectomy as well as a midline laparotomy for a hysterectomy.  Denied any nausea or vomiting or issues with her bowels or bladder. Pt then underwent robotic bilateral inguinal hernia repair with mesh on 06/23/23. She reports that abdomen has remained distended since then and she feels something is not right. She c/o abdominal discomfort and pain. Reports that stools are sometimes very dark. She does not know if the mesh is causing problems or what.  Reports that Dr. Yap has since left the group. She is requesting someone new.         Allergies   Allergen Reactions    Atorvastatin Other (See Comments)    Codeine Hives    Gabapentin Hives and Itching    Lisinopril Other (See Comments)     Ace cough    Pravastatin Other (See Comments)    Sulfa Antibiotics Other (See Comments)     Pt takes Celebrex without problems    Sulfur Hives    Esomeprazole Rash     All over rash and itching     Rosuvastatin Rash       Past Medical History:   Diagnosis Date    Abnormal EKG     ACE inhibitor intolerance 2014    cough with lisinopril    Allergic rhinitis 11/10/2014    Allergic rhinitis, cause unspecified     Arthritis     Asthma 11/10/2014    Backache, unspecified     CAD (coronary artery disease) 2/4/2016    Cancer (HCC)     breast    Chronic renal disease, stage III (HCC)

## 2024-02-15 NOTE — RESULT ENCOUNTER NOTE
Results to patient please.  Potassium is slightly elevated.  Please make sure that she is not on a potassium supplement and have her decrease potassium in diet.  Hemoglobin is still slightly decreased.   Thanks

## 2024-03-11 ENCOUNTER — OFFICE VISIT (OUTPATIENT)
Dept: SURGERY | Age: 84
End: 2024-03-11
Payer: MEDICARE

## 2024-03-11 VITALS
DIASTOLIC BLOOD PRESSURE: 81 MMHG | WEIGHT: 175 LBS | SYSTOLIC BLOOD PRESSURE: 153 MMHG | HEART RATE: 65 BPM | BODY MASS INDEX: 27.47 KG/M2 | HEIGHT: 67 IN | OXYGEN SATURATION: 85 %

## 2024-03-11 DIAGNOSIS — R10.31 RIGHT GROIN PAIN: ICD-10-CM

## 2024-03-11 DIAGNOSIS — R10.31 RLQ ABDOMINAL PAIN: Primary | ICD-10-CM

## 2024-03-11 PROCEDURE — 99214 OFFICE O/P EST MOD 30 MIN: CPT | Performed by: SURGERY

## 2024-03-11 PROCEDURE — G8399 PT W/DXA RESULTS DOCUMENT: HCPCS | Performed by: SURGERY

## 2024-03-11 PROCEDURE — 1123F ACP DISCUSS/DSCN MKR DOCD: CPT | Performed by: SURGERY

## 2024-03-11 PROCEDURE — G8417 CALC BMI ABV UP PARAM F/U: HCPCS | Performed by: SURGERY

## 2024-03-11 PROCEDURE — 1036F TOBACCO NON-USER: CPT | Performed by: SURGERY

## 2024-03-11 PROCEDURE — 1090F PRES/ABSN URINE INCON ASSESS: CPT | Performed by: SURGERY

## 2024-03-11 PROCEDURE — G8427 DOCREV CUR MEDS BY ELIG CLIN: HCPCS | Performed by: SURGERY

## 2024-03-11 PROCEDURE — G8484 FLU IMMUNIZE NO ADMIN: HCPCS | Performed by: SURGERY

## 2024-03-11 NOTE — PROGRESS NOTES
document contributes to the combination of 2 or combination of 3 in Category 1 below.  For this visit I also reviewed old records and prior notes.      No results for input(s): \"WBC\", \"HGB\", \"PLT\", \"NA\", \"K\", \"CL\", \"CO2\", \"BUN\", \"CREA\", \"GLU\", \"INR\", \"APTT\", \"ALT\", \"AML\", \"LCAD\", \"PH\", \"PCO2\", \"PO2\", \"HCO3\" in the last 72 hours.    Invalid input(s): \"PTP\", \"TBIL\", \"TBILI\", \"CBIL\", \"SGOT\", \"GPT\", \"AP\", \"LPSE\", \"NH4\", \"TROPT\", \"TROIQ\"  Review of most recent CBC  Lab Results   Component Value Date    WBC 7.4 02/14/2024    HGB 11.6 (L) 02/14/2024    HCT 36.7 02/14/2024    .8 02/14/2024     02/14/2024       Review of most recent BMP  Lab Results   Component Value Date/Time     01/30/2024 12:02 PM    K 5.3 02/14/2024 04:28 PM     01/30/2024 12:02 PM    CO2 26 01/30/2024 12:02 PM    BUN 20 01/30/2024 12:02 PM    CREATININE 1.30 01/30/2024 12:02 PM    GLUCOSE 181 01/30/2024 12:02 PM    CALCIUM 9.5 01/30/2024 12:02 PM    LABGLOM 41 01/30/2024 12:02 PM    LABGLOM 54 04/26/2022 08:57 AM        Review of most recent LFTs (and lipase if done)  Lab Results   Component Value Date    ALT 20 01/30/2024    AST 19 01/30/2024    ALKPHOS 90 01/30/2024    BILITOT 0.3 01/30/2024     Lab Results   Component Value Date    LIPASE 179 01/10/2022       No results found for: \"INR\", \"APTT\", \"LCAD\"    Review of most recent HgbA1c  Hemoglobin A1C   Date Value Ref Range Status   01/30/2024 6.2 (H) 4.8 - 5.6 % Final       Nutritional assessment screen for wound healing issues:  Lab Results   Component Value Date    LABALBU 3.9 01/30/2024       @lastcovr@    Xray Result (most recent):  XR KNEE RIGHT (1-2 VIEWS) 02/23/2023    Narrative  PA LATERAL CHEST  2/23/2023 9:38 AM    HISTORY:  Cough, unspecified  ;    COMPARISON: January 10, 2022    FINDINGS:  The heart size is enlarged.  There is no lobar consolidation, pleural  effusions or pulmonary edema.        RIGHT KNEE AP AND LATERAL VIEWS: Osteoarthritis is present in the

## 2024-03-25 ENCOUNTER — HOSPITAL ENCOUNTER (OUTPATIENT)
Dept: CT IMAGING | Age: 84
Discharge: HOME | End: 2024-03-28
Attending: SURGERY
Payer: MEDICARE

## 2024-03-25 DIAGNOSIS — R10.31 RLQ ABDOMINAL PAIN: ICD-10-CM

## 2024-03-25 DIAGNOSIS — R10.31 RIGHT GROIN PAIN: ICD-10-CM

## 2024-03-25 LAB — CREAT BLD-MCNC: 1.12 MG/DL (ref 0.8–1.5)

## 2024-03-25 PROCEDURE — 82565 ASSAY OF CREATININE: CPT

## 2024-03-25 PROCEDURE — 6360000004 HC RX CONTRAST MEDICATION: Performed by: SURGERY

## 2024-03-25 PROCEDURE — 74177 CT ABD & PELVIS W/CONTRAST: CPT

## 2024-03-25 RX ADMIN — IOPAMIDOL 100 ML: 755 INJECTION, SOLUTION INTRAVENOUS at 15:39

## 2024-03-27 ENCOUNTER — HOSPITAL ENCOUNTER (OUTPATIENT)
Dept: GENERAL RADIOLOGY | Age: 84
Discharge: HOME | End: 2024-03-30
Payer: MEDICARE

## 2024-03-27 ENCOUNTER — OFFICE VISIT (OUTPATIENT)
Dept: FAMILY MEDICINE CLINIC | Facility: CLINIC | Age: 84
End: 2024-03-27
Payer: MEDICARE

## 2024-03-27 VITALS
DIASTOLIC BLOOD PRESSURE: 78 MMHG | SYSTOLIC BLOOD PRESSURE: 138 MMHG | HEART RATE: 76 BPM | OXYGEN SATURATION: 98 % | TEMPERATURE: 98.1 F

## 2024-03-27 DIAGNOSIS — R05.9 COUGH, UNSPECIFIED TYPE: ICD-10-CM

## 2024-03-27 DIAGNOSIS — J01.00 ACUTE NON-RECURRENT MAXILLARY SINUSITIS: ICD-10-CM

## 2024-03-27 DIAGNOSIS — J06.9 VIRAL URI: ICD-10-CM

## 2024-03-27 DIAGNOSIS — R05.9 COUGH, UNSPECIFIED TYPE: Primary | ICD-10-CM

## 2024-03-27 LAB
EXP DATE SOLUTION: NORMAL
EXP DATE SWAB: NORMAL
EXPIRATION DATE: NORMAL
INFLUENZA A ANTIGEN, POC: NEGATIVE
INFLUENZA B ANTIGEN, POC: NEGATIVE
LOT NUMBER POC: NORMAL
LOT NUMBER SOLUTION: NORMAL
LOT NUMBER SWAB: NORMAL
SARS-COV-2 RNA, POC: NEGATIVE
VALID INTERNAL CONTROL, POC: YES

## 2024-03-27 PROCEDURE — 1090F PRES/ABSN URINE INCON ASSESS: CPT | Performed by: NURSE PRACTITIONER

## 2024-03-27 PROCEDURE — G8399 PT W/DXA RESULTS DOCUMENT: HCPCS | Performed by: NURSE PRACTITIONER

## 2024-03-27 PROCEDURE — 1036F TOBACCO NON-USER: CPT | Performed by: NURSE PRACTITIONER

## 2024-03-27 PROCEDURE — G8484 FLU IMMUNIZE NO ADMIN: HCPCS | Performed by: NURSE PRACTITIONER

## 2024-03-27 PROCEDURE — 71046 X-RAY EXAM CHEST 2 VIEWS: CPT

## 2024-03-27 PROCEDURE — G8427 DOCREV CUR MEDS BY ELIG CLIN: HCPCS | Performed by: NURSE PRACTITIONER

## 2024-03-27 PROCEDURE — 87804 INFLUENZA ASSAY W/OPTIC: CPT | Performed by: NURSE PRACTITIONER

## 2024-03-27 PROCEDURE — 99213 OFFICE O/P EST LOW 20 MIN: CPT | Performed by: NURSE PRACTITIONER

## 2024-03-27 PROCEDURE — 1123F ACP DISCUSS/DSCN MKR DOCD: CPT | Performed by: NURSE PRACTITIONER

## 2024-03-27 PROCEDURE — G8417 CALC BMI ABV UP PARAM F/U: HCPCS | Performed by: NURSE PRACTITIONER

## 2024-03-27 PROCEDURE — 87635 SARS-COV-2 COVID-19 AMP PRB: CPT | Performed by: NURSE PRACTITIONER

## 2024-03-27 RX ORDER — AMOXICILLIN AND CLAVULANATE POTASSIUM 875; 125 MG/1; MG/1
1 TABLET, FILM COATED ORAL 2 TIMES DAILY
Qty: 14 TABLET | Refills: 0 | Status: SHIPPED | OUTPATIENT
Start: 2024-03-27 | End: 2024-04-03

## 2024-03-27 SDOH — ECONOMIC STABILITY: INCOME INSECURITY: HOW HARD IS IT FOR YOU TO PAY FOR THE VERY BASICS LIKE FOOD, HOUSING, MEDICAL CARE, AND HEATING?: NOT VERY HARD

## 2024-03-27 SDOH — ECONOMIC STABILITY: FOOD INSECURITY: WITHIN THE PAST 12 MONTHS, THE FOOD YOU BOUGHT JUST DIDN'T LAST AND YOU DIDN'T HAVE MONEY TO GET MORE.: NEVER TRUE

## 2024-03-27 SDOH — ECONOMIC STABILITY: FOOD INSECURITY: WITHIN THE PAST 12 MONTHS, YOU WORRIED THAT YOUR FOOD WOULD RUN OUT BEFORE YOU GOT MONEY TO BUY MORE.: NEVER TRUE

## 2024-03-27 ASSESSMENT — ENCOUNTER SYMPTOMS
ALLERGIC/IMMUNOLOGIC NEGATIVE: 1
EYES NEGATIVE: 1
GASTROINTESTINAL NEGATIVE: 1
SINUS PRESSURE: 1
COUGH: 1
SINUS PAIN: 1

## 2024-03-27 NOTE — PROGRESS NOTES
[facility] [address]  [place] [address]   (ph) [phone] (fax) [phone]  MAGNO Fontenot      Chief Complaint   Patient presents with    Cough     c/o cough & chest congestion (coughing up phlegm) x2 weeks       82 yo comes in c/o a cough. Reports that she has had  cough & chest congestion (coughing up phlegm) x2 weeks. Denies loss of taste or smell; however, does not have an appetite. Slight sinus congestion as well.     Cough        Allergies   Allergen Reactions    Atorvastatin Other (See Comments)    Codeine Hives    Gabapentin Hives and Itching    Lisinopril Other (See Comments)     Ace cough    Pravastatin Other (See Comments)    Sulfa Antibiotics Other (See Comments)     Pt takes Celebrex without problems    Sulfur Hives    Esomeprazole Rash     All over rash and itching     Rosuvastatin Rash       Past Medical History:   Diagnosis Date    Abnormal EKG     ACE inhibitor intolerance 2014    cough with lisinopril    Allergic rhinitis 11/10/2014    Allergic rhinitis, cause unspecified     Arthritis     Asthma 11/10/2014    Backache, unspecified     CAD (coronary artery disease) 2/4/2016    Cancer (HCC)     breast    Chronic renal disease, stage III (HCC) [552241] 07/26/2022    Continuous leakage(788.37)     Depressive disorder, not elsewhere classified     Diverticulitis large intestine     Diverticulitis large intestine w/o perforation or abscess w/o bleeding 10/26/2015    Diverticulitis of colon (without mention of hemorrhage)(562.11)     Dyslipidemia     Dyspnea     Shortness of breath    Encounter for long-term (current) use of aspirin     Esophageal dysphagia 10/26/2015    Esophageal reflux     Essential hypertension     External hemorrhoids without mention of complication     Gastritis due to nonsteroidal anti-inflammatory drug (NSAID) 6/26/2015    Generalized anxiety disorder     GERD (gastroesophageal reflux disease)     Heart

## 2024-04-01 ENCOUNTER — OFFICE VISIT (OUTPATIENT)
Dept: SURGERY | Age: 84
End: 2024-04-01
Payer: MEDICARE

## 2024-04-01 VITALS
BODY MASS INDEX: 27 KG/M2 | WEIGHT: 172 LBS | DIASTOLIC BLOOD PRESSURE: 78 MMHG | HEART RATE: 70 BPM | SYSTOLIC BLOOD PRESSURE: 140 MMHG | HEIGHT: 67 IN | OXYGEN SATURATION: 95 %

## 2024-04-01 DIAGNOSIS — K57.32 DIVERTICULITIS LARGE INTESTINE W/O PERFORATION OR ABSCESS W/O BLEEDING: Primary | ICD-10-CM

## 2024-04-01 PROCEDURE — 99214 OFFICE O/P EST MOD 30 MIN: CPT | Performed by: SURGERY

## 2024-04-01 PROCEDURE — 1090F PRES/ABSN URINE INCON ASSESS: CPT | Performed by: SURGERY

## 2024-04-01 PROCEDURE — 1123F ACP DISCUSS/DSCN MKR DOCD: CPT | Performed by: SURGERY

## 2024-04-01 PROCEDURE — G8399 PT W/DXA RESULTS DOCUMENT: HCPCS | Performed by: SURGERY

## 2024-04-01 PROCEDURE — 1036F TOBACCO NON-USER: CPT | Performed by: SURGERY

## 2024-04-01 PROCEDURE — G8427 DOCREV CUR MEDS BY ELIG CLIN: HCPCS | Performed by: SURGERY

## 2024-04-01 PROCEDURE — G8417 CALC BMI ABV UP PARAM F/U: HCPCS | Performed by: SURGERY

## 2024-04-01 NOTE — PROGRESS NOTES
H&P/Consult Note/Progress Note/Office Note:   Ann Oneal  MRN: [id number]  :1940  Age:83 y.o.    HPI: Ann Oneal is a 83 y.o. female who was referred for evaluation of right groin pain which began in late 2023.    She previously saw Dr. Yap for suspected bilateral inguinal hernias and had a robotic exam  at which time she had bilateral cord lipomas removed but no hernias present.  She had lysis of adhesions.    I saw her for the first time on 3/11/24.  By the time of her visit she reported her inguinal pain had resolved.  No obvious palpable inguinal hernia on examination        23 CT abd/pelvis without contrast  Hx: RLQ abd pain x4days      COMPARISON: CT abdomen and pelvis dated 2019     FINDINGS:  Evaluation of the hollow and solid viscera is limited by the lack of intravenous contrast.     CT ABDOMEN: There is no hydronephrosis or hydroureter. No renal or ureteral stones evident. The appendix is normal.   Cholecystectomy clips are present. The adrenal glands are normal. The stomach is unremarkable.   Diverticulosis is noted along the sigmoid and descending colon. No focal inflammation or obstruction.     CT PELVIS: No distal ureteral or UVJ stones. The bladder is incompletely distended.   The rectum is normal. No free fluid accumulating dependently in the pelvis.   Bilateral right greater than left fat-containing inguinal hernias are present.     No aggressive bone lesions identified.     IMPRESSION:  1. No acute abdominal or pelvic abnormality noted on this limited examination  without the benefit of intravenous contrast. No renal or ureteral stones noted.  The appendix is normal.  2. Mild diverticulosis without diverticulitis.  3. Bilateral right greater than left fat-containing inguinal hernias.         23 s/p robotic groin exam (no hernias seen); Dr Yap  Post-operative Diagnosis: bilateral cord lipomas, no evidence of inguinal hernias, significant

## 2024-04-20 ENCOUNTER — HOSPITAL ENCOUNTER (EMERGENCY)
Age: 84
Discharge: HOME | End: 2024-04-20
Attending: EMERGENCY MEDICINE
Payer: MEDICARE

## 2024-04-20 ENCOUNTER — APPOINTMENT (OUTPATIENT)
Dept: CT IMAGING | Age: 84
End: 2024-04-20
Payer: MEDICARE

## 2024-04-20 ENCOUNTER — APPOINTMENT (OUTPATIENT)
Dept: ULTRASOUND IMAGING | Age: 84
End: 2024-04-20
Payer: MEDICARE

## 2024-04-20 ENCOUNTER — APPOINTMENT (OUTPATIENT)
Dept: GENERAL RADIOLOGY | Age: 84
End: 2024-04-20
Payer: MEDICARE

## 2024-04-20 ENCOUNTER — APPOINTMENT (OUTPATIENT)
Dept: MRI IMAGING | Age: 84
End: 2024-04-20
Payer: MEDICARE

## 2024-04-20 VITALS
HEART RATE: 88 BPM | BODY MASS INDEX: 26.68 KG/M2 | OXYGEN SATURATION: 92 % | RESPIRATION RATE: 21 BRPM | TEMPERATURE: 98.7 F | HEIGHT: 67 IN | SYSTOLIC BLOOD PRESSURE: 130 MMHG | WEIGHT: 170 LBS | DIASTOLIC BLOOD PRESSURE: 62 MMHG

## 2024-04-20 DIAGNOSIS — M62.838 CERVICAL PARASPINAL MUSCLE SPASM: ICD-10-CM

## 2024-04-20 DIAGNOSIS — M54.12 CERVICAL RADICULOPATHY: Primary | ICD-10-CM

## 2024-04-20 DIAGNOSIS — E83.42 HYPOMAGNESEMIA: ICD-10-CM

## 2024-04-20 LAB
ALBUMIN SERPL-MCNC: 3.7 G/DL (ref 3.2–4.6)
ALBUMIN/GLOB SERPL: 1 (ref 0.4–1.6)
ALP SERPL-CCNC: 94 U/L (ref 50–136)
ALT SERPL-CCNC: 19 U/L (ref 12–65)
ANION GAP SERPL CALC-SCNC: 6 MMOL/L (ref 2–11)
APPEARANCE UR: ABNORMAL
AST SERPL-CCNC: 20 U/L (ref 15–37)
BACTERIA URNS QL MICRO: NEGATIVE /HPF
BASOPHILS # BLD: 0.1 K/UL (ref 0–0.2)
BASOPHILS NFR BLD: 1 % (ref 0–2)
BILIRUB SERPL-MCNC: 0.3 MG/DL (ref 0.2–1.1)
BILIRUB UR QL: NEGATIVE
BUN SERPL-MCNC: 18 MG/DL (ref 8–23)
CALCIUM SERPL-MCNC: 9 MG/DL (ref 8.3–10.4)
CASTS URNS QL MICRO: ABNORMAL /LPF
CHLORIDE SERPL-SCNC: 104 MMOL/L (ref 103–113)
CO2 SERPL-SCNC: 24 MMOL/L (ref 21–32)
COLOR UR: ABNORMAL
CREAT SERPL-MCNC: 1.2 MG/DL (ref 0.6–1)
DIFFERENTIAL METHOD BLD: NORMAL
EOSINOPHIL # BLD: 0.2 K/UL (ref 0–0.8)
EOSINOPHIL NFR BLD: 2 % (ref 0.5–7.8)
EPI CELLS #/AREA URNS HPF: ABNORMAL /HPF
ERYTHROCYTE [DISTWIDTH] IN BLOOD BY AUTOMATED COUNT: 12.6 % (ref 11.9–14.6)
GLOBULIN SER CALC-MCNC: 3.8 G/DL (ref 2.8–4.5)
GLUCOSE SERPL-MCNC: 175 MG/DL (ref 65–100)
GLUCOSE UR STRIP.AUTO-MCNC: NEGATIVE MG/DL
HCT VFR BLD AUTO: 39.6 % (ref 35.8–46.3)
HGB BLD-MCNC: 12.7 G/DL (ref 11.7–15.4)
HGB UR QL STRIP: NEGATIVE
IMM GRANULOCYTES # BLD AUTO: 0 K/UL (ref 0–0.5)
IMM GRANULOCYTES NFR BLD AUTO: 0 % (ref 0–5)
KETONES UR QL STRIP.AUTO: NEGATIVE MG/DL
LEUKOCYTE ESTERASE UR QL STRIP.AUTO: ABNORMAL
LYMPHOCYTES # BLD: 1.6 K/UL (ref 0.5–4.6)
LYMPHOCYTES NFR BLD: 16 % (ref 13–44)
MAGNESIUM SERPL-MCNC: 1.6 MG/DL (ref 1.8–2.4)
MCH RBC QN AUTO: 31.4 PG (ref 26.1–32.9)
MCHC RBC AUTO-ENTMCNC: 32.1 G/DL (ref 31.4–35)
MCV RBC AUTO: 97.8 FL (ref 82–102)
MONOCYTES # BLD: 0.6 K/UL (ref 0.1–1.3)
MONOCYTES NFR BLD: 6 % (ref 4–12)
NEUTS SEG # BLD: 7.7 K/UL (ref 1.7–8.2)
NEUTS SEG NFR BLD: 75 % (ref 43–78)
NITRITE UR QL STRIP.AUTO: NEGATIVE
NRBC # BLD: 0 K/UL (ref 0–0.2)
PH UR STRIP: 6 (ref 5–9)
PLATELET # BLD AUTO: 191 K/UL (ref 150–450)
PMV BLD AUTO: 11.9 FL (ref 9.4–12.3)
POTASSIUM SERPL-SCNC: 4 MMOL/L (ref 3.5–5.1)
PROT SERPL-MCNC: 7.5 G/DL (ref 6.3–8.2)
PROT UR STRIP-MCNC: NEGATIVE MG/DL
RBC # BLD AUTO: 4.05 M/UL (ref 4.05–5.2)
RBC #/AREA URNS HPF: ABNORMAL /HPF
SODIUM SERPL-SCNC: 134 MMOL/L (ref 136–146)
SP GR UR REFRACTOMETRY: 1.02 (ref 1–1.02)
TROPONIN I SERPL HS-MCNC: 8.1 PG/ML (ref 0–14)
TROPONIN I SERPL HS-MCNC: 8.5 PG/ML (ref 0–14)
UROBILINOGEN UR QL STRIP.AUTO: 0.2 EU/DL (ref 0.2–1)
WBC # BLD AUTO: 10.3 K/UL (ref 4.3–11.1)
WBC URNS QL MICRO: ABNORMAL /HPF

## 2024-04-20 PROCEDURE — 6360000002 HC RX W HCPCS: Performed by: PHYSICIAN ASSISTANT

## 2024-04-20 PROCEDURE — 80053 COMPREHEN METABOLIC PANEL: CPT

## 2024-04-20 PROCEDURE — 84484 ASSAY OF TROPONIN QUANT: CPT

## 2024-04-20 PROCEDURE — 71046 X-RAY EXAM CHEST 2 VIEWS: CPT

## 2024-04-20 PROCEDURE — 93005 ELECTROCARDIOGRAM TRACING: CPT | Performed by: PHYSICIAN ASSISTANT

## 2024-04-20 PROCEDURE — 6360000004 HC RX CONTRAST MEDICATION: Performed by: PHYSICIAN ASSISTANT

## 2024-04-20 PROCEDURE — 99285 EMERGENCY DEPT VISIT HI MDM: CPT

## 2024-04-20 PROCEDURE — 96375 TX/PRO/DX INJ NEW DRUG ADDON: CPT

## 2024-04-20 PROCEDURE — 85025 COMPLETE CBC W/AUTO DIFF WBC: CPT

## 2024-04-20 PROCEDURE — 6370000000 HC RX 637 (ALT 250 FOR IP): Performed by: PHYSICIAN ASSISTANT

## 2024-04-20 PROCEDURE — 87086 URINE CULTURE/COLONY COUNT: CPT

## 2024-04-20 PROCEDURE — 2500000003 HC RX 250 WO HCPCS: Performed by: PHYSICIAN ASSISTANT

## 2024-04-20 PROCEDURE — 83735 ASSAY OF MAGNESIUM: CPT

## 2024-04-20 PROCEDURE — A9579 GAD-BASE MR CONTRAST NOS,1ML: HCPCS | Performed by: PHYSICIAN ASSISTANT

## 2024-04-20 PROCEDURE — 93971 EXTREMITY STUDY: CPT

## 2024-04-20 PROCEDURE — 81001 URINALYSIS AUTO W/SCOPE: CPT

## 2024-04-20 PROCEDURE — 73060 X-RAY EXAM OF HUMERUS: CPT

## 2024-04-20 PROCEDURE — 72156 MRI NECK SPINE W/O & W/DYE: CPT

## 2024-04-20 PROCEDURE — 96365 THER/PROPH/DIAG IV INF INIT: CPT

## 2024-04-20 PROCEDURE — 72125 CT NECK SPINE W/O DYE: CPT

## 2024-04-20 RX ORDER — KETOROLAC TROMETHAMINE 15 MG/ML
15 INJECTION, SOLUTION INTRAMUSCULAR; INTRAVENOUS
Status: COMPLETED | OUTPATIENT
Start: 2024-04-20 | End: 2024-04-20

## 2024-04-20 RX ORDER — HYDROMORPHONE HYDROCHLORIDE 1 MG/ML
0.25 INJECTION, SOLUTION INTRAMUSCULAR; INTRAVENOUS; SUBCUTANEOUS ONCE
Status: COMPLETED | OUTPATIENT
Start: 2024-04-20 | End: 2024-04-20

## 2024-04-20 RX ORDER — MAGNESIUM SULFATE 1 G/100ML
1000 INJECTION INTRAVENOUS ONCE
Status: COMPLETED | OUTPATIENT
Start: 2024-04-20 | End: 2024-04-20

## 2024-04-20 RX ORDER — TRAMADOL HYDROCHLORIDE 50 MG/1
50 TABLET ORAL EVERY 6 HOURS PRN
Qty: 12 TABLET | Refills: 0 | Status: SHIPPED | OUTPATIENT
Start: 2024-04-20 | End: 2024-04-23

## 2024-04-20 RX ORDER — METAXALONE 800 MG/1
800 TABLET ORAL 3 TIMES DAILY PRN
Qty: 30 TABLET | Refills: 0 | Status: SHIPPED | OUTPATIENT
Start: 2024-04-20 | End: 2024-04-30

## 2024-04-20 RX ORDER — DIAZEPAM 2 MG/1
5 TABLET ORAL ONCE
Status: COMPLETED | OUTPATIENT
Start: 2024-04-20 | End: 2024-04-20

## 2024-04-20 RX ORDER — LIDOCAINE 4 G/G
1 PATCH TOPICAL
Status: DISCONTINUED | OUTPATIENT
Start: 2024-04-20 | End: 2024-04-20 | Stop reason: HOSPADM

## 2024-04-20 RX ORDER — ONDANSETRON 2 MG/ML
4 INJECTION INTRAMUSCULAR; INTRAVENOUS
Status: COMPLETED | OUTPATIENT
Start: 2024-04-20 | End: 2024-04-20

## 2024-04-20 RX ORDER — DEXAMETHASONE SODIUM PHOSPHATE 10 MG/ML
10 INJECTION INTRAMUSCULAR; INTRAVENOUS ONCE
Status: COMPLETED | OUTPATIENT
Start: 2024-04-20 | End: 2024-04-20

## 2024-04-20 RX ORDER — LIDOCAINE 50 MG/G
1 PATCH TOPICAL DAILY
Qty: 30 PATCH | Refills: 0 | Status: SHIPPED | OUTPATIENT
Start: 2024-04-20 | End: 2024-05-20

## 2024-04-20 RX ORDER — SODIUM CHLORIDE 0.9 % (FLUSH) 0.9 %
10 SYRINGE (ML) INJECTION AS NEEDED
Status: DISCONTINUED | OUTPATIENT
Start: 2024-04-20 | End: 2024-04-20 | Stop reason: HOSPADM

## 2024-04-20 RX ORDER — MAGNESIUM OXIDE 400 MG/1
400 TABLET ORAL DAILY
Qty: 30 TABLET | Refills: 0 | Status: SHIPPED | OUTPATIENT
Start: 2024-04-20

## 2024-04-20 RX ADMIN — HYDROMORPHONE HYDROCHLORIDE 0.25 MG: 1 INJECTION, SOLUTION INTRAMUSCULAR; INTRAVENOUS; SUBCUTANEOUS at 16:06

## 2024-04-20 RX ADMIN — MAGNESIUM SULFATE 1000 MG: 1 INJECTION INTRAVENOUS at 15:36

## 2024-04-20 RX ADMIN — DIAZEPAM 5 MG: 2 TABLET ORAL at 19:00

## 2024-04-20 RX ADMIN — KETOROLAC TROMETHAMINE 15 MG: 15 INJECTION, SOLUTION INTRAMUSCULAR; INTRAVENOUS at 15:00

## 2024-04-20 RX ADMIN — GADOTERIDOL 16 ML: 279.3 INJECTION, SOLUTION INTRAVENOUS at 16:45

## 2024-04-20 RX ADMIN — DEXAMETHASONE SODIUM PHOSPHATE 10 MG: 10 INJECTION INTRAMUSCULAR; INTRAVENOUS at 15:00

## 2024-04-20 RX ADMIN — ONDANSETRON 4 MG: 2 INJECTION INTRAMUSCULAR; INTRAVENOUS at 16:06

## 2024-04-20 ASSESSMENT — PAIN DESCRIPTION - DESCRIPTORS
DESCRIPTORS: ACHING
DESCRIPTORS: ACHING

## 2024-04-20 ASSESSMENT — PAIN SCALES - GENERAL
PAINLEVEL_OUTOF10: 10
PAINLEVEL_OUTOF10: 9
PAINLEVEL_OUTOF10: 5

## 2024-04-20 ASSESSMENT — LIFESTYLE VARIABLES
HOW MANY STANDARD DRINKS CONTAINING ALCOHOL DO YOU HAVE ON A TYPICAL DAY: PATIENT DOES NOT DRINK
HOW OFTEN DO YOU HAVE A DRINK CONTAINING ALCOHOL: NEVER

## 2024-04-20 ASSESSMENT — PAIN DESCRIPTION - LOCATION
LOCATION: ARM
LOCATION: ARM

## 2024-04-20 ASSESSMENT — PAIN DESCRIPTION - ORIENTATION
ORIENTATION: RIGHT
ORIENTATION: RIGHT

## 2024-04-20 NOTE — ED NOTES
Patient mobility status  with difficulty, uses a cane . Provider aware     I have reviewed discharge instructions with the patient.  The patient verbalized understanding.    Patient left ED via Discharge Method: wheelchair to Home with Child.    Opportunity for questions and clarification provided.     Patient given 4 scripts.           Isaiah Tijerina RN  04/20/24 1958

## 2024-04-20 NOTE — ED TRIAGE NOTES
Pt c/o rt arm pain/shoulder pain that started this morning. Pt denies injury to arm. Denies CP or SOB. Hx cardiac stent placement.

## 2024-04-20 NOTE — ED PROVIDER NOTES
Emergency Department Provider Note       PCP: Mary Bunn, APRN - [facility]   Age: 83 y.o.   Sex: female     DISPOSITION Discharge - Pending Orders Complete 04/20/2024 07:14:16 PM       ICD-10-CM    1. Cervical radiculopathy  M54.12 [facility]     traMADol (ULTRAM) 50 MG tablet      2. Hypomagnesemia  E83.42       3. Cervical paraspinal muscle spasm  M62.838 traMADol (ULTRAM) 50 MG tablet          Medical Decision Making     3:55 PM Patient has had minimal relief from Decadron and Toradol. Will add an emergent MRI due to pain and weakness to the arm. Will add IV dilaudid. No weakness to lower extremity, trouble speaking or other neurological defect.   6:20 PM Spoke with Dr. Nevarez regarding patient. We discussed the history, physical exam and workup.  He will go and see the patient. Patient has stage 3 renal disease and is a non insulin dependent diabetic. She has had a cholecystectomy. XR humerus added.  6:39 PM Dr. Nevarez went to see patient, he feels it is musculoskeletal.  X-ray of the right humerus is reassuring.  Patient has excellent pulses distally and is distally neurovascularly intact.  Pain medicine has helped some.  There is no urgent or emergent signs or symptoms at this time to indicate an emergent need for further evaluation here in the ED.  Will treat for cervical spasm, radiculopathy and possible right subacromial bursitis.  She was instructed to call the orthopedic physician for follow-up.  Use warm moist heat, massage and stretching to the neck and ice to the shoulder multiple times a day.  Sling provided for comfort for a few days.  She was told she did need to do gentle range of motion with the shoulder as well so we do not set up a frozen shoulder.  Her daughter is with her and driving her home.  They will return if worsening in any way.  Patient with slight hypomagnesia, the magnesium was replaced here in the emergency department and she will be sent home with  Coordination is intact.      Gait: Gait is intact.   Psychiatric:         Mood and Affect: Mood normal.         Behavior: Behavior normal.         Thought Content: Thought content normal.         Judgment: Judgment normal.        Procedures     Procedures    Orders Placed This Encounter   Procedures    Culture, Urine    XR CHEST (2 VW)    CT CERVICAL SPINE WO CONTRAST    MRI CERVICAL SPINE W WO CONTRAST    XR HUMERUS RIGHT (MIN 2 VIEWS)    CBC with Auto Differential    Troponin    Magnesium    Comprehensive Metabolic Panel    Urinalysis w rflx microscopic    [facility]    Cardiac Monitor - ED Only    Pulse oximetry, continuous    EKG 12 Lead    Saline lock IV    [facility] Arm Sling, Right; L    Vascular duplex upper extremity venous right        Medications given during this emergency department visit:  Medications   lidocaine 4 % external patch 1 patch (1 patch TransDERmal Patch Applied 4/20/24 1459)   sodium chloride flush 0.9 % injection 10 mL (has no administration in time range)   dexAMETHasone (DECADRON) injection 10 mg (10 mg IntraVENous Given 4/20/24 1500)   ketorolac (TORADOL) injection 15 mg (15 mg IntraVENous Given 4/20/24 1500)   magnesium sulfate 1000 mg in dextrose 5% 100 mL IVPB (0 mg IntraVENous Stopped 4/20/24 1743)   HYDROmorphone HCl PF (DILAUDID) injection 0.25 mg (0.25 mg IntraVENous Given 4/20/24 1606)   ondansetron (ZOFRAN) injection 4 mg (4 mg IntraVENous Given 4/20/24 1606)   gadoteridol (PROHANCE) injection 16 mL (16 mLs IntraVENous Given 4/20/24 1645)   diazePAM (VALIUM) tablet 5 mg (5 mg Oral Given 4/20/24 1900)       New Prescriptions    LIDOCAINE (LIDODERM) 5 %    Place 1 patch onto the skin daily 12 hours on, 12 hours off.    MAGNESIUM OXIDE (MAG-OX) 400 MG TABLET    Take 1 tablet by mouth daily    METAXALONE (SKELAXIN) 800 MG TABLET    Take 1 tablet by mouth 3 times daily as needed for Pain (Muscle spasm)    TRAMADOL (ULTRAM) 50 MG       Vascular duplex upper extremity venous right   Final Result   No evidence of venous thrombus in the right upper extremity                      No results for input(s): \"COVID19\" in the last 72 hours.    Voice dictation software was used during the making of this note.  This software is not perfect and grammatical and other typographical errors may be present.  This note has not been completely proofread for errors.     Catina Gates PA  04/20/24 1918

## 2024-04-20 NOTE — ED PROVIDER NOTES
Saw patient at request of Ms. Demar.  Patient woke up with right upper extremity pain this would radiate into the right trapezius region of the right aspect of the chest as well.  No recent trauma.  She is right-handed.  No fever chills or rash.  Remote history of shingles.  Not really short of breath and pain is not pleuritic.  No focal numbness or weakness.  All does feel like she cannot  very well on the right due to pain that radiates up in the shoulder.    On exam, alert normal speech.  No facial asymmetry.  Good right 2+ radial pulse.  Shoulder discomfort with flexion extension of the wrist or right elbow.  Tenderness to palpation across the upper humerus and the shoulder region and trapezius region as well.  This is aggravated by any sort of rotation of the shoulder or abduction.  No discoloration.  No warmth or rash.    I reviewed all the ancillary studies that have been ordered.  No evidence for DVT or arterial occlusion.  No evidence for any cervical myelopathy, radiculitis or nerve impingement.  X-ray of the right shoulder is pending.  I tend to lean toward a musculoskeletal diagnosis of bursitis or bicipital tendinitis.         Black Nevarez MD  04/20/24 4606

## 2024-04-20 NOTE — DISCHARGE INSTRUCTIONS
Use warm, moist heat to right neck area area, massage, gentle range of motion and stretching to area.  Use ice to the right shoulder.  Take the magnesium daily and follow-up with your PCP for recheck.  Use the medication as directed, ED if worse. I will refer to Ortho for follow up.  Please call and make an appointment.

## 2024-04-21 LAB
BACTERIA SPEC CULT: NORMAL
EKG ATRIAL RATE: 69 BPM
EKG DIAGNOSIS: NORMAL
EKG P AXIS: 55 DEGREES
EKG P-R INTERVAL: 182 MS
EKG Q-T INTERVAL: 381 MS
EKG QRS DURATION: 95 MS
EKG QTC CALCULATION (BAZETT): 414 MS
EKG R AXIS: 53 DEGREES
EKG T AXIS: 77 DEGREES
EKG VENTRICULAR RATE: 71 BPM
SERVICE CMNT-IMP: NORMAL

## 2024-04-21 PROCEDURE — 93010 ELECTROCARDIOGRAM REPORT: CPT | Performed by: INTERNAL MEDICINE

## 2024-04-22 ENCOUNTER — CARE COORDINATION (OUTPATIENT)
Dept: CARE COORDINATION | Facility: CLINIC | Age: 84
End: 2024-04-22

## 2024-04-22 NOTE — CARE COORDINATION
Ambulatory Care Management Outreach Attempt    This patient was received as a referral from  Daily assignment for case management of ed utilizer.    Attempted to reach patient for ED follow up. Unable to reach patient, LM with my contact information and will reach out again tomorrow.        Patient: Ann Oneal Patient : 1940 MRN: [id number]    Last Discharge Facility       Date Complaint Diagnosis Description Type Department Provider    24 Arm Pain Cervical radiculopathy ... ED (DISCHARGE) SFBlack Pierre MD                Noted following upcoming appointments from discharge chart review:   [facility] follow up appointment(s):   Future Appointments   Date Time Provider Department Center   2024  3:30 PM Mary Bunn APRN - CNP [facility] AMB   2024  9:45 AM Vince Roper DO [facility] AMB   10/29/2024  8:00 AM Mary Bunn APRN - [facility] AMB     Non-[facility] follow up appointment(s):

## 2024-04-23 ENCOUNTER — CARE COORDINATION (OUTPATIENT)
Dept: CARE COORDINATION | Facility: CLINIC | Age: 84
End: 2024-04-23

## 2024-04-23 LAB
BACTERIA SPEC CULT: NORMAL
SERVICE CMNT-IMP: NORMAL

## 2024-04-23 NOTE — CARE COORDINATION
2nd attempt to reach pt to offer ACM services. Unable to connect with pt, will attempt again in 5 working days.

## 2024-04-30 ENCOUNTER — CARE COORDINATION (OUTPATIENT)
Dept: CARE COORDINATION | Facility: CLINIC | Age: 84
End: 2024-04-30

## 2024-04-30 NOTE — CARE COORDINATION
3rd attempt to reach patient to offer ACM services. Pt has respectfully declined services at this time, my contact information has been shared with pt in the event there circumstances change. They are free to reach out at any time. ACM signing off.

## 2024-05-02 ENCOUNTER — OFFICE VISIT (OUTPATIENT)
Dept: ORTHOPEDIC SURGERY | Age: 84
End: 2024-05-02
Payer: MEDICARE

## 2024-05-02 VITALS — WEIGHT: 170 LBS | HEIGHT: 67 IN | BODY MASS INDEX: 26.68 KG/M2

## 2024-05-02 DIAGNOSIS — M47.812 CERVICAL SPONDYLOSIS: Primary | ICD-10-CM

## 2024-05-02 DIAGNOSIS — M50.30 DDD (DEGENERATIVE DISC DISEASE), CERVICAL: ICD-10-CM

## 2024-05-02 DIAGNOSIS — M25.511 ACUTE PAIN OF RIGHT SHOULDER: ICD-10-CM

## 2024-05-02 PROCEDURE — 1090F PRES/ABSN URINE INCON ASSESS: CPT | Performed by: PHYSICIAN ASSISTANT

## 2024-05-02 PROCEDURE — 99204 OFFICE O/P NEW MOD 45 MIN: CPT | Performed by: PHYSICIAN ASSISTANT

## 2024-05-02 PROCEDURE — G8417 CALC BMI ABV UP PARAM F/U: HCPCS | Performed by: PHYSICIAN ASSISTANT

## 2024-05-02 PROCEDURE — 1036F TOBACCO NON-USER: CPT | Performed by: PHYSICIAN ASSISTANT

## 2024-05-02 PROCEDURE — 1123F ACP DISCUSS/DSCN MKR DOCD: CPT | Performed by: PHYSICIAN ASSISTANT

## 2024-05-02 PROCEDURE — G8399 PT W/DXA RESULTS DOCUMENT: HCPCS | Performed by: PHYSICIAN ASSISTANT

## 2024-05-02 PROCEDURE — G8427 DOCREV CUR MEDS BY ELIG CLIN: HCPCS | Performed by: PHYSICIAN ASSISTANT

## 2024-05-02 RX ORDER — TIZANIDINE 2 MG/1
2 TABLET ORAL NIGHTLY PRN
Qty: 30 TABLET | Refills: 2 | Status: SHIPPED | OUTPATIENT
Start: 2024-05-02

## 2024-05-02 NOTE — PROGRESS NOTES
Name: Ann Oneal  YOB: 1940  Gender: female  MRN: [id number]    CC:   Chief Complaint   Patient presents with    New Patient     Neck pain          HPI:   Ann Oneal is a 83 y.o. female with a PMHx of diabetes, CKD, other comorbidities below.         They present here for evaluation of right-sided neck pain and shoulder pain.  This been going on a few weeks.  She was seen in the emergency room recently underwent CT scan of the spine as well as MRI of the cervical spine with and without contrast.  Results noted below on the recent MRI.  Patient denies any numbness or tingling in the right upper extremity.  Denies any ataxic gait or balance issues.  She denies any rating pain down the right arm.  Most of her pain is isolated to the right shoulder in the trapezius distribution.  She is having pain and difficulty sleeping.  She is also having pain using that right arm and raising it up overhead with functional movements and performing tasks around the house.  She was seen at the emergency room recently and given tramadol.          Past Medical History Includes:   Past Medical History:   Diagnosis Date    Abnormal EKG     ACE inhibitor intolerance 2014    cough with lisinopril    Allergic rhinitis 11/10/2014    Allergic rhinitis, cause unspecified     Arthritis     Asthma 11/10/2014    Backache, unspecified     CAD (coronary artery disease) 2/4/2016    Cancer (HCC)     breast    Chronic renal disease, stage III (HCC) [910019] 07/26/2022    Continuous leakage(788.37)     Depressive disorder, not elsewhere classified     Diverticulitis large intestine     Diverticulitis large intestine w/o perforation or abscess w/o bleeding 10/26/2015    Diverticulitis of colon (without mention of hemorrhage)(562.11)     Dyslipidemia     Dyspnea     Shortness of breath    Encounter for long-term (current) use of aspirin     Esophageal dysphagia 10/26/2015    Esophageal reflux     Essential hypertension

## 2024-07-03 ENCOUNTER — OFFICE VISIT (OUTPATIENT)
Dept: FAMILY MEDICINE CLINIC | Facility: CLINIC | Age: 84
End: 2024-07-03
Payer: MEDICARE

## 2024-07-03 VITALS
DIASTOLIC BLOOD PRESSURE: 60 MMHG | HEIGHT: 68 IN | OXYGEN SATURATION: 98 % | HEART RATE: 68 BPM | BODY MASS INDEX: 26.67 KG/M2 | WEIGHT: 176 LBS | SYSTOLIC BLOOD PRESSURE: 140 MMHG

## 2024-07-03 DIAGNOSIS — E11.22 TYPE 2 DIABETES MELLITUS WITH CHRONIC KIDNEY DISEASE, WITHOUT LONG-TERM CURRENT USE OF INSULIN, UNSPECIFIED CKD STAGE (HCC): Primary | ICD-10-CM

## 2024-07-03 DIAGNOSIS — I50.32 DIASTOLIC CHF, CHRONIC (HCC): ICD-10-CM

## 2024-07-03 DIAGNOSIS — M17.0 PRIMARY OSTEOARTHRITIS OF BOTH KNEES: ICD-10-CM

## 2024-07-03 DIAGNOSIS — J30.89 ALLERGIC RHINITIS DUE TO OTHER ALLERGIC TRIGGER, UNSPECIFIED SEASONALITY: ICD-10-CM

## 2024-07-03 DIAGNOSIS — J45.20 MILD INTERMITTENT ASTHMA WITHOUT COMPLICATION: ICD-10-CM

## 2024-07-03 PROCEDURE — 99214 OFFICE O/P EST MOD 30 MIN: CPT | Performed by: NURSE PRACTITIONER

## 2024-07-03 PROCEDURE — 1036F TOBACCO NON-USER: CPT | Performed by: NURSE PRACTITIONER

## 2024-07-03 PROCEDURE — 1123F ACP DISCUSS/DSCN MKR DOCD: CPT | Performed by: NURSE PRACTITIONER

## 2024-07-03 PROCEDURE — 3044F HG A1C LEVEL LT 7.0%: CPT | Performed by: NURSE PRACTITIONER

## 2024-07-03 PROCEDURE — G8427 DOCREV CUR MEDS BY ELIG CLIN: HCPCS | Performed by: NURSE PRACTITIONER

## 2024-07-03 PROCEDURE — 1090F PRES/ABSN URINE INCON ASSESS: CPT | Performed by: NURSE PRACTITIONER

## 2024-07-03 PROCEDURE — G8417 CALC BMI ABV UP PARAM F/U: HCPCS | Performed by: NURSE PRACTITIONER

## 2024-07-03 PROCEDURE — G8399 PT W/DXA RESULTS DOCUMENT: HCPCS | Performed by: NURSE PRACTITIONER

## 2024-07-03 RX ORDER — ALBUTEROL SULFATE 90 UG/1
1 AEROSOL, METERED RESPIRATORY (INHALATION) EVERY 6 HOURS PRN
Qty: 18 G | Refills: 3 | Status: SHIPPED | OUTPATIENT
Start: 2024-07-03

## 2024-07-03 ASSESSMENT — PATIENT HEALTH QUESTIONNAIRE - PHQ9
SUM OF ALL RESPONSES TO PHQ QUESTIONS 1-9: 0
5. POOR APPETITE OR OVEREATING: NOT AT ALL
9. THOUGHTS THAT YOU WOULD BE BETTER OFF DEAD, OR OF HURTING YOURSELF: NOT AT ALL
SUM OF ALL RESPONSES TO PHQ QUESTIONS 1-9: 0
1. LITTLE INTEREST OR PLEASURE IN DOING THINGS: NOT AT ALL
SUM OF ALL RESPONSES TO PHQ9 QUESTIONS 1 & 2: 0
2. FEELING DOWN, DEPRESSED OR HOPELESS: NOT AT ALL
1. LITTLE INTEREST OR PLEASURE IN DOING THINGS: NOT AT ALL
SUM OF ALL RESPONSES TO PHQ QUESTIONS 1-9: 0
4. FEELING TIRED OR HAVING LITTLE ENERGY: NOT AT ALL
SUM OF ALL RESPONSES TO PHQ QUESTIONS 1-9: 0
8. MOVING OR SPEAKING SO SLOWLY THAT OTHER PEOPLE COULD HAVE NOTICED. OR THE OPPOSITE, BEING SO FIGETY OR RESTLESS THAT YOU HAVE BEEN MOVING AROUND A LOT MORE THAN USUAL: NOT AT ALL
3. TROUBLE FALLING OR STAYING ASLEEP: NOT AT ALL
SUM OF ALL RESPONSES TO PHQ QUESTIONS 1-9: 0
SUM OF ALL RESPONSES TO PHQ QUESTIONS 1-9: 0
7. TROUBLE CONCENTRATING ON THINGS, SUCH AS READING THE NEWSPAPER OR WATCHING TELEVISION: NOT AT ALL
2. FEELING DOWN, DEPRESSED OR HOPELESS: NOT AT ALL
SUM OF ALL RESPONSES TO PHQ QUESTIONS 1-9: 0
6. FEELING BAD ABOUT YOURSELF - OR THAT YOU ARE A FAILURE OR HAVE LET YOURSELF OR YOUR FAMILY DOWN: NOT AT ALL
SUM OF ALL RESPONSES TO PHQ9 QUESTIONS 1 & 2: 0
SUM OF ALL RESPONSES TO PHQ QUESTIONS 1-9: 0

## 2024-07-03 NOTE — PROGRESS NOTES
the lungs every 6 hours as needed for Shortness of Breath  Dispense: 18 g; Refill: 3    5. Primary osteoarthritis of both knees  Stable; tries not to take anything for them. Tries to keep moving        Greater than 50% counseling and/or coordination of care: the treatment regimen is extensive; detailed review. Will do labs at next visit. Lab was closed today. This note was dictated using dragon voice recognition software.  It has been proofread, but there may still exist voice recognition errors that the author did not detect.      Signed By: Mary Bunn APRN - CNP     July 3, 2024

## 2024-08-12 ENCOUNTER — OFFICE VISIT (OUTPATIENT)
Age: 84
End: 2024-08-12
Payer: MEDICARE

## 2024-08-12 VITALS
HEIGHT: 67 IN | BODY MASS INDEX: 26.84 KG/M2 | WEIGHT: 171 LBS | SYSTOLIC BLOOD PRESSURE: 122 MMHG | DIASTOLIC BLOOD PRESSURE: 70 MMHG | HEART RATE: 102 BPM

## 2024-08-12 DIAGNOSIS — I25.10 CORONARY ARTERY DISEASE INVOLVING NATIVE CORONARY ARTERY OF NATIVE HEART WITHOUT ANGINA PECTORIS: ICD-10-CM

## 2024-08-12 DIAGNOSIS — I48.0 PAROXYSMAL ATRIAL FIBRILLATION (HCC): ICD-10-CM

## 2024-08-12 DIAGNOSIS — O36.8390 IRREGULAR FETAL HEART RATE: ICD-10-CM

## 2024-08-12 DIAGNOSIS — R07.89 CHEST DISCOMFORT: ICD-10-CM

## 2024-08-12 DIAGNOSIS — R06.09 DYSPNEA ON EXERTION: ICD-10-CM

## 2024-08-12 DIAGNOSIS — R06.02 SHORTNESS OF BREATH: ICD-10-CM

## 2024-08-12 DIAGNOSIS — I50.32 DIASTOLIC CHF, CHRONIC (HCC): ICD-10-CM

## 2024-08-12 DIAGNOSIS — I49.9 IRREGULAR HEART RATE: Primary | ICD-10-CM

## 2024-08-12 PROCEDURE — 99214 OFFICE O/P EST MOD 30 MIN: CPT | Performed by: INTERNAL MEDICINE

## 2024-08-12 PROCEDURE — 1090F PRES/ABSN URINE INCON ASSESS: CPT | Performed by: INTERNAL MEDICINE

## 2024-08-12 PROCEDURE — G8417 CALC BMI ABV UP PARAM F/U: HCPCS | Performed by: INTERNAL MEDICINE

## 2024-08-12 PROCEDURE — 93000 ELECTROCARDIOGRAM COMPLETE: CPT | Performed by: INTERNAL MEDICINE

## 2024-08-12 PROCEDURE — G8428 CUR MEDS NOT DOCUMENT: HCPCS | Performed by: INTERNAL MEDICINE

## 2024-08-12 PROCEDURE — 1036F TOBACCO NON-USER: CPT | Performed by: INTERNAL MEDICINE

## 2024-08-12 PROCEDURE — 1123F ACP DISCUSS/DSCN MKR DOCD: CPT | Performed by: INTERNAL MEDICINE

## 2024-08-12 PROCEDURE — G8399 PT W/DXA RESULTS DOCUMENT: HCPCS | Performed by: INTERNAL MEDICINE

## 2024-08-12 NOTE — PROGRESS NOTES
2 [facility], [address] [place] [address]  PHONE: [phone]     24    NAME:  Ann Oneal  : 1940  MRN: [id number]       SUBJECTIVE:   Ann Oneal is a 84 y.o. female seen for a follow up visit regarding the following:     Chief Complaint   Patient presents with    Coronary Artery Disease    Congestive Heart Failure    Follow-up       HPI: Here today for follow up for CAD   PCI to Diag, echo showed normal EF  Carotid US:  3/16 and 2018: mild carotid Dz  [facility] 11/10/17: mild CAD, EF 60%.  [facility] 2022:  mild CAD, EF normal, EDP 36    New AF today, asx today. No palp.       Some LOAIZA, some SOB, some rare Cps, more at rest.     Patient denies recent history of orthopnea, PND, excessive dizziness and/or syncope.     She has GLEN, cannot wear mask.     Did not tolerate lipitor, crestor.       Past Medical History, Past Surgical History, Family history, Social History, and Medications were all reviewed with the patient today and updated as necessary.     Current Outpatient Medications   Medication Sig Dispense Refill    albuterol sulfate HFA (PROVENTIL;VENTOLIN;PROAIR) 108 (90 Base) MCG/ACT inhaler Inhale 1 puff into the lungs every 6 hours as needed for Shortness of Breath 18 g 3    fluticasone (FLONASE) 50 MCG/ACT nasal spray 2 sprays by Each Nostril route daily 16 g 3    metFORMIN (GLUCOPHAGE) 500 MG tablet Take 1 tablet by mouth 2 times daily (with meals) 180 tablet 1    simvastatin (ZOCOR) 20 MG tablet Take 1 tablet by mouth nightly 90 tablet 1    Ascorbic Acid (VITAMIN C PO) Take by mouth daily      Multiple Vitamins-Minerals (MULTIVITAMIN ADULTS 50+) TABS Take 1 tablet by mouth daily      tiZANidine (ZANAFLEX) 2 MG tablet Take 1 tablet by mouth nightly as needed (prn) 30 tablet 2    magnesium oxide (MAG-OX) 400 MG tablet Take 1 tablet by mouth daily 30 tablet 0    losartan (COZAAR) 25 MG tablet Take 1 tablet by mouth daily 90 tablet 1    aspirin 81 MG chewable tablet Take  vancomycin 26.0

## 2024-08-12 NOTE — TELEPHONE ENCOUNTER
Requested Prescriptions     Pending Prescriptions Disp Refills    apixaban (ELIQUIS) 5 MG TABS tablet 180 tablet 3     Sig: Take 1 tablet by mouth 2 times daily    metoprolol tartrate (LOPRESSOR) 25 MG tablet 180 tablet 3     Sig: Take 1 tablet by mouth 2 times daily

## 2024-08-13 ENCOUNTER — TELEPHONE (OUTPATIENT)
Age: 84
End: 2024-08-13

## 2024-08-13 DIAGNOSIS — Z79.899 LONG-TERM USE OF HIGH-RISK MEDICATION: ICD-10-CM

## 2024-08-13 DIAGNOSIS — R07.89 CHEST DISCOMFORT: Primary | ICD-10-CM

## 2024-08-13 LAB
ALBUMIN SERPL-MCNC: 4 G/DL (ref 3.2–4.6)
ALBUMIN/GLOB SERPL: 1.3 (ref 1–1.9)
ALP SERPL-CCNC: 81 U/L (ref 35–104)
ALT SERPL-CCNC: 19 U/L (ref 12–65)
ANION GAP SERPL CALC-SCNC: 9 MMOL/L (ref 9–18)
AST SERPL-CCNC: 21 U/L (ref 15–37)
BASOPHILS # BLD: 0.1 K/UL (ref 0–0.2)
BASOPHILS NFR BLD: 1 % (ref 0–2)
BILIRUB SERPL-MCNC: 0.3 MG/DL (ref 0–1.2)
BUN SERPL-MCNC: 22 MG/DL (ref 8–23)
CALCIUM SERPL-MCNC: 9.7 MG/DL (ref 8.8–10.2)
CHLORIDE SERPL-SCNC: 104 MMOL/L (ref 98–107)
CO2 SERPL-SCNC: 27 MMOL/L (ref 20–28)
CREAT SERPL-MCNC: 1.02 MG/DL (ref 0.6–1.1)
DIFFERENTIAL METHOD BLD: ABNORMAL
EOSINOPHIL # BLD: 0.1 K/UL (ref 0–0.8)
EOSINOPHIL NFR BLD: 2 % (ref 0.5–7.8)
ERYTHROCYTE [DISTWIDTH] IN BLOOD BY AUTOMATED COUNT: 13 % (ref 11.9–14.6)
GLOBULIN SER CALC-MCNC: 3.1 G/DL (ref 2.3–3.5)
GLUCOSE SERPL-MCNC: 94 MG/DL (ref 70–99)
HCT VFR BLD AUTO: 39.8 % (ref 35.8–46.3)
HGB BLD-MCNC: 12.3 G/DL (ref 11.7–15.4)
IMM GRANULOCYTES # BLD AUTO: 0 K/UL (ref 0–0.5)
IMM GRANULOCYTES NFR BLD AUTO: 0 % (ref 0–5)
LYMPHOCYTES # BLD: 2.2 K/UL (ref 0.5–4.6)
LYMPHOCYTES NFR BLD: 35 % (ref 13–44)
MAGNESIUM SERPL-MCNC: 2.2 MG/DL (ref 1.8–2.4)
MCH RBC QN AUTO: 30.8 PG (ref 26.1–32.9)
MCHC RBC AUTO-ENTMCNC: 30.9 G/DL (ref 31.4–35)
MCV RBC AUTO: 99.7 FL (ref 82–102)
MONOCYTES # BLD: 0.4 K/UL (ref 0.1–1.3)
MONOCYTES NFR BLD: 7 % (ref 4–12)
NEUTS SEG # BLD: 3.4 K/UL (ref 1.7–8.2)
NEUTS SEG NFR BLD: 55 % (ref 43–78)
NRBC # BLD: 0 K/UL (ref 0–0.2)
PLATELET # BLD AUTO: 161 K/UL (ref 150–450)
PMV BLD AUTO: 12.7 FL (ref 9.4–12.3)
POTASSIUM SERPL-SCNC: 4.7 MMOL/L (ref 3.5–5.1)
PROT SERPL-MCNC: 7.1 G/DL (ref 6.3–8.2)
RBC # BLD AUTO: 3.99 M/UL (ref 4.05–5.2)
SODIUM SERPL-SCNC: 140 MMOL/L (ref 136–145)
TSH W FREE THYROID IF ABNORMAL: 2.55 UIU/ML (ref 0.27–4.2)
WBC # BLD AUTO: 6.2 K/UL (ref 4.3–11.1)

## 2024-08-13 NOTE — TELEPHONE ENCOUNTER
[facility] lab is calling stating the lab order  has been put in wrong and the pt is there trying to get labs now,    New lab orders entered as they were put in as an outside draw//brendab

## 2024-08-21 ENCOUNTER — OFFICE VISIT (OUTPATIENT)
Dept: FAMILY MEDICINE CLINIC | Facility: CLINIC | Age: 84
End: 2024-08-21
Payer: MEDICARE

## 2024-08-21 VITALS
SYSTOLIC BLOOD PRESSURE: 100 MMHG | BODY MASS INDEX: 26.21 KG/M2 | WEIGHT: 167 LBS | OXYGEN SATURATION: 96 % | HEART RATE: 66 BPM | HEIGHT: 67 IN | DIASTOLIC BLOOD PRESSURE: 64 MMHG

## 2024-08-21 DIAGNOSIS — I48.91 ATRIAL FIBRILLATION, UNSPECIFIED TYPE (HCC): ICD-10-CM

## 2024-08-21 DIAGNOSIS — R41.3 MEMORY LOSS: ICD-10-CM

## 2024-08-21 DIAGNOSIS — I10 PRIMARY HYPERTENSION: ICD-10-CM

## 2024-08-21 DIAGNOSIS — E11.21 TYPE 2 DIABETES WITH NEPHROPATHY (HCC): Primary | ICD-10-CM

## 2024-08-21 PROBLEM — D68.69 SECONDARY HYPERCOAGULABLE STATE (HCC): Status: ACTIVE | Noted: 2024-08-21

## 2024-08-21 PROCEDURE — 3044F HG A1C LEVEL LT 7.0%: CPT | Performed by: NURSE PRACTITIONER

## 2024-08-21 PROCEDURE — 3078F DIAST BP <80 MM HG: CPT | Performed by: NURSE PRACTITIONER

## 2024-08-21 PROCEDURE — 99214 OFFICE O/P EST MOD 30 MIN: CPT | Performed by: NURSE PRACTITIONER

## 2024-08-21 PROCEDURE — 1090F PRES/ABSN URINE INCON ASSESS: CPT | Performed by: NURSE PRACTITIONER

## 2024-08-21 PROCEDURE — 1036F TOBACCO NON-USER: CPT | Performed by: NURSE PRACTITIONER

## 2024-08-21 PROCEDURE — G8399 PT W/DXA RESULTS DOCUMENT: HCPCS | Performed by: NURSE PRACTITIONER

## 2024-08-21 PROCEDURE — G8427 DOCREV CUR MEDS BY ELIG CLIN: HCPCS | Performed by: NURSE PRACTITIONER

## 2024-08-21 PROCEDURE — 3074F SYST BP LT 130 MM HG: CPT | Performed by: NURSE PRACTITIONER

## 2024-08-21 PROCEDURE — G8417 CALC BMI ABV UP PARAM F/U: HCPCS | Performed by: NURSE PRACTITIONER

## 2024-08-21 PROCEDURE — 1123F ACP DISCUSS/DSCN MKR DOCD: CPT | Performed by: NURSE PRACTITIONER

## 2024-08-21 RX ORDER — FLUTICASONE PROPIONATE 50 MCG
2 SPRAY, SUSPENSION (ML) NASAL DAILY
Qty: 16 G | Refills: 3 | Status: SHIPPED | OUTPATIENT
Start: 2024-08-21

## 2024-08-21 RX ORDER — LOSARTAN POTASSIUM 25 MG/1
25 TABLET ORAL DAILY
Qty: 90 TABLET | Refills: 1 | Status: SHIPPED | OUTPATIENT
Start: 2024-08-21

## 2024-08-21 ASSESSMENT — PATIENT HEALTH QUESTIONNAIRE - PHQ9
1. LITTLE INTEREST OR PLEASURE IN DOING THINGS: NEARLY EVERY DAY
4. FEELING TIRED OR HAVING LITTLE ENERGY: NEARLY EVERY DAY
7. TROUBLE CONCENTRATING ON THINGS, SUCH AS READING THE NEWSPAPER OR WATCHING TELEVISION: SEVERAL DAYS
SUM OF ALL RESPONSES TO PHQ9 QUESTIONS 1 & 2: 6
SUM OF ALL RESPONSES TO PHQ QUESTIONS 1-9: 17
6. FEELING BAD ABOUT YOURSELF - OR THAT YOU ARE A FAILURE OR HAVE LET YOURSELF OR YOUR FAMILY DOWN: MORE THAN HALF THE DAYS
SUM OF ALL RESPONSES TO PHQ QUESTIONS 1-9: 16
10. IF YOU CHECKED OFF ANY PROBLEMS, HOW DIFFICULT HAVE THESE PROBLEMS MADE IT FOR YOU TO DO YOUR WORK, TAKE CARE OF THINGS AT HOME, OR GET ALONG WITH OTHER PEOPLE: NOT DIFFICULT AT ALL
8. MOVING OR SPEAKING SO SLOWLY THAT OTHER PEOPLE COULD HAVE NOTICED. OR THE OPPOSITE, BEING SO FIGETY OR RESTLESS THAT YOU HAVE BEEN MOVING AROUND A LOT MORE THAN USUAL: SEVERAL DAYS
9. THOUGHTS THAT YOU WOULD BE BETTER OFF DEAD, OR OF HURTING YOURSELF: SEVERAL DAYS
SUM OF ALL RESPONSES TO PHQ QUESTIONS 1-9: 17
5. POOR APPETITE OR OVEREATING: NEARLY EVERY DAY
3. TROUBLE FALLING OR STAYING ASLEEP: NOT AT ALL
SUM OF ALL RESPONSES TO PHQ QUESTIONS 1-9: 17

## 2024-08-21 ASSESSMENT — COLUMBIA-SUICIDE SEVERITY RATING SCALE - C-SSRS
1. WITHIN THE PAST MONTH, HAVE YOU WISHED YOU WERE DEAD OR WISHED YOU COULD GO TO SLEEP AND NOT WAKE UP?: NO
2. HAVE YOU ACTUALLY HAD ANY THOUGHTS OF KILLING YOURSELF?: NO
6. HAVE YOU EVER DONE ANYTHING, STARTED TO DO ANYTHING, OR PREPARED TO DO ANYTHING TO END YOUR LIFE?: NO

## 2024-08-21 ASSESSMENT — ENCOUNTER SYMPTOMS
RESPIRATORY NEGATIVE: 1
ALLERGIC/IMMUNOLOGIC NEGATIVE: 1
GASTROINTESTINAL NEGATIVE: 1
EYES NEGATIVE: 1

## 2024-08-21 NOTE — PROGRESS NOTES
fibrillation, unspecified type (HCC)  Continue Eliquis and Metoprolol  Keep upcoming cardiology tests    3. Primary hypertension  Stable; DASH diet  - losartan (COZAAR) 25 MG tablet; Take 1 tablet by mouth daily  Dispense: 90 tablet; Refill: 1    4. Memory loss  Mini mental was 28; will discuss more in depth at a later date per pt request. Does not wish to see neurology again.      Greater than 50% counseling and/or coordination of care: the treatment regimen is extensive; detailed review. Keep routine appt. This note was dictated using dragon voice recognition software.  It has been proofread, but there may still exist voice recognition errors that the author did not detect.      Signed By: Mary Bunn, APRN - CNP     August 21, 2024

## 2024-09-23 ENCOUNTER — OFFICE VISIT (OUTPATIENT)
Age: 84
End: 2024-09-23
Payer: MEDICARE

## 2024-09-23 VITALS
BODY MASS INDEX: 26.37 KG/M2 | DIASTOLIC BLOOD PRESSURE: 64 MMHG | WEIGHT: 168 LBS | SYSTOLIC BLOOD PRESSURE: 128 MMHG | HEIGHT: 67 IN | HEART RATE: 64 BPM

## 2024-09-23 DIAGNOSIS — I48.0 PAROXYSMAL ATRIAL FIBRILLATION (HCC): ICD-10-CM

## 2024-09-23 DIAGNOSIS — I50.32 DIASTOLIC CHF, CHRONIC (HCC): ICD-10-CM

## 2024-09-23 DIAGNOSIS — I25.10 CORONARY ARTERY DISEASE INVOLVING NATIVE CORONARY ARTERY OF NATIVE HEART WITHOUT ANGINA PECTORIS: Primary | ICD-10-CM

## 2024-09-23 DIAGNOSIS — N18.31 STAGE 3A CHRONIC KIDNEY DISEASE (HCC): ICD-10-CM

## 2024-09-23 PROCEDURE — 99214 OFFICE O/P EST MOD 30 MIN: CPT | Performed by: INTERNAL MEDICINE

## 2024-09-23 PROCEDURE — G8428 CUR MEDS NOT DOCUMENT: HCPCS | Performed by: INTERNAL MEDICINE

## 2024-09-23 PROCEDURE — 1036F TOBACCO NON-USER: CPT | Performed by: INTERNAL MEDICINE

## 2024-09-23 PROCEDURE — 1090F PRES/ABSN URINE INCON ASSESS: CPT | Performed by: INTERNAL MEDICINE

## 2024-09-23 PROCEDURE — G8417 CALC BMI ABV UP PARAM F/U: HCPCS | Performed by: INTERNAL MEDICINE

## 2024-09-23 PROCEDURE — 1123F ACP DISCUSS/DSCN MKR DOCD: CPT | Performed by: INTERNAL MEDICINE

## 2024-09-23 PROCEDURE — G8399 PT W/DXA RESULTS DOCUMENT: HCPCS | Performed by: INTERNAL MEDICINE

## 2024-11-07 ENCOUNTER — TELEMEDICINE (OUTPATIENT)
Dept: FAMILY MEDICINE CLINIC | Facility: CLINIC | Age: 84
End: 2024-11-07

## 2024-11-07 DIAGNOSIS — I10 PRIMARY HYPERTENSION: ICD-10-CM

## 2024-11-07 DIAGNOSIS — J45.20 MILD INTERMITTENT ASTHMA WITHOUT COMPLICATION: ICD-10-CM

## 2024-11-07 DIAGNOSIS — F01.50 VASCULAR DEMENTIA, UNSPECIFIED DEMENTIA SEVERITY, UNSPECIFIED WHETHER BEHAVIORAL, PSYCHOTIC, OR MOOD DISTURBANCE OR ANXIETY (HCC): ICD-10-CM

## 2024-11-07 DIAGNOSIS — E11.21 TYPE 2 DIABETES WITH NEPHROPATHY (HCC): Primary | ICD-10-CM

## 2024-11-07 DIAGNOSIS — Z00.00 ENCOUNTER FOR SUBSEQUENT ANNUAL WELLNESS VISIT (AWV) IN MEDICARE PATIENT: ICD-10-CM

## 2024-11-07 DIAGNOSIS — E78.2 MIXED HYPERLIPIDEMIA: ICD-10-CM

## 2024-11-07 DIAGNOSIS — R53.83 OTHER FATIGUE: ICD-10-CM

## 2024-11-07 RX ORDER — SIMVASTATIN 20 MG
20 TABLET ORAL NIGHTLY
Qty: 90 TABLET | Refills: 1 | Status: SHIPPED | OUTPATIENT
Start: 2024-11-07

## 2024-11-07 RX ORDER — LOSARTAN POTASSIUM 25 MG/1
25 TABLET ORAL DAILY
Qty: 90 TABLET | Refills: 1 | Status: SHIPPED | OUTPATIENT
Start: 2024-11-07

## 2024-11-07 RX ORDER — ALBUTEROL SULFATE 90 UG/1
1 INHALANT RESPIRATORY (INHALATION) EVERY 6 HOURS PRN
Qty: 18 G | Refills: 3 | Status: SHIPPED | OUTPATIENT
Start: 2024-11-07

## 2024-11-07 RX ORDER — FLUTICASONE PROPIONATE 50 MCG
2 SPRAY, SUSPENSION (ML) NASAL DAILY
Qty: 16 G | Refills: 3 | Status: SHIPPED | OUTPATIENT
Start: 2024-11-07

## 2024-11-07 RX ORDER — DONEPEZIL HYDROCHLORIDE 5 MG/1
5 TABLET, FILM COATED ORAL NIGHTLY
Qty: 30 TABLET | Refills: 2 | Status: SHIPPED | OUTPATIENT
Start: 2024-11-07

## 2024-11-07 ASSESSMENT — PATIENT HEALTH QUESTIONNAIRE - PHQ9
SUM OF ALL RESPONSES TO PHQ QUESTIONS 1-9: 1
1. LITTLE INTEREST OR PLEASURE IN DOING THINGS: NOT AT ALL
2. FEELING DOWN, DEPRESSED OR HOPELESS: NOT AT ALL
6. FEELING BAD ABOUT YOURSELF - OR THAT YOU ARE A FAILURE OR HAVE LET YOURSELF OR YOUR FAMILY DOWN: NOT AT ALL
3. TROUBLE FALLING OR STAYING ASLEEP: NOT AT ALL
SUM OF ALL RESPONSES TO PHQ QUESTIONS 1-9: 1
SUM OF ALL RESPONSES TO PHQ9 QUESTIONS 1 & 2: 0
7. TROUBLE CONCENTRATING ON THINGS, SUCH AS READING THE NEWSPAPER OR WATCHING TELEVISION: NOT AT ALL
9. THOUGHTS THAT YOU WOULD BE BETTER OFF DEAD, OR OF HURTING YOURSELF: NOT AT ALL
4. FEELING TIRED OR HAVING LITTLE ENERGY: SEVERAL DAYS
5. POOR APPETITE OR OVEREATING: NOT AT ALL
8. MOVING OR SPEAKING SO SLOWLY THAT OTHER PEOPLE COULD HAVE NOTICED. OR THE OPPOSITE, BEING SO FIGETY OR RESTLESS THAT YOU HAVE BEEN MOVING AROUND A LOT MORE THAN USUAL: NOT AT ALL
SUM OF ALL RESPONSES TO PHQ QUESTIONS 1-9: 1
SUM OF ALL RESPONSES TO PHQ QUESTIONS 1-9: 1
10. IF YOU CHECKED OFF ANY PROBLEMS, HOW DIFFICULT HAVE THESE PROBLEMS MADE IT FOR YOU TO DO YOUR WORK, TAKE CARE OF THINGS AT HOME, OR GET ALONG WITH OTHER PEOPLE: NOT DIFFICULT AT ALL

## 2024-11-07 NOTE — PROGRESS NOTES
COLONOSCOPY  2017    wnl    COLONOSCOPY      HERNIA REPAIR Bilateral 6/23/2023    ROBOTIC DIAGNOSTIC LAPAROSCOPY AND LYSIS OF ADHESIONS performed by Radha Yap DO at [facility] MAIN OR    HYSTERECTOMY (CERVIX STATUS UNKNOWN)      MASTECTOMY Bilateral 1989    Bilateral, breast cancer, no chemotherapy, no radiation    MASTECTOMY, RADICAL Bilateral     ORTHOPEDIC SURGERY  1990s    Fusion of disc x 2    GA UNLISTED PROCEDURE CARDIAC SURGERY  2017    heart cath -- no additional stents    GA UNLISTED PROCEDURE CARDIAC SURGERY  2016    1 stent placed    TOTAL ABDOM HYSTERECTOMY      TOTAL COLECTOMY         Health Maintenance   Topic Date Due    Diabetic Alb to Cr ratio (uACR) test  Never done    Respiratory Syncytial Virus (RSV) Pregnant or age 60 yrs+ (1 - 1-dose 60+ series) Never done    DTaP/Tdap/Td vaccine (1 - Tdap) 11/03/2018    Annual Wellness Visit (Medicare Advantage)  01/01/2024    Flu vaccine (1) 08/01/2024    COVID-19 Vaccine (5 - 2023-24 season) 09/01/2024    Lipids  01/30/2025    GFR test (Diabetes, CKD 3-4, OR last GFR 15-59)  08/13/2025    Depression Monitoring  08/21/2025    DEXA (modify frequency per FRAX score)  Completed    Shingles vaccine  Completed    Pneumococcal 65+ years Vaccine  Completed    Hepatitis A vaccine  Aged Out    Hepatitis B vaccine  Aged Out    Hib vaccine  Aged Out    Polio vaccine  Aged Out    Meningococcal (ACWY) vaccine  Aged Out    Hepatitis C screen  Discontinued         Current Outpatient Medications:     metFORMIN (GLUCOPHAGE) 500 MG tablet, Take 1 tablet by mouth 2 times daily (with meals), Disp: 180 tablet, Rfl: 1    losartan (COZAAR) 25 MG tablet, Take 1 tablet by mouth daily, Disp: 90 tablet, Rfl: 1    fluticasone (FLONASE) 50 MCG/ACT nasal spray, 2 sprays by Each Nostril route daily, Disp: 16 g, Rfl: 3    metoprolol tartrate (LOPRESSOR) 25 MG tablet, Take 1 tablet by mouth 2 times daily, Disp: 180 tablet, Rfl: 3    albuterol sulfate HFA (PROVENTIL;VENTOLIN;PROAIR)

## 2024-11-08 NOTE — PATIENT INSTRUCTIONS

## 2025-02-12 ENCOUNTER — OFFICE VISIT (OUTPATIENT)
Age: 85
End: 2025-02-12
Payer: MEDICARE

## 2025-02-12 VITALS
BODY MASS INDEX: 25.43 KG/M2 | DIASTOLIC BLOOD PRESSURE: 78 MMHG | SYSTOLIC BLOOD PRESSURE: 138 MMHG | WEIGHT: 162 LBS | HEART RATE: 80 BPM | HEIGHT: 67 IN

## 2025-02-12 DIAGNOSIS — E78.2 MIXED HYPERLIPIDEMIA: ICD-10-CM

## 2025-02-12 DIAGNOSIS — I25.10 CORONARY ARTERY DISEASE INVOLVING NATIVE CORONARY ARTERY OF NATIVE HEART WITHOUT ANGINA PECTORIS: Primary | ICD-10-CM

## 2025-02-12 DIAGNOSIS — I48.0 PAROXYSMAL ATRIAL FIBRILLATION (HCC): ICD-10-CM

## 2025-02-12 DIAGNOSIS — N18.31 STAGE 3A CHRONIC KIDNEY DISEASE (HCC): ICD-10-CM

## 2025-02-12 DIAGNOSIS — I50.32 DIASTOLIC CHF, CHRONIC (HCC): ICD-10-CM

## 2025-02-12 PROCEDURE — 1090F PRES/ABSN URINE INCON ASSESS: CPT | Performed by: INTERNAL MEDICINE

## 2025-02-12 PROCEDURE — 99214 OFFICE O/P EST MOD 30 MIN: CPT | Performed by: INTERNAL MEDICINE

## 2025-02-12 PROCEDURE — 1036F TOBACCO NON-USER: CPT | Performed by: INTERNAL MEDICINE

## 2025-02-12 PROCEDURE — 1123F ACP DISCUSS/DSCN MKR DOCD: CPT | Performed by: INTERNAL MEDICINE

## 2025-02-12 PROCEDURE — G8417 CALC BMI ABV UP PARAM F/U: HCPCS | Performed by: INTERNAL MEDICINE

## 2025-02-12 PROCEDURE — G8399 PT W/DXA RESULTS DOCUMENT: HCPCS | Performed by: INTERNAL MEDICINE

## 2025-02-12 PROCEDURE — 1126F AMNT PAIN NOTED NONE PRSNT: CPT | Performed by: INTERNAL MEDICINE

## 2025-02-12 PROCEDURE — G8428 CUR MEDS NOT DOCUMENT: HCPCS | Performed by: INTERNAL MEDICINE

## 2025-02-12 NOTE — PROGRESS NOTES
2 [facility], [address] [place] [address]  PHONE: [phone]     25    NAME:  Ann Oneal  : 1940  MRN: [id number]       SUBJECTIVE:   Ann Oneal is a 84 y.o. female seen for a follow up visit regarding the following:     Chief Complaint   Patient presents with    Congestive Heart Failure       HPI: Here today for follow up for CAD   PCI to Diag, echo showed normal EF  Carotid US:  3/16 and 2018: mild carotid Dz  [facility] 11/10/17: mild CAD, EF 60%.  [facility] 2022:  mild CAD, EF normal, EDP 36  NST 2024: The study is negative for myocardial ischemia. Findings suggest a low risk of cardiac events.   Echo 2024: EF 50%, mild MR, LA severe        Asx in AF, no new LOAIZA< feeling better now.  No new angina. LOAIZA better on new meds.   Doing well on anticoagulation treatment as reviewed today, no bleeding issues or excessive bruising noted.      Patient denies recent history of orthopnea, PND, excessive dizziness and/or syncope.     She has GLEN, cannot wear mask.     Did not tolerate lipitor, crestor.         Past Medical History, Past Surgical History, Family history, Social History, and Medications were all reviewed with the patient today and updated as necessary.     Current Outpatient Medications   Medication Sig Dispense Refill    simvastatin (ZOCOR) 20 MG tablet Take 1 tablet by mouth nightly 90 tablet 1    fluticasone (FLONASE) 50 MCG/ACT nasal spray 2 sprays by Each Nostril route daily 16 g 3    albuterol sulfate HFA (PROVENTIL;VENTOLIN;PROAIR) 108 (90 Base) MCG/ACT inhaler Inhale 1 puff into the lungs every 6 hours as needed for Shortness of Breath 18 g 3    donepezil (ARICEPT) 5 MG tablet Take 1 tablet by mouth nightly 30 tablet 2    metFORMIN (GLUCOPHAGE) 500 MG tablet Take 1 tablet by mouth 2 times daily (with meals) 180 tablet 1    metoprolol tartrate (LOPRESSOR) 25 MG tablet Take 1 tablet by mouth 2 times daily 180 tablet 3    tiZANidine (ZANAFLEX) 2 MG tablet Take

## 2025-05-09 DIAGNOSIS — E11.21 TYPE 2 DIABETES WITH NEPHROPATHY (HCC): ICD-10-CM

## 2025-05-09 DIAGNOSIS — E78.2 MIXED HYPERLIPIDEMIA: ICD-10-CM

## 2025-05-09 RX ORDER — SIMVASTATIN 20 MG
20 TABLET ORAL NIGHTLY
Qty: 90 TABLET | Refills: 1 | OUTPATIENT
Start: 2025-05-09

## 2025-05-10 DIAGNOSIS — E11.21 TYPE 2 DIABETES WITH NEPHROPATHY (HCC): ICD-10-CM

## 2025-05-10 DIAGNOSIS — E78.2 MIXED HYPERLIPIDEMIA: ICD-10-CM

## 2025-05-12 RX ORDER — SIMVASTATIN 20 MG
20 TABLET ORAL NIGHTLY
Qty: 90 TABLET | Refills: 1 | OUTPATIENT
Start: 2025-05-12

## 2025-06-06 ENCOUNTER — OFFICE VISIT (OUTPATIENT)
Age: 85
End: 2025-06-06
Payer: MEDICARE

## 2025-06-06 VITALS
SYSTOLIC BLOOD PRESSURE: 128 MMHG | HEART RATE: 66 BPM | BODY MASS INDEX: 25.77 KG/M2 | WEIGHT: 164.2 LBS | DIASTOLIC BLOOD PRESSURE: 80 MMHG | HEIGHT: 67 IN

## 2025-06-06 DIAGNOSIS — I50.32 DIASTOLIC CHF, CHRONIC (HCC): Primary | ICD-10-CM

## 2025-06-06 DIAGNOSIS — I50.32 DIASTOLIC CHF, CHRONIC (HCC): ICD-10-CM

## 2025-06-06 DIAGNOSIS — I48.0 PAROXYSMAL ATRIAL FIBRILLATION (HCC): ICD-10-CM

## 2025-06-06 DIAGNOSIS — E78.2 MIXED HYPERLIPIDEMIA: ICD-10-CM

## 2025-06-06 DIAGNOSIS — I25.10 CORONARY ARTERY DISEASE INVOLVING NATIVE CORONARY ARTERY OF NATIVE HEART WITHOUT ANGINA PECTORIS: ICD-10-CM

## 2025-06-06 LAB
ALBUMIN SERPL-MCNC: 3.9 G/DL (ref 3.2–4.6)
ALBUMIN/GLOB SERPL: 1.1 (ref 1–1.9)
ALP SERPL-CCNC: 87 U/L (ref 35–104)
ALT SERPL-CCNC: 19 U/L (ref 8–45)
ANION GAP SERPL CALC-SCNC: 11 MMOL/L (ref 7–16)
AST SERPL-CCNC: 28 U/L (ref 15–37)
BILIRUB SERPL-MCNC: 0.5 MG/DL (ref 0–1.2)
BUN SERPL-MCNC: 22 MG/DL (ref 8–23)
CALCIUM SERPL-MCNC: 9.9 MG/DL (ref 8.8–10.2)
CHLORIDE SERPL-SCNC: 100 MMOL/L (ref 98–107)
CO2 SERPL-SCNC: 27 MMOL/L (ref 20–29)
CREAT SERPL-MCNC: 1.06 MG/DL (ref 0.6–1.1)
ERYTHROCYTE [DISTWIDTH] IN BLOOD BY AUTOMATED COUNT: 12.7 % (ref 11.9–14.6)
GLOBULIN SER CALC-MCNC: 3.4 G/DL (ref 2.3–3.5)
GLUCOSE SERPL-MCNC: 105 MG/DL (ref 70–99)
HCT VFR BLD AUTO: 40.8 % (ref 35.8–46.3)
HGB BLD-MCNC: 13 G/DL (ref 11.7–15.4)
MAGNESIUM SERPL-MCNC: 2.3 MG/DL (ref 1.8–2.4)
MCH RBC QN AUTO: 31.9 PG (ref 26.1–32.9)
MCHC RBC AUTO-ENTMCNC: 31.9 G/DL (ref 31.4–35)
MCV RBC AUTO: 100 FL (ref 82–102)
NRBC # BLD: 0 K/UL (ref 0–0.2)
PLATELET # BLD AUTO: 148 K/UL (ref 150–450)
PMV BLD AUTO: 12.7 FL (ref 9.4–12.3)
POTASSIUM SERPL-SCNC: 5.4 MMOL/L (ref 3.5–5.1)
PROT SERPL-MCNC: 7.3 G/DL (ref 6.3–8.2)
RBC # BLD AUTO: 4.08 M/UL (ref 4.05–5.2)
SODIUM SERPL-SCNC: 138 MMOL/L (ref 136–145)
TSH, 3RD GENERATION: 2.17 UIU/ML (ref 0.27–4.2)
WBC # BLD AUTO: 9.1 K/UL (ref 4.3–11.1)

## 2025-06-06 PROCEDURE — G8417 CALC BMI ABV UP PARAM F/U: HCPCS | Performed by: INTERNAL MEDICINE

## 2025-06-06 PROCEDURE — 99214 OFFICE O/P EST MOD 30 MIN: CPT | Performed by: INTERNAL MEDICINE

## 2025-06-06 PROCEDURE — 1126F AMNT PAIN NOTED NONE PRSNT: CPT | Performed by: INTERNAL MEDICINE

## 2025-06-06 PROCEDURE — 1123F ACP DISCUSS/DSCN MKR DOCD: CPT | Performed by: INTERNAL MEDICINE

## 2025-06-06 PROCEDURE — G8399 PT W/DXA RESULTS DOCUMENT: HCPCS | Performed by: INTERNAL MEDICINE

## 2025-06-06 PROCEDURE — 1036F TOBACCO NON-USER: CPT | Performed by: INTERNAL MEDICINE

## 2025-06-06 PROCEDURE — G8428 CUR MEDS NOT DOCUMENT: HCPCS | Performed by: INTERNAL MEDICINE

## 2025-06-06 PROCEDURE — 1090F PRES/ABSN URINE INCON ASSESS: CPT | Performed by: INTERNAL MEDICINE

## 2025-06-06 RX ORDER — GINGER ROOT 550 MG
CAPSULE ORAL
COMMUNITY

## 2025-06-06 NOTE — PROGRESS NOTES
Disease Brother     Cancer Sister         BREAST    Hypertension Other     Other Other         No family history of pulmonary concerns    Deep Vein Thrombosis Mother     Coronary Art Dis Father 50     Social History     Tobacco Use    Smoking status: Never     Passive exposure: Past    Smokeless tobacco: Never    Tobacco comments:     Quit smoking: Secondhand smoke exposure for 16 years from her  cigarettes   Substance Use Topics    Alcohol use: Never         ROS:    No obvious pertinent positives on review of systems except for what was outlined in the HPI today.      PHYSICAL EXAM:     /80   Pulse 66   Ht 1.702 m (5' 7\")   Wt 74.5 kg (164 lb 3.2 oz)   BMI 25.72 kg/m²    General/Constitutional:   Alert and oriented x 3, no acute distress  HEENT:   normocephalic, atraumatic, moist mucous membranes  Neck:   No JVD or carotid bruits bilaterally  Cardiovascular:   regular rate and rhythm, no murmur/rub/gallop appreciated  Pulmonary:   clear to auscultation bilaterally, no respiratory distress  Abdomen:   soft, non-tender, non-distended  Ext:   No sig LE edema bilaterally  Skin:  warm and dry, no obvious rashes seen  Neuro:   no obvious sensory or motor deficits  Psychiatric:   normal mood and affect      Lab Results   Component Value Date/Time     08/13/2024 10:55 AM    K 4.7 08/13/2024 10:55 AM     08/13/2024 10:55 AM    CO2 27 08/13/2024 10:55 AM    BUN 22 08/13/2024 10:55 AM    CREATININE 1.02 08/13/2024 10:55 AM    GLUCOSE 94 08/13/2024 10:55 AM    CALCIUM 9.7 08/13/2024 10:55 AM        Lab Results   Component Value Date    WBC 6.2 08/13/2024    HGB 12.3 08/13/2024    HCT 39.8 08/13/2024    MCV 99.7 08/13/2024     08/13/2024       Lab Results   Component Value Date    TSH 2.210 01/26/2022       Lab Results   Component Value Date    LABA1C 6.2 (H) 01/30/2024     Lab Results   Component Value Date     01/30/2024       Lab Results   Component Value Date    CHOL 164 01/30/2024

## 2025-06-08 ENCOUNTER — RESULTS FOLLOW-UP (OUTPATIENT)
Dept: CARDIOLOGY CLINIC | Age: 85
End: 2025-06-08

## 2025-06-09 NOTE — TELEPHONE ENCOUNTER
----- Message from Dr. Vince Roper, DO sent at 6/8/2025  7:54 PM EDT -----  Please call her, labs are ok.  Normal Cr and liver function.   Normal Hb.  K 5.4, avoid K rich foods.    Thanks

## 2025-06-10 DIAGNOSIS — E11.21 TYPE 2 DIABETES WITH NEPHROPATHY (HCC): ICD-10-CM

## 2025-06-10 DIAGNOSIS — E78.2 MIXED HYPERLIPIDEMIA: ICD-10-CM

## 2025-06-11 ENCOUNTER — TELEPHONE (OUTPATIENT)
Age: 85
End: 2025-06-11

## 2025-06-11 RX ORDER — SIMVASTATIN 20 MG
20 TABLET ORAL NIGHTLY
Qty: 90 TABLET | Refills: 1 | OUTPATIENT
Start: 2025-06-11

## 2025-06-11 RX ORDER — METOPROLOL TARTRATE 25 MG/1
25 TABLET, FILM COATED ORAL 2 TIMES DAILY
Qty: 180 TABLET | Refills: 3 | Status: SHIPPED | OUTPATIENT
Start: 2025-06-11

## 2025-06-11 NOTE — TELEPHONE ENCOUNTER
Requested Prescriptions     Pending Prescriptions Disp Refills    metoprolol tartrate (LOPRESSOR) 25 MG tablet [Pharmacy Med Name: METOPROLOL TARTRATE 25 MG TAB] 180 tablet 3     Sig: TAKE ONE TABLET BY MOUTH TWICE A DAY         Last ov 6/6/25 reviewed and medication verified.

## 2025-06-11 NOTE — TELEPHONE ENCOUNTER
Patient asked that we contact her daughter, SULTANA rodriguez.    Patient is concerned about the cost of Eliquis.     Spoke to patients daughter, Bonnie, gave her information regarding [place] [facility]. Samples left at  for patient

## 2025-06-13 RX ORDER — TIZANIDINE 2 MG/1
2 TABLET ORAL NIGHTLY PRN
Qty: 30 TABLET | Refills: 2 | Status: SHIPPED | OUTPATIENT
Start: 2025-06-13

## 2025-07-02 ENCOUNTER — TELEPHONE (OUTPATIENT)
Dept: FAMILY MEDICINE CLINIC | Facility: CLINIC | Age: 85
End: 2025-07-02

## 2025-07-02 DIAGNOSIS — E78.2 MIXED HYPERLIPIDEMIA: ICD-10-CM

## 2025-07-02 DIAGNOSIS — E11.21 TYPE 2 DIABETES WITH NEPHROPATHY (HCC): ICD-10-CM

## 2025-07-02 RX ORDER — SIMVASTATIN 20 MG
20 TABLET ORAL NIGHTLY
Qty: 90 TABLET | Refills: 1 | Status: SHIPPED | OUTPATIENT
Start: 2025-07-02

## 2025-07-02 NOTE — TELEPHONE ENCOUNTER
Patient has  appointment scheduled for 8/5 but is currently out of :       metFORMIN (GLUCOPHAGE) 500 MG tablet  simvastatin (ZOCOR) 20 MG tablet     Had appointment on 5/1 went to wrong office and was sent home, please help!

## 2025-08-04 SDOH — ECONOMIC STABILITY: FOOD INSECURITY: WITHIN THE PAST 12 MONTHS, THE FOOD YOU BOUGHT JUST DIDN'T LAST AND YOU DIDN'T HAVE MONEY TO GET MORE.: NEVER TRUE

## 2025-08-04 SDOH — ECONOMIC STABILITY: INCOME INSECURITY: IN THE LAST 12 MONTHS, WAS THERE A TIME WHEN YOU WERE NOT ABLE TO PAY THE MORTGAGE OR RENT ON TIME?: NO

## 2025-08-04 SDOH — ECONOMIC STABILITY: FOOD INSECURITY: WITHIN THE PAST 12 MONTHS, YOU WORRIED THAT YOUR FOOD WOULD RUN OUT BEFORE YOU GOT MONEY TO BUY MORE.: NEVER TRUE

## 2025-08-04 ASSESSMENT — PATIENT HEALTH QUESTIONNAIRE - PHQ9
6. FEELING BAD ABOUT YOURSELF - OR THAT YOU ARE A FAILURE OR HAVE LET YOURSELF OR YOUR FAMILY DOWN: NOT AT ALL
SUM OF ALL RESPONSES TO PHQ QUESTIONS 1-9: 0
7. TROUBLE CONCENTRATING ON THINGS, SUCH AS READING THE NEWSPAPER OR WATCHING TELEVISION: NOT AT ALL
SUM OF ALL RESPONSES TO PHQ QUESTIONS 1-9: 0
SUM OF ALL RESPONSES TO PHQ QUESTIONS 1-9: 0
5. POOR APPETITE OR OVEREATING: NOT AT ALL
9. THOUGHTS THAT YOU WOULD BE BETTER OFF DEAD, OR OF HURTING YOURSELF: NOT AT ALL
10. IF YOU CHECKED OFF ANY PROBLEMS, HOW DIFFICULT HAVE THESE PROBLEMS MADE IT FOR YOU TO DO YOUR WORK, TAKE CARE OF THINGS AT HOME, OR GET ALONG WITH OTHER PEOPLE: NOT DIFFICULT AT ALL
8. MOVING OR SPEAKING SO SLOWLY THAT OTHER PEOPLE COULD HAVE NOTICED. OR THE OPPOSITE - BEING SO FIDGETY OR RESTLESS THAT YOU HAVE BEEN MOVING AROUND A LOT MORE THAN USUAL: NOT AT ALL
1. LITTLE INTEREST OR PLEASURE IN DOING THINGS: NOT AT ALL
3. TROUBLE FALLING OR STAYING ASLEEP: NOT AT ALL
8. MOVING OR SPEAKING SO SLOWLY THAT OTHER PEOPLE COULD HAVE NOTICED. OR THE OPPOSITE, BEING SO FIGETY OR RESTLESS THAT YOU HAVE BEEN MOVING AROUND A LOT MORE THAN USUAL: NOT AT ALL
2. FEELING DOWN, DEPRESSED OR HOPELESS: NOT AT ALL
6. FEELING BAD ABOUT YOURSELF - OR THAT YOU ARE A FAILURE OR HAVE LET YOURSELF OR YOUR FAMILY DOWN: NOT AT ALL
SUM OF ALL RESPONSES TO PHQ QUESTIONS 1-9: 0
2. FEELING DOWN, DEPRESSED OR HOPELESS: NOT AT ALL
3. TROUBLE FALLING OR STAYING ASLEEP: NOT AT ALL
10. IF YOU CHECKED OFF ANY PROBLEMS, HOW DIFFICULT HAVE THESE PROBLEMS MADE IT FOR YOU TO DO YOUR WORK, TAKE CARE OF THINGS AT HOME, OR GET ALONG WITH OTHER PEOPLE: NOT DIFFICULT AT ALL
7. TROUBLE CONCENTRATING ON THINGS, SUCH AS READING THE NEWSPAPER OR WATCHING TELEVISION: NOT AT ALL
5. POOR APPETITE OR OVEREATING: NOT AT ALL
SUM OF ALL RESPONSES TO PHQ QUESTIONS 1-9: 0
4. FEELING TIRED OR HAVING LITTLE ENERGY: NOT AT ALL
9. THOUGHTS THAT YOU WOULD BE BETTER OFF DEAD, OR OF HURTING YOURSELF: NOT AT ALL
1. LITTLE INTEREST OR PLEASURE IN DOING THINGS: NOT AT ALL
4. FEELING TIRED OR HAVING LITTLE ENERGY: NOT AT ALL

## 2025-08-05 ENCOUNTER — OFFICE VISIT (OUTPATIENT)
Dept: FAMILY MEDICINE CLINIC | Facility: CLINIC | Age: 85
End: 2025-08-05
Payer: MEDICARE

## 2025-08-05 VITALS
SYSTOLIC BLOOD PRESSURE: 98 MMHG | BODY MASS INDEX: 25.49 KG/M2 | TEMPERATURE: 97.5 F | OXYGEN SATURATION: 96 % | HEIGHT: 67 IN | WEIGHT: 162.4 LBS | HEART RATE: 74 BPM | DIASTOLIC BLOOD PRESSURE: 64 MMHG

## 2025-08-05 DIAGNOSIS — E78.1 HYPERTRIGLYCERIDEMIA: ICD-10-CM

## 2025-08-05 DIAGNOSIS — F33.1 MAJOR DEPRESSIVE DISORDER, RECURRENT, MODERATE (HCC): ICD-10-CM

## 2025-08-05 DIAGNOSIS — F01.50 VASCULAR DEMENTIA, UNSPECIFIED DEMENTIA SEVERITY, UNSPECIFIED WHETHER BEHAVIORAL, PSYCHOTIC, OR MOOD DISTURBANCE OR ANXIETY (HCC): ICD-10-CM

## 2025-08-05 DIAGNOSIS — R73.09 ELEVATED GLUCOSE: ICD-10-CM

## 2025-08-05 DIAGNOSIS — I10 PRIMARY HYPERTENSION: ICD-10-CM

## 2025-08-05 DIAGNOSIS — E87.5 HYPERKALEMIA: ICD-10-CM

## 2025-08-05 DIAGNOSIS — E11.21 TYPE 2 DIABETES WITH NEPHROPATHY (HCC): Primary | ICD-10-CM

## 2025-08-05 DIAGNOSIS — Z00.00 MEDICARE ANNUAL WELLNESS VISIT, SUBSEQUENT: ICD-10-CM

## 2025-08-05 DIAGNOSIS — Z01.00 ENCOUNTER FOR COMPLETE EYE EXAM: ICD-10-CM

## 2025-08-05 DIAGNOSIS — J45.20 MILD INTERMITTENT ASTHMA WITHOUT COMPLICATION: ICD-10-CM

## 2025-08-05 LAB
ALBUMIN SERPL-MCNC: 3.8 G/DL (ref 3.2–4.6)
ALBUMIN/GLOB SERPL: 1.2 (ref 1–1.9)
ALP SERPL-CCNC: 73 U/L (ref 35–104)
ALT SERPL-CCNC: 15 U/L (ref 8–45)
ANION GAP SERPL CALC-SCNC: 10 MMOL/L (ref 7–16)
AST SERPL-CCNC: 20 U/L (ref 15–37)
BILIRUB SERPL-MCNC: 0.4 MG/DL (ref 0–1.2)
BUN SERPL-MCNC: 23 MG/DL (ref 8–23)
CALCIUM SERPL-MCNC: 9.7 MG/DL (ref 8.8–10.2)
CHLORIDE SERPL-SCNC: 103 MMOL/L (ref 98–107)
CHOLEST SERPL-MCNC: 138 MG/DL (ref 0–200)
CO2 SERPL-SCNC: 27 MMOL/L (ref 20–29)
CREAT SERPL-MCNC: 1.02 MG/DL (ref 0.6–1.1)
GLOBULIN SER CALC-MCNC: 3 G/DL (ref 2.3–3.5)
GLUCOSE SERPL-MCNC: 100 MG/DL (ref 70–99)
HDLC SERPL-MCNC: 72 MG/DL (ref 40–60)
HDLC SERPL: 1.9 (ref 0–5)
LDLC SERPL CALC-MCNC: 48 MG/DL (ref 0–100)
POTASSIUM SERPL-SCNC: 5 MMOL/L (ref 3.5–5.1)
PROT SERPL-MCNC: 6.8 G/DL (ref 6.3–8.2)
SODIUM SERPL-SCNC: 140 MMOL/L (ref 136–145)
TRIGL SERPL-MCNC: 93 MG/DL (ref 0–150)
VLDLC SERPL CALC-MCNC: 19 MG/DL (ref 6–23)

## 2025-08-05 PROCEDURE — 99214 OFFICE O/P EST MOD 30 MIN: CPT | Performed by: NURSE PRACTITIONER

## 2025-08-05 PROCEDURE — 1090F PRES/ABSN URINE INCON ASSESS: CPT | Performed by: NURSE PRACTITIONER

## 2025-08-05 PROCEDURE — 3078F DIAST BP <80 MM HG: CPT | Performed by: NURSE PRACTITIONER

## 2025-08-05 PROCEDURE — G8399 PT W/DXA RESULTS DOCUMENT: HCPCS | Performed by: NURSE PRACTITIONER

## 2025-08-05 PROCEDURE — G8417 CALC BMI ABV UP PARAM F/U: HCPCS | Performed by: NURSE PRACTITIONER

## 2025-08-05 PROCEDURE — 83036 HEMOGLOBIN GLYCOSYLATED A1C: CPT | Performed by: NURSE PRACTITIONER

## 2025-08-05 PROCEDURE — G0439 PPPS, SUBSEQ VISIT: HCPCS | Performed by: NURSE PRACTITIONER

## 2025-08-05 PROCEDURE — 1123F ACP DISCUSS/DSCN MKR DOCD: CPT | Performed by: NURSE PRACTITIONER

## 2025-08-05 PROCEDURE — 1160F RVW MEDS BY RX/DR IN RCRD: CPT | Performed by: NURSE PRACTITIONER

## 2025-08-05 PROCEDURE — 1036F TOBACCO NON-USER: CPT | Performed by: NURSE PRACTITIONER

## 2025-08-05 PROCEDURE — 3074F SYST BP LT 130 MM HG: CPT | Performed by: NURSE PRACTITIONER

## 2025-08-05 PROCEDURE — G8427 DOCREV CUR MEDS BY ELIG CLIN: HCPCS | Performed by: NURSE PRACTITIONER

## 2025-08-05 PROCEDURE — 1159F MED LIST DOCD IN RCRD: CPT | Performed by: NURSE PRACTITIONER

## 2025-08-05 RX ORDER — LOSARTAN POTASSIUM 25 MG/1
25 TABLET ORAL DAILY
Qty: 90 TABLET | Refills: 1 | Status: SHIPPED | OUTPATIENT
Start: 2025-08-05

## 2025-08-05 RX ORDER — ALBUTEROL SULFATE 90 UG/1
1 INHALANT RESPIRATORY (INHALATION) EVERY 6 HOURS PRN
Qty: 18 G | Refills: 3 | Status: SHIPPED | OUTPATIENT
Start: 2025-08-05

## 2025-08-05 SDOH — ECONOMIC STABILITY: FOOD INSECURITY: WITHIN THE PAST 12 MONTHS, THE FOOD YOU BOUGHT JUST DIDN'T LAST AND YOU DIDN'T HAVE MONEY TO GET MORE.: NEVER TRUE

## 2025-08-05 SDOH — ECONOMIC STABILITY: FOOD INSECURITY: WITHIN THE PAST 12 MONTHS, YOU WORRIED THAT YOUR FOOD WOULD RUN OUT BEFORE YOU GOT MONEY TO BUY MORE.: NEVER TRUE

## 2025-08-05 ASSESSMENT — ENCOUNTER SYMPTOMS
ALLERGIC/IMMUNOLOGIC NEGATIVE: 1
EYES NEGATIVE: 1
RESPIRATORY NEGATIVE: 1
GASTROINTESTINAL NEGATIVE: 1

## 2025-08-06 ENCOUNTER — CLINICAL SUPPORT (OUTPATIENT)
Dept: FAMILY MEDICINE CLINIC | Facility: CLINIC | Age: 85
End: 2025-08-06

## 2025-08-06 LAB — HBA1C MFR BLD: 6.1 %

## (undated) DEVICE — GUIDEWIRE VASC L260CM DIA0.035IN RAD 3MM J TIP L7CM PTFE

## (undated) DEVICE — TROCAR: Brand: KII FIOS FIRST ENTRY

## (undated) DEVICE — PAD PT POS 36 IN SURGYPAD DISP

## (undated) DEVICE — BAND COMPR L24CM REG CLR PLAS HEMSTAT EXT HK AND LOOP RETEN

## (undated) DEVICE — STRIP,CLOSURE,WOUND,MEDI-STRIP,1/2X4: Brand: MEDLINE

## (undated) DEVICE — GLOVE SURG SZ 7 L12IN FNGR THK79MIL GRN LTX FREE

## (undated) DEVICE — SUTURE MCRYL SZ 3-0 L27IN ABSRB UD L19MM PS-2 3/8 CIR PRIM Y427H

## (undated) DEVICE — GLIDESHEATH SLENDER STAINLESS STEEL KIT: Brand: GLIDESHEATH SLENDER

## (undated) DEVICE — TOTAL TRAY, DB, 100% SILI FOLEY, 16FR 10: Brand: MEDLINE

## (undated) DEVICE — RADIFOCUS OPTITORQUE ANGIOGRAPHIC CATHETER: Brand: OPTITORQUE

## (undated) DEVICE — [HIGH FLOW INSUFFLATOR,  DO NOT USE IF PACKAGE IS DAMAGED,  KEEP DRY,  KEEP AWAY FROM SUNLIGHT,  PROTECT FROM HEAT AND RADIOACTIVE SOURCES.]: Brand: PNEUMOSURE

## (undated) DEVICE — COVER,TABLE,44X76,STERILE: Brand: MEDLINE

## (undated) DEVICE — SUTURE V-LOC 180 SZ 3-0 L9IN ABSRB GRN L26MM V-20 1/2 CIR VLOCL0644

## (undated) DEVICE — GENERAL LAPAROSCOPY: Brand: MEDLINE INDUSTRIES, INC.

## (undated) DEVICE — ELECTRO LUBE IS A SINGLE PATIENT USE DEVICE THAT IS INTENDED TO BE USED ON ELECTROSURGICAL ELECTRODES TO REDUCE STICKING.: Brand: KEY SURGICAL ELECTRO LUBE

## (undated) DEVICE — GLOVE ORANGE PI 7   MSG9070

## (undated) DEVICE — TIP COVER ACCESSORY

## (undated) DEVICE — CATHETER DIAG AD 5FR L110CM 145DEG COR GRY HYDRPHLC NYL

## (undated) DEVICE — BLADELESS OBTURATOR: Brand: WECK VISTA

## (undated) DEVICE — MASTISOL ADHESIVE LIQ 2/3ML

## (undated) DEVICE — SOLUTION ANTIFOG VIS SYS CLEARIFY LAPSCP

## (undated) DEVICE — COLUMN DRAPE

## (undated) DEVICE — ARM DRAPE

## (undated) DEVICE — SUTURE VCRL SZ 2-0 L27IN ABSRB UD L26MM SH 1/2 CIR J417H